# Patient Record
Sex: MALE | Race: WHITE | NOT HISPANIC OR LATINO | Employment: FULL TIME | URBAN - METROPOLITAN AREA
[De-identification: names, ages, dates, MRNs, and addresses within clinical notes are randomized per-mention and may not be internally consistent; named-entity substitution may affect disease eponyms.]

---

## 2017-02-25 ENCOUNTER — APPOINTMENT (OUTPATIENT)
Dept: ULTRASOUND IMAGING | Facility: CLINIC | Age: 42
End: 2017-02-25
Attending: EMERGENCY MEDICINE
Payer: COMMERCIAL

## 2017-02-25 ENCOUNTER — HOSPITAL ENCOUNTER (EMERGENCY)
Facility: CLINIC | Age: 42
Discharge: HOME | End: 2017-02-25
Attending: EMERGENCY MEDICINE | Admitting: EMERGENCY MEDICINE
Payer: COMMERCIAL

## 2017-02-25 VITALS
OXYGEN SATURATION: 98 % | DIASTOLIC BLOOD PRESSURE: 85 MMHG | HEIGHT: 71 IN | TEMPERATURE: 98.7 F | SYSTOLIC BLOOD PRESSURE: 140 MMHG | HEART RATE: 66 BPM | BODY MASS INDEX: 28 KG/M2 | RESPIRATION RATE: 16 BRPM | WEIGHT: 200 LBS

## 2017-02-25 DIAGNOSIS — R10.13 ABDOMINAL PAIN, EPIGASTRIC: ICD-10-CM

## 2017-02-25 DIAGNOSIS — R11.2 NAUSEA AND VOMITING, INTRACTABILITY OF VOMITING NOT SPECIFIED, UNSPECIFIED VOMITING TYPE: ICD-10-CM

## 2017-02-25 LAB
ALBUMIN SERPL-MCNC: 3.1 G/DL (ref 3.4–5)
ALP SERPL-CCNC: 54 U/L (ref 40–150)
ALT SERPL W P-5'-P-CCNC: 15 U/L (ref 0–70)
ANION GAP SERPL CALCULATED.3IONS-SCNC: 10 MMOL/L (ref 3–14)
AST SERPL W P-5'-P-CCNC: 11 U/L (ref 0–45)
BASOPHILS # BLD AUTO: 0 10E9/L (ref 0–0.2)
BASOPHILS NFR BLD AUTO: 0.2 %
BILIRUB SERPL-MCNC: 0.7 MG/DL (ref 0.2–1.3)
BUN SERPL-MCNC: 11 MG/DL (ref 7–30)
CALCIUM SERPL-MCNC: 8.3 MG/DL (ref 8.5–10.1)
CHLORIDE SERPL-SCNC: 95 MMOL/L (ref 94–109)
CO2 SERPL-SCNC: 27 MMOL/L (ref 20–32)
CREAT SERPL-MCNC: 0.78 MG/DL (ref 0.66–1.25)
DIFFERENTIAL METHOD BLD: NORMAL
EOSINOPHIL # BLD AUTO: 0 10E9/L (ref 0–0.7)
EOSINOPHIL NFR BLD AUTO: 0.5 %
ERYTHROCYTE [DISTWIDTH] IN BLOOD BY AUTOMATED COUNT: 12.8 % (ref 10–15)
GFR SERPL CREATININE-BSD FRML MDRD: ABNORMAL ML/MIN/1.7M2
GLUCOSE SERPL-MCNC: 106 MG/DL (ref 70–99)
HCT VFR BLD AUTO: 46.3 % (ref 40–53)
HGB BLD-MCNC: 16.8 G/DL (ref 13.3–17.7)
IMM GRANULOCYTES # BLD: 0 10E9/L (ref 0–0.4)
IMM GRANULOCYTES NFR BLD: 0.3 %
LIPASE SERPL-CCNC: 115 U/L (ref 73–393)
LYMPHOCYTES # BLD AUTO: 1.2 10E9/L (ref 0.8–5.3)
LYMPHOCYTES NFR BLD AUTO: 13.1 %
MCH RBC QN AUTO: 31.8 PG (ref 26.5–33)
MCHC RBC AUTO-ENTMCNC: 36.3 G/DL (ref 31.5–36.5)
MCV RBC AUTO: 88 FL (ref 78–100)
MONOCYTES # BLD AUTO: 1.1 10E9/L (ref 0–1.3)
MONOCYTES NFR BLD AUTO: 12.1 %
NEUTROPHILS # BLD AUTO: 6.5 10E9/L (ref 1.6–8.3)
NEUTROPHILS NFR BLD AUTO: 73.8 %
NRBC # BLD AUTO: 0 10*3/UL
NRBC BLD AUTO-RTO: 0 /100
PLATELET # BLD AUTO: 290 10E9/L (ref 150–450)
POTASSIUM SERPL-SCNC: 3.4 MMOL/L (ref 3.4–5.3)
PROT SERPL-MCNC: 6.8 G/DL (ref 6.8–8.8)
RBC # BLD AUTO: 5.29 10E12/L (ref 4.4–5.9)
SODIUM SERPL-SCNC: 132 MMOL/L (ref 133–144)
WBC # BLD AUTO: 8.8 10E9/L (ref 4–11)

## 2017-02-25 PROCEDURE — 85025 COMPLETE CBC W/AUTO DIFF WBC: CPT | Performed by: EMERGENCY MEDICINE

## 2017-02-25 PROCEDURE — 83690 ASSAY OF LIPASE: CPT | Performed by: EMERGENCY MEDICINE

## 2017-02-25 PROCEDURE — 80053 COMPREHEN METABOLIC PANEL: CPT | Performed by: EMERGENCY MEDICINE

## 2017-02-25 PROCEDURE — 76705 ECHO EXAM OF ABDOMEN: CPT

## 2017-02-25 PROCEDURE — 25000128 H RX IP 250 OP 636: Performed by: EMERGENCY MEDICINE

## 2017-02-25 PROCEDURE — 96374 THER/PROPH/DIAG INJ IV PUSH: CPT

## 2017-02-25 PROCEDURE — 96361 HYDRATE IV INFUSION ADD-ON: CPT

## 2017-02-25 PROCEDURE — 99285 EMERGENCY DEPT VISIT HI MDM: CPT | Mod: 25

## 2017-02-25 RX ORDER — ONDANSETRON 2 MG/ML
4 INJECTION INTRAMUSCULAR; INTRAVENOUS
Status: COMPLETED | OUTPATIENT
Start: 2017-02-25 | End: 2017-02-25

## 2017-02-25 RX ORDER — ONDANSETRON 4 MG/1
4 TABLET, ORALLY DISINTEGRATING ORAL EVERY 8 HOURS PRN
Qty: 10 TABLET | Refills: 0 | Status: SHIPPED | OUTPATIENT
Start: 2017-02-25 | End: 2017-02-28

## 2017-02-25 RX ORDER — SODIUM CHLORIDE 9 MG/ML
1000 INJECTION, SOLUTION INTRAVENOUS CONTINUOUS
Status: DISCONTINUED | OUTPATIENT
Start: 2017-02-25 | End: 2017-02-25 | Stop reason: HOSPADM

## 2017-02-25 RX ADMIN — SODIUM CHLORIDE 1000 ML: 9 INJECTION, SOLUTION INTRAVENOUS at 06:54

## 2017-02-25 RX ADMIN — ONDANSETRON HYDROCHLORIDE 4 MG: 2 SOLUTION INTRAMUSCULAR; INTRAVENOUS at 06:04

## 2017-02-25 RX ADMIN — SODIUM CHLORIDE 1000 ML: 9 INJECTION, SOLUTION INTRAVENOUS at 06:04

## 2017-02-25 ASSESSMENT — ENCOUNTER SYMPTOMS
NAUSEA: 1
SORE THROAT: 1
DIARRHEA: 0
COUGH: 0
CHILLS: 1
ABDOMINAL PAIN: 1
SHORTNESS OF BREATH: 0
VOMITING: 1
FEVER: 1
MYALGIAS: 1

## 2017-02-25 NOTE — ED AVS SNAPSHOT
Emergency Department    [address] [facility] [address]    Phone:  [phone]    Fax:  [phone]                                       Simone Johnson   MRN: [id number]    Department:   Emergency Department   Date of Visit:  2/25/2017           Patient Information     Date Of Birth          1975        Your diagnoses for this visit were:     Abdominal pain, epigastric     Nausea and vomiting, intractability of vomiting not specified, unspecified vomiting type        You were seen by Garrett Oconnor MD.      Follow-up Information     Follow up with [facility]. Schedule an appointment as soon as possible for a visit in 2 days.    Specialties:  Podiatry, Internal Medicine, Family Medicine    Contact information:    [address] [address] [phone]        Follow up with  Emergency Department.    Specialty:  EMERGENCY MEDICINE    Why:  If symptoms worsen    Contact information:    [address] [facility] [id number] [phone]        Discharge Instructions       Follow-up:  Please follow-up with your primary care provider in 2-3 days for re-evaluation and discussion of your visit to the emergency department today.    Home treatments:  Recommended home therapies include rest, fluids as able, zofran for nausea, bland diet, and close monitoring of symptoms.    New prescriptions:  Zofran    Return precautions:  Warning signs which should prompt you to return to the ER include worsening nausea, vomiting, inability to keep down fluids or foods, severe pain, or any other new or troubling symptoms.  We are always happy to see you again.    Discharge Instructions  Abdominal Pain    Abdominal pain can be caused by many things. Your evaluation today does not show the exact cause for your pain. Your doctor today has decided that it is unlikely your pain is due to a life threatening problem, or a problem requiring  surgery or hospital admission. Sometimes those problems cannot be found right away, so it is very important that you follow up as directed.  Sometimes only the changes which occur over time allow the cause of your pain to be found.    Return to the Emergency Department for a recheck in 8-12 hours if your pain continues.  If your pain gets worse, changes in location, or feels different, return to the Emergency Department right away.    ADULTS:  Return to the Emergency Department right away if:      You get an oral temperature above 102oF or as directed by your doctor.    You have blood in your stools (bright red or black, tarry stools).    You keep throwing up or can t drink liquids.    You see blood when you throw up.    You can t have a bowel movement or you can t pass gas.    Your stomach gets bloated or bigger.    Your skin or the whites of your eyes look yellow.    You faint.    You have bloody, frequent or painful urination.    You have new symptoms or anything that worries you.    CHILDREN:  Return to the Emergency Department right away if your child has any of the above-listed symptoms or the following:      Pushes your hand away or screams/cries when his/her belly is touched.    You notice your child is very fussy or weak.    Your child is very tired and is too tired to eat or drink.    Your child is dehydrated.  Signs of dehydration can be:  o Your infant has had no wet diapers in 4-5 hours.  o Your older child has not passed urine in 6-8 hours.  o Your infant or child starts to have dry mouth and lips, or no saliva or tears.    PREGNANT WOMEN:  Return to the Emergency Department right away if you have any of the above-listed symptoms or the following:      You have bleeding, leaking fluid or passing tissue from the vagina.    You have worse pain or cramping, or pain in your shoulder or back.    You have vomiting that will not stop.    You have painful or bloody urination.    You have a temperature of 100oF  or more.    Your baby is not moving as much as usual.    You faint.    You get a bad headache with or without eye problems and abdominal pain.    You have a convulsion or seizure.    You have unusual discharge from your vagina and abdominal pain.    Abdominal pain is pretty common during pregnancy.  Your pain may or may not be related to your pregnancy. You should follow-up closely with your OB doctor so they can evaluate you and your baby.  Until you follow-up with your regular doctor, do the following:       Avoid sex and do not put anything in your vagina.    Drink clear fluids.    Only take medications approved by your doctor.    MORE INFORMATION:    Appendicitis:  A possible cause of abdominal pain in any person who still has their appendix is acute appendicitis. Appendicitis is often hard to diagnose.  Testing does not always rule out early appendicitis or other causes of abdominal pain. Close follow-up with your doctor and re-evaluations may be needed to figure out the reason for your abdominal pain.    Follow-up:  It is very important that you make an appointment with your clinic and go to the appointment.  If you do not follow-up with your primary doctor, it may result in missing an important development which could result in permanent injury or disability and/or lasting pain.  If there is any problem keeping your appointment, call your doctor or return to the Emergency Department.    Medications:  Take your medications as directed by your doctor today.  Before using over-the-counter medications, ask your doctor and make sure to take the medications as directed.  If you have any questions about medications, ask your doctor.    Diet:  Resume your normal diet as much as possible, but do not eat fried, fatty or spicy foods while you have pain.  Do not drink alcohol or have caffeine.  Do not smoke tobacco.    Probiotics: If you have been given an antibiotic, you may want to also take a probiotic pill or eat  "yogurt with live cultures. Probiotics have \"good bacteria\" to help your intestines stay healthy. Studies have shown that probiotics help prevent diarrhea and other intestine problems (including C. diff infection) when you take antibiotics. You can buy these without a prescription in the pharmacy section of the store.     If you were given a prescription for medicine here today, be sure to read all of the information (including the package insert) that comes with your prescription.  This will include important information about the medicine, its side effects, and any warnings that you need to know about.  The pharmacist who fills the prescription can provide more information and answer questions you may have about the medicine.  If you have questions or concerns that the pharmacist cannot address, please call or return to the Emergency Department.         Opioid Medication Information    Pain medications are among the most commonly prescribed medicines, so we are including this information for all our patients. If you did not receive pain medication or get a prescription for pain medicine, you can ignore it.     You may have been given a prescription for an opioid (narcotic) pain medicine and/or have received a pain medicine while here in the Emergency Department. These medicines can make you drowsy or impaired. You must not drive, operate dangerous equipment, or engage in any other dangerous activities while taking these medications. If you drive while taking these medications, you could be arrested for DUI, or driving under the influence. Do not drink any alcohol while you are taking these medications.     Opioid pain medications can cause addiction. If you have a history of chemical dependency of any type, you are at a higher risk of becoming addicted to pain medications.  Only take these prescribed medications to treat your pain when all other options have been tried. Take it for as short a time and as few doses " as possible. Store your pain pills in a secure place, as they are frequently stolen and provide a dangerous opportunity for children or visitors in your house to start abusing these powerful medications. We will not replace any lost or stolen medicine.  As soon as your pain is better, you should flush all your remaining medication.     Many prescription pain medications contain Tylenol  (acetaminophen), including Vicodin , Tylenol #3 , Norco , Lortab , and Percocet .  You should not take any extra pills of Tylenol  if you are using these prescription medications or you can get very sick.  Do not ever take more than 3000 mg of acetaminophen in any 24 hour period.    All opioids tend to cause constipation. Drink plenty of water and eat foods that have a lot of fiber, such as fruits, vegetables, prune juice, apple juice and high fiber cereal.  Take a laxative if you don t move your bowels at least every other day. Miralax , Milk of Magnesia, Colace , or Senna  can be used to keep you regular.      Remember that you can always come back to the Emergency Department if you are not able to see your regular doctor in the amount of time listed above, if you get any new symptoms, or if there is anything that worries you.              24 Hour Appointment Hotline       To make an appointment at any [facility], call [phone] ([phone]). If you don't have a family doctor or clinic, we will help you find one. [facility] are conveniently located to serve the needs of you and your family.             Review of your medicines      START taking        Dose / Directions Last dose taken    ondansetron 4 MG ODT tab   Commonly known as:  ZOFRAN ODT   Dose:  4 mg   Quantity:  10 tablet        Take 1 tablet (4 mg) by mouth every 8 hours as needed for nausea   Refills:  0        ranitidine 150 MG tablet   Commonly known as:  ZANTAC   Dose:  150 mg   Quantity:  20 tablet        Take 1 tablet (150 mg) by mouth 2 times  daily for 10 days   Refills:  0                Prescriptions were sent or printed at these locations (2 Prescriptions)                   Other Prescriptions                Printed at Department/Unit printer (2 of 2)         ondansetron (ZOFRAN ODT) 4 MG ODT tab               ranitidine (ZANTAC) 150 MG tablet                Procedures and tests performed during your visit     CBC with platelets differential    Comprehensive metabolic panel    Lipase    US Abdomen Limited      Orders Needing Specimen Collection     None      Pending Results     Date and Time Order Name Status Description    2/25/2017 0600 US Abdomen Limited Preliminary             Pending Culture Results     No orders found from 2/23/2017 to 2/26/2017.             Test Results from your hospital stay     2/25/2017  6:08 AM - Interface, Flexilab Results      Component Results     Component Value Ref Range & Units Status    WBC 8.8 4.0 - 11.0 10e9/L Final    RBC Count 5.29 4.4 - 5.9 10e12/L Final    Hemoglobin 16.8 13.3 - 17.7 g/dL Final    Hematocrit 46.3 40.0 - 53.0 % Final    MCV 88 78 - 100 fl Final    MCH 31.8 26.5 - 33.0 pg Final    MCHC 36.3 31.5 - 36.5 g/dL Final    RDW 12.8 10.0 - 15.0 % Final    Platelet Count 290 150 - 450 10e9/L Final    Diff Method Automated Method  Final    % Neutrophils 73.8 % Final    % Lymphocytes 13.1 % Final    % Monocytes 12.1 % Final    % Eosinophils 0.5 % Final    % Basophils 0.2 % Final    % Immature Granulocytes 0.3 % Final    Nucleated RBCs 0 0 /100 Final    Absolute Neutrophil 6.5 1.6 - 8.3 10e9/L Final    Absolute Lymphocytes 1.2 0.8 - 5.3 10e9/L Final    Absolute Monocytes 1.1 0.0 - 1.3 10e9/L Final    Absolute Eosinophils 0.0 0.0 - 0.7 10e9/L Final    Absolute Basophils 0.0 0.0 - 0.2 10e9/L Final    Abs Immature Granulocytes 0.0 0 - 0.4 10e9/L Final    Absolute Nucleated RBC 0.0  Final         2/25/2017  6:27 AM - Interface, Flexilab Results      Component Results     Component Value Ref Range & Units Status     Sodium 132 (L) 133 - 144 mmol/L Final    Potassium 3.4 3.4 - 5.3 mmol/L Final    Chloride 95 94 - 109 mmol/L Final    Carbon Dioxide 27 20 - 32 mmol/L Final    Anion Gap 10 3 - 14 mmol/L Final    Glucose 106 (H) 70 - 99 mg/dL Final    Urea Nitrogen 11 7 - 30 mg/dL Final    Creatinine 0.78 0.66 - 1.25 mg/dL Final    GFR Estimate >90  Non  GFR Calc   >60 mL/min/1.7m2 Final    GFR Estimate If Black >90   GFR Calc   >60 mL/min/1.7m2 Final    Calcium 8.3 (L) 8.5 - 10.1 mg/dL Final    Bilirubin Total 0.7 0.2 - 1.3 mg/dL Final    Albumin 3.1 (L) 3.4 - 5.0 g/dL Final    Protein Total 6.8 6.8 - 8.8 g/dL Final    Alkaline Phosphatase 54 40 - 150 U/L Final    ALT 15 0 - 70 U/L Final    AST 11 0 - 45 U/L Final         2/25/2017  6:26 AM - Interface, Flexilab Results      Component Results     Component Value Ref Range & Units Status    Lipase 115 73 - 393 U/L Final         2/25/2017  7:14 AM - Interface, Radiant Ib      Narrative     ULTRASOUND ABDOMEN LIMITED  2/25/2017 6:54 AM     HISTORY:  Right upper quadrant pain.    COMPARISON: None.    FINDINGS: Gallbladder is normal. No stones. No dilated bile ducts. No  sonographic Luz sign.    Liver is normal in size and echotexture.    Pancreas not seen due to bowel gas.    Right kidney measures 14.6 cm ecia-xv-xmuc and appears normal. No mass  or obstruction.        Impression     IMPRESSION: Normal right upper quadrant ultrasound.                Clinical Quality Measure: Blood Pressure Screening     Your blood pressure was checked while you were in the emergency department today. The last reading we obtained was  BP: 131/78 . Please read the guidelines below about what these numbers mean and what you should do about them.  If your systolic blood pressure (the top number) is less than 120 and your diastolic blood pressure (the bottom number) is less than 80, then your blood pressure is normal. There is nothing more that you need to do about  "it.  If your systolic blood pressure (the top number) is 120-139 or your diastolic blood pressure (the bottom number) is 80-89, your blood pressure may be higher than it should be. You should have your blood pressure rechecked within a year by a primary care provider.  If your systolic blood pressure (the top number) is 140 or greater or your diastolic blood pressure (the bottom number) is 90 or greater, you may have high blood pressure. High blood pressure is treatable, but if left untreated over time it can put you at risk for heart attack, stroke, or kidney failure. You should have your blood pressure rechecked by a primary care provider within the next 4 weeks.  If your provider in the emergency department today gave you specific instructions to follow-up with your doctor or provider even sooner than that, you should follow that instruction and not wait for up to 4 weeks for your follow-up visit.        Thank you for choosing [facility]       Thank you for choosing [facility] for your care. Our goal is always to provide you with excellent care. Hearing back from our patients is one way we can continue to improve our services. Please take a few minutes to complete the written survey that you may receive in the mail after you visit with us. Thank you!        [employer] Information     [email or web address] lets you send messages to your doctor, view your test results, renew your prescriptions, schedule appointments and more. To sign up, go to [email or web address] . Click on \"Log in\" on the left side of the screen, which will take you to the Welcome page. Then click on \"Sign up Now\" on the right side of the page.     You will be asked to enter the access code listed below, as well as some personal information. Please follow the directions to create your username and password.     Your access code is: [email or web address]  Expires: 2017  7:54 AM     Your access code will  in 90 days. If you need help or a new code, please call " your [facility] clinic or [phone].        Care EveryWhere ID     This is your Care EveryWhere ID. This could be used by other organizations to access your [facility] medical records  [id number]        After Visit Summary       This is your record. Keep this with you and show to your community pharmacist(s) and doctor(s) at your next visit.

## 2017-02-25 NOTE — ED AVS SNAPSHOT
Emergency Department    [address] [place] [address]    Phone:  [phone]    Fax:  [phone]                                       Simone Johnson   MRN: [id number]    Department:   Emergency Department   Date of Visit:  2/25/2017           After Visit Summary Signature Page     I have received my discharge instructions, and my questions have been answered. I have discussed any challenges I see with this plan with the nurse or doctor.    ..........................................................................................................................................  Patient/Patient Representative Signature      ..........................................................................................................................................  Patient Representative Print Name and Relationship to Patient    ..................................................               ................................................  Date                                            Time    ..........................................................................................................................................  Reviewed by Signature/Title    ...................................................              ..............................................  Date                                                            Time

## 2017-02-25 NOTE — ED PROVIDER NOTES
"  History     Chief Complaint:  Vomiting    HPI   Simone Johnson is a 42 year old male with a history of IBS who presents with vomiting. For the past 5 days, the patient has been experiencing nausea, vomiting, epigastric abdominal pain, fevers, chills, myalgias and a sore throat. He states that he has been unable to keep any food or fluids down since the time of symptom onset. The patient did notice a small amount of blood in his vomit 3-4 days ago, though this has since resolved. The patient did travel to the [facility] in early February though denies any other travel or symptoms around that time. No chest pain or shortness of breath. No cough. No diarrhea. He has not eaten any undercooked food. The patient has not been around anyone else that is sick. The patient has been taking tylenol and ibuprofen for his symptoms.    Allergies:  No known drug allergies.    Medications:    The patient is currently on no regular medications.    Past Medical History:    IBS    Past Surgical History:    Hernia repair    Family History:    History reviewed. No pertinent family history.    Social History:  Marital Status:   Presents to the ED alone  Tobacco Use: negative  Alcohol Use: positive  PCP: [facility]    Review of Systems   Constitutional: Positive for chills and fever.   HENT: Positive for sore throat.    Respiratory: Negative for cough and shortness of breath.    Cardiovascular: Negative for chest pain.   Gastrointestinal: Positive for abdominal pain, nausea and vomiting. Negative for diarrhea.   Musculoskeletal: Positive for myalgias.   All other systems reviewed and are negative.    Physical Exam   First Vitals:  BP: (!) 141/92  Pulse: 90  Temp: 98.6  F (37  C)  Resp: 18  Height: 180.3 cm (5' 11\")  Weight: 90.7 kg (200 lb)  SpO2: 97 %    Physical Exam  General:  Well-nourished  Speaking in full sentences  Resting comfortably on gurney  No active retching  Eyes:  Conjunctiva without injection " or scleral icterus  PERRL  ENT:  Moist mucous membranes  Posterior oropharynx clear without erythema or exudate  Nares patent  Pinnae normal  Neck:  Full ROM  No stiffness appreciated  Resp:  Lungs CTAB  No crackles, wheezing or audible rubs  Good air movement  CV:   Normal rate, regular rhythm  S1 and S2 present  No murmur, gallop or rub  GI:  BS present  Abdomen soft without distention  Tenderness to palpation in epigastric region  Remainder of abdomen is soft without focal tenderness  No guarding or rebound tenderness  Skin:  Warm, dry, well perfused  No rashes or open wounds on exposed skin  MSK:  Moves all extremities  No focal deformities or swelling  Neuro:  Alert  Answers questions appropriately  Moves all extremities equally  Gait stable  Psych:  Normal affect, normal mood    Emergency Department Course   Imaging:  Radiographic findings were communicated with the patient who voiced understanding of the findings.    US Abdomen Limited  Normal right upper quadrant ultrasound.  Preliminary radiology read.      Laboratory:  CBC:  WBC 8.8, HGB 16.8, , otherwise WNL  CMP:  (L), glucose 106 (H), calcium 8.3 (L), albumin 3.1 (L), otherwise WNL (Creatinine 0.78)  Lipase: 115    Interventions:  (0654) Normal Saline, 1 liter, IV bolus  (0755) Normal Saline, 1 liter, IV bolus  (0604) Zofran, 4 mg, IV injection    Emergency Department Course:  Nursing notes and vitals reviewed.  I performed an exam of the patient as documented above.  A peripheral IV was established. Blood was drawn from the patient. This was sent for laboratory testing, findings above.   The patient was sent for a abdomen ultrasound while in the emergency department, findings above.   (0750) I rechecked the patient and updated him on his results.  Findings and plan explained to the patient. Patient discharged home with instructions regarding supportive care, medications, and reasons to return. The importance of close follow-up was  reviewed. The patient was prescribed Zantac and Zofran.    Impression & Plan    Medical Decision Making:  Simone Johnson is a 42 year old male presenting to the emergency department for evaluation of vomiting. Vital signs on presentation reveal elevated blood pressure, though otherwise are unremarkable and improved during his emergency department course. History, exam, and ED course as above. Differential diagnosis includes gastritis, peptic ulcer disease, pancreatic, biliary colic, cholecystitis, choledocholithiasis, colitis, bowel obstruction, perforation, acute appendicitis, mechanical obstruction, among others, at this point, the exact cause of the patient s symptoms are not entirely clear. Laboratory studies are notable for NA of 132 which may be related to underlying dehydration. CBC otherwise unremarkable. LFT s are normal. Lipase normal arguing against pancreatitis. Patient provided Zofran noting improvements in symptoms. Right upper quadrant ultrasound is negative for evidence of gallstones nor cholecystitis. Bile duct is non dilated. Given patient s improvement in symptoms, well appearance, reassuring abdominal exam, and ability to tolerate PO, I feel that patient is stable and safe or discharge home with close outpatient follow up. Will begin Zofran as well as zantac for symptom control. Follow up with PCP in 2-3 days. Outpatient referral information provided. Return to ER with worsening vomiting, inability to tolerate PO, worsening pain, or any other new or worsening symptoms. all questions answered prior to discharge.    Diagnosis:    ICD-10-CM    1. Abdominal pain, epigastric R10.13 CBC with platelets differential     Comprehensive metabolic panel     Lipase   2. Nausea and vomiting, intractability of vomiting not specified, unspecified vomiting type R11.2        Disposition:  Discharge to home.    Discharge Medications:  Discharge Medication List as of 2/25/2017  8:04 AM      START taking these  medications    Details   ondansetron (ZOFRAN ODT) 4 MG ODT tab Take 1 tablet (4 mg) by mouth every 8 hours as needed for nausea, Disp-10 tablet, R-0, Local Print      ranitidine (ZANTAC) 150 MG tablet Take 1 tablet (150 mg) by mouth 2 times daily for 10 days, Disp-20 tablet, R-0, Local Print             Jair GUY, cinthya serving as a scribe on 2/25/2017 at 5:51 AM to personally document services performed by Dr. Oconnor based on my observations and the provider's statements to me.        Garrett Oconnor MD  02/25/17 0945

## 2017-02-25 NOTE — DISCHARGE INSTRUCTIONS
Follow-up:  Please follow-up with your primary care provider in 2-3 days for re-evaluation and discussion of your visit to the emergency department today.    Home treatments:  Recommended home therapies include rest, fluids as able, zofran for nausea, bland diet, and close monitoring of symptoms.    New prescriptions:  Zofran    Return precautions:  Warning signs which should prompt you to return to the ER include worsening nausea, vomiting, inability to keep down fluids or foods, severe pain, or any other new or troubling symptoms.  We are always happy to see you again.    Discharge Instructions  Abdominal Pain    Abdominal pain can be caused by many things. Your evaluation today does not show the exact cause for your pain. Your doctor today has decided that it is unlikely your pain is due to a life threatening problem, or a problem requiring surgery or hospital admission. Sometimes those problems cannot be found right away, so it is very important that you follow up as directed.  Sometimes only the changes which occur over time allow the cause of your pain to be found.    Return to the Emergency Department for a recheck in 8-12 hours if your pain continues.  If your pain gets worse, changes in location, or feels different, return to the Emergency Department right away.    ADULTS:  Return to the Emergency Department right away if:      You get an oral temperature above 102oF or as directed by your doctor.    You have blood in your stools (bright red or black, tarry stools).    You keep throwing up or can t drink liquids.    You see blood when you throw up.    You can t have a bowel movement or you can t pass gas.    Your stomach gets bloated or bigger.    Your skin or the whites of your eyes look yellow.    You faint.    You have bloody, frequent or painful urination.    You have new symptoms or anything that worries you.    CHILDREN:  Return to the Emergency Department right away if your child has any of the  above-listed symptoms or the following:      Pushes your hand away or screams/cries when his/her belly is touched.    You notice your child is very fussy or weak.    Your child is very tired and is too tired to eat or drink.    Your child is dehydrated.  Signs of dehydration can be:  o Your infant has had no wet diapers in 4-5 hours.  o Your older child has not passed urine in 6-8 hours.  o Your infant or child starts to have dry mouth and lips, or no saliva or tears.    PREGNANT WOMEN:  Return to the Emergency Department right away if you have any of the above-listed symptoms or the following:      You have bleeding, leaking fluid or passing tissue from the vagina.    You have worse pain or cramping, or pain in your shoulder or back.    You have vomiting that will not stop.    You have painful or bloody urination.    You have a temperature of 100oF or more.    Your baby is not moving as much as usual.    You faint.    You get a bad headache with or without eye problems and abdominal pain.    You have a convulsion or seizure.    You have unusual discharge from your vagina and abdominal pain.    Abdominal pain is pretty common during pregnancy.  Your pain may or may not be related to your pregnancy. You should follow-up closely with your OB doctor so they can evaluate you and your baby.  Until you follow-up with your regular doctor, do the following:       Avoid sex and do not put anything in your vagina.    Drink clear fluids.    Only take medications approved by your doctor.    MORE INFORMATION:    Appendicitis:  A possible cause of abdominal pain in any person who still has their appendix is acute appendicitis. Appendicitis is often hard to diagnose.  Testing does not always rule out early appendicitis or other causes of abdominal pain. Close follow-up with your doctor and re-evaluations may be needed to figure out the reason for your abdominal pain.    Follow-up:  It is very important that you make an appointment  "with your clinic and go to the appointment.  If you do not follow-up with your primary doctor, it may result in missing an important development which could result in permanent injury or disability and/or lasting pain.  If there is any problem keeping your appointment, call your doctor or return to the Emergency Department.    Medications:  Take your medications as directed by your doctor today.  Before using over-the-counter medications, ask your doctor and make sure to take the medications as directed.  If you have any questions about medications, ask your doctor.    Diet:  Resume your normal diet as much as possible, but do not eat fried, fatty or spicy foods while you have pain.  Do not drink alcohol or have caffeine.  Do not smoke tobacco.    Probiotics: If you have been given an antibiotic, you may want to also take a probiotic pill or eat yogurt with live cultures. Probiotics have \"good bacteria\" to help your intestines stay healthy. Studies have shown that probiotics help prevent diarrhea and other intestine problems (including C. diff infection) when you take antibiotics. You can buy these without a prescription in the pharmacy section of the store.     If you were given a prescription for medicine here today, be sure to read all of the information (including the package insert) that comes with your prescription.  This will include important information about the medicine, its side effects, and any warnings that you need to know about.  The pharmacist who fills the prescription can provide more information and answer questions you may have about the medicine.  If you have questions or concerns that the pharmacist cannot address, please call or return to the Emergency Department.         Opioid Medication Information    Pain medications are among the most commonly prescribed medicines, so we are including this information for all our patients. If you did not receive pain medication or get a prescription for " pain medicine, you can ignore it.     You may have been given a prescription for an opioid (narcotic) pain medicine and/or have received a pain medicine while here in the Emergency Department. These medicines can make you drowsy or impaired. You must not drive, operate dangerous equipment, or engage in any other dangerous activities while taking these medications. If you drive while taking these medications, you could be arrested for DUI, or driving under the influence. Do not drink any alcohol while you are taking these medications.     Opioid pain medications can cause addiction. If you have a history of chemical dependency of any type, you are at a higher risk of becoming addicted to pain medications.  Only take these prescribed medications to treat your pain when all other options have been tried. Take it for as short a time and as few doses as possible. Store your pain pills in a secure place, as they are frequently stolen and provide a dangerous opportunity for children or visitors in your house to start abusing these powerful medications. We will not replace any lost or stolen medicine.  As soon as your pain is better, you should flush all your remaining medication.     Many prescription pain medications contain Tylenol  (acetaminophen), including Vicodin , Tylenol #3 , Norco , Lortab , and Percocet .  You should not take any extra pills of Tylenol  if you are using these prescription medications or you can get very sick.  Do not ever take more than 3000 mg of acetaminophen in any 24 hour period.    All opioids tend to cause constipation. Drink plenty of water and eat foods that have a lot of fiber, such as fruits, vegetables, prune juice, apple juice and high fiber cereal.  Take a laxative if you don t move your bowels at least every other day. Miralax , Milk of Magnesia, Colace , or Senna  can be used to keep you regular.      Remember that you can always come back to the Emergency Department if you are not  able to see your regular doctor in the amount of time listed above, if you get any new symptoms, or if there is anything that worries you.

## 2020-12-16 ENCOUNTER — HOSPITAL ENCOUNTER (INPATIENT)
Facility: CLINIC | Age: 45
LOS: 4 days | Discharge: HOME | End: 2020-12-20
Attending: NURSE PRACTITIONER | Admitting: STUDENT IN AN ORGANIZED HEALTH CARE EDUCATION/TRAINING PROGRAM
Payer: COMMERCIAL

## 2020-12-16 ENCOUNTER — APPOINTMENT (OUTPATIENT)
Dept: CT IMAGING | Facility: CLINIC | Age: 45
End: 2020-12-16
Attending: NURSE PRACTITIONER
Payer: COMMERCIAL

## 2020-12-16 DIAGNOSIS — K26.4 GASTROINTESTINAL HEMORRHAGE ASSOCIATED WITH DUODENAL ULCER: Primary | ICD-10-CM

## 2020-12-16 DIAGNOSIS — K92.1 MELENA: ICD-10-CM

## 2020-12-16 DIAGNOSIS — R91.8 PULMONARY NODULES: ICD-10-CM

## 2020-12-16 DIAGNOSIS — K92.2 GI BLEED: ICD-10-CM

## 2020-12-16 PROBLEM — K92.0 HEMATEMESIS: Status: ACTIVE | Noted: 2020-12-16

## 2020-12-16 LAB
ABO + RH BLD: NORMAL
ABO + RH BLD: NORMAL
ALBUMIN SERPL-MCNC: 3.1 G/DL (ref 3.4–5)
ALP SERPL-CCNC: 70 U/L (ref 40–150)
ALT SERPL W P-5'-P-CCNC: 17 U/L (ref 0–70)
ANION GAP SERPL CALCULATED.3IONS-SCNC: 5 MMOL/L (ref 3–14)
AST SERPL W P-5'-P-CCNC: 8 U/L (ref 0–45)
BASOPHILS # BLD AUTO: 0.1 10E9/L (ref 0–0.2)
BASOPHILS NFR BLD AUTO: 0.4 %
BILIRUB SERPL-MCNC: 0.3 MG/DL (ref 0.2–1.3)
BLD GP AB SCN SERPL QL: NORMAL
BLOOD BANK CMNT PATIENT-IMP: NORMAL
BUN SERPL-MCNC: 17 MG/DL (ref 7–30)
CALCIUM SERPL-MCNC: 8.2 MG/DL (ref 8.5–10.1)
CHLORIDE SERPL-SCNC: 99 MMOL/L (ref 94–109)
CO2 SERPL-SCNC: 31 MMOL/L (ref 20–32)
CREAT SERPL-MCNC: 0.82 MG/DL (ref 0.66–1.25)
CRP SERPL-MCNC: 10.2 MG/L (ref 0–8)
DIFFERENTIAL METHOD BLD: ABNORMAL
EOSINOPHIL # BLD AUTO: 0 10E9/L (ref 0–0.7)
EOSINOPHIL NFR BLD AUTO: 0.1 %
ERYTHROCYTE [DISTWIDTH] IN BLOOD BY AUTOMATED COUNT: 12.4 % (ref 10–15)
ERYTHROCYTE [SEDIMENTATION RATE] IN BLOOD BY WESTERGREN METHOD: 36 MM/H (ref 0–15)
FLUAV+FLUBV RNA SPEC QL NAA+PROBE: NEGATIVE
FLUAV+FLUBV RNA SPEC QL NAA+PROBE: NEGATIVE
GFR SERPL CREATININE-BSD FRML MDRD: >90 ML/MIN/{1.73_M2}
GLUCOSE SERPL-MCNC: 99 MG/DL (ref 70–99)
HCT VFR BLD AUTO: 33.9 % (ref 40–53)
HEMOCCULT STL QL: POSITIVE
HGB BLD-MCNC: 11.5 G/DL (ref 13.3–17.7)
IMM GRANULOCYTES # BLD: 0 10E9/L (ref 0–0.4)
IMM GRANULOCYTES NFR BLD: 0.3 %
INTERPRETATION ECG - MUSE: NORMAL
LABORATORY COMMENT REPORT: NORMAL
LIPASE SERPL-CCNC: 145 U/L (ref 73–393)
LYMPHOCYTES # BLD AUTO: 0.9 10E9/L (ref 0.8–5.3)
LYMPHOCYTES NFR BLD AUTO: 6.8 %
MCH RBC QN AUTO: 31.3 PG (ref 26.5–33)
MCHC RBC AUTO-ENTMCNC: 33.9 G/DL (ref 31.5–36.5)
MCV RBC AUTO: 92 FL (ref 78–100)
MONOCYTES # BLD AUTO: 0.5 10E9/L (ref 0–1.3)
MONOCYTES NFR BLD AUTO: 3.5 %
NEUTROPHILS # BLD AUTO: 11.7 10E9/L (ref 1.6–8.3)
NEUTROPHILS NFR BLD AUTO: 88.9 %
NRBC # BLD AUTO: 0 10*3/UL
NRBC BLD AUTO-RTO: 0 /100
PLATELET # BLD AUTO: 485 10E9/L (ref 150–450)
POTASSIUM SERPL-SCNC: 3.8 MMOL/L (ref 3.4–5.3)
PROT SERPL-MCNC: 6.6 G/DL (ref 6.8–8.8)
RBC # BLD AUTO: 3.68 10E12/L (ref 4.4–5.9)
RSV RNA SPEC QL NAA+PROBE: NEGATIVE
SARS-COV-2 RNA SPEC QL NAA+PROBE: NEGATIVE
SODIUM SERPL-SCNC: 135 MMOL/L (ref 133–144)
SPECIMEN EXP DATE BLD: NORMAL
SPECIMEN SOURCE: NORMAL
TROPONIN I SERPL-MCNC: <0.015 UG/L (ref 0–0.04)
WBC # BLD AUTO: 13.2 10E9/L (ref 4–11)

## 2020-12-16 PROCEDURE — 250N000011 HC RX IP 250 OP 636: Performed by: STUDENT IN AN ORGANIZED HEALTH CARE EDUCATION/TRAINING PROGRAM

## 2020-12-16 PROCEDURE — 93005 ELECTROCARDIOGRAM TRACING: CPT

## 2020-12-16 PROCEDURE — 250N000009 HC RX 250: Performed by: NURSE PRACTITIONER

## 2020-12-16 PROCEDURE — 83690 ASSAY OF LIPASE: CPT | Performed by: NURSE PRACTITIONER

## 2020-12-16 PROCEDURE — 87636 SARSCOV2 & INF A&B AMP PRB: CPT | Performed by: NURSE PRACTITIONER

## 2020-12-16 PROCEDURE — 86850 RBC ANTIBODY SCREEN: CPT | Performed by: NURSE PRACTITIONER

## 2020-12-16 PROCEDURE — 96361 HYDRATE IV INFUSION ADD-ON: CPT

## 2020-12-16 PROCEDURE — 250N000011 HC RX IP 250 OP 636: Performed by: NURSE PRACTITIONER

## 2020-12-16 PROCEDURE — 86140 C-REACTIVE PROTEIN: CPT | Performed by: NURSE PRACTITIONER

## 2020-12-16 PROCEDURE — 85652 RBC SED RATE AUTOMATED: CPT | Performed by: NURSE PRACTITIONER

## 2020-12-16 PROCEDURE — C9803 HOPD COVID-19 SPEC COLLECT: HCPCS

## 2020-12-16 PROCEDURE — 96375 TX/PRO/DX INJ NEW DRUG ADDON: CPT

## 2020-12-16 PROCEDURE — C9113 INJ PANTOPRAZOLE SODIUM, VIA: HCPCS | Performed by: NURSE PRACTITIONER

## 2020-12-16 PROCEDURE — 120N000001 HC R&B MED SURG/OB

## 2020-12-16 PROCEDURE — 74177 CT ABD & PELVIS W/CONTRAST: CPT

## 2020-12-16 PROCEDURE — 86901 BLOOD TYPING SEROLOGIC RH(D): CPT | Performed by: NURSE PRACTITIONER

## 2020-12-16 PROCEDURE — 85025 COMPLETE CBC W/AUTO DIFF WBC: CPT | Performed by: NURSE PRACTITIONER

## 2020-12-16 PROCEDURE — 258N000003 HC RX IP 258 OP 636: Performed by: NURSE PRACTITIONER

## 2020-12-16 PROCEDURE — 86900 BLOOD TYPING SEROLOGIC ABO: CPT | Performed by: NURSE PRACTITIONER

## 2020-12-16 PROCEDURE — 96374 THER/PROPH/DIAG INJ IV PUSH: CPT | Mod: 59

## 2020-12-16 PROCEDURE — 80053 COMPREHEN METABOLIC PANEL: CPT | Performed by: NURSE PRACTITIONER

## 2020-12-16 PROCEDURE — 82272 OCCULT BLD FECES 1-3 TESTS: CPT | Performed by: NURSE PRACTITIONER

## 2020-12-16 PROCEDURE — C9113 INJ PANTOPRAZOLE SODIUM, VIA: HCPCS | Performed by: STUDENT IN AN ORGANIZED HEALTH CARE EDUCATION/TRAINING PROGRAM

## 2020-12-16 PROCEDURE — 84484 ASSAY OF TROPONIN QUANT: CPT | Performed by: NURSE PRACTITIONER

## 2020-12-16 PROCEDURE — 99285 EMERGENCY DEPT VISIT HI MDM: CPT | Mod: 25

## 2020-12-16 PROCEDURE — 99223 1ST HOSP IP/OBS HIGH 75: CPT | Mod: AI | Performed by: STUDENT IN AN ORGANIZED HEALTH CARE EDUCATION/TRAINING PROGRAM

## 2020-12-16 RX ORDER — IBUPROFEN 200 MG
400 TABLET ORAL EVERY 8 HOURS PRN
Status: ON HOLD | COMMUNITY
End: 2020-12-20

## 2020-12-16 RX ORDER — CEFTRIAXONE 1 G/1
1 INJECTION, POWDER, FOR SOLUTION INTRAMUSCULAR; INTRAVENOUS EVERY 24 HOURS
Status: DISCONTINUED | OUTPATIENT
Start: 2020-12-17 | End: 2020-12-20 | Stop reason: HOSPADM

## 2020-12-16 RX ORDER — ONDANSETRON 2 MG/ML
4 INJECTION INTRAMUSCULAR; INTRAVENOUS EVERY 6 HOURS PRN
Status: DISCONTINUED | OUTPATIENT
Start: 2020-12-16 | End: 2020-12-20 | Stop reason: HOSPADM

## 2020-12-16 RX ORDER — ONDANSETRON 2 MG/ML
4 INJECTION INTRAMUSCULAR; INTRAVENOUS
Status: DISCONTINUED | OUTPATIENT
Start: 2020-12-16 | End: 2020-12-16

## 2020-12-16 RX ORDER — ONDANSETRON 2 MG/ML
4 INJECTION INTRAMUSCULAR; INTRAVENOUS ONCE
Status: COMPLETED | OUTPATIENT
Start: 2020-12-16 | End: 2020-12-16

## 2020-12-16 RX ORDER — PROCHLORPERAZINE MALEATE 10 MG
10 TABLET ORAL EVERY 6 HOURS PRN
Status: DISCONTINUED | OUTPATIENT
Start: 2020-12-16 | End: 2020-12-20 | Stop reason: HOSPADM

## 2020-12-16 RX ORDER — ONDANSETRON 4 MG/1
4 TABLET, ORALLY DISINTEGRATING ORAL EVERY 6 HOURS PRN
Status: DISCONTINUED | OUTPATIENT
Start: 2020-12-16 | End: 2020-12-20 | Stop reason: HOSPADM

## 2020-12-16 RX ORDER — PROCHLORPERAZINE 25 MG
25 SUPPOSITORY, RECTAL RECTAL EVERY 12 HOURS PRN
Status: DISCONTINUED | OUTPATIENT
Start: 2020-12-16 | End: 2020-12-20 | Stop reason: HOSPADM

## 2020-12-16 RX ORDER — LIDOCAINE 40 MG/G
CREAM TOPICAL
Status: DISCONTINUED | OUTPATIENT
Start: 2020-12-16 | End: 2020-12-20 | Stop reason: HOSPADM

## 2020-12-16 RX ORDER — IOPAMIDOL 755 MG/ML
103 INJECTION, SOLUTION INTRAVASCULAR ONCE
Status: COMPLETED | OUTPATIENT
Start: 2020-12-16 | End: 2020-12-16

## 2020-12-16 RX ORDER — CEFTRIAXONE 1 G/1
1 INJECTION, POWDER, FOR SOLUTION INTRAMUSCULAR; INTRAVENOUS ONCE
Status: COMPLETED | OUTPATIENT
Start: 2020-12-16 | End: 2020-12-16

## 2020-12-16 RX ORDER — DEXTROSE, SODIUM CHLORIDE, SODIUM LACTATE, POTASSIUM CHLORIDE, AND CALCIUM CHLORIDE 5; .6; .31; .03; .02 G/100ML; G/100ML; G/100ML; G/100ML; G/100ML
INJECTION, SOLUTION INTRAVENOUS CONTINUOUS
Status: DISCONTINUED | OUTPATIENT
Start: 2020-12-16 | End: 2020-12-17

## 2020-12-16 RX ADMIN — IOPAMIDOL 103 ML: 755 INJECTION, SOLUTION INTRAVENOUS at 14:59

## 2020-12-16 RX ADMIN — CEFTRIAXONE SODIUM 1 G: 1 INJECTION, POWDER, FOR SOLUTION INTRAMUSCULAR; INTRAVENOUS at 18:20

## 2020-12-16 RX ADMIN — SODIUM CHLORIDE 1000 ML: 9 INJECTION, SOLUTION INTRAVENOUS at 13:52

## 2020-12-16 RX ADMIN — SODIUM CHLORIDE 69 ML: 9 INJECTION, SOLUTION INTRAVENOUS at 15:00

## 2020-12-16 RX ADMIN — ONDANSETRON 4 MG: 2 INJECTION INTRAMUSCULAR; INTRAVENOUS at 13:53

## 2020-12-16 RX ADMIN — PANTOPRAZOLE SODIUM 40 MG: 40 INJECTION, POWDER, FOR SOLUTION INTRAVENOUS at 13:53

## 2020-12-16 RX ADMIN — PANTOPRAZOLE SODIUM 40 MG: 40 INJECTION, POWDER, FOR SOLUTION INTRAVENOUS at 22:19

## 2020-12-16 RX ADMIN — SODIUM CHLORIDE, SODIUM LACTATE, POTASSIUM CHLORIDE, CALCIUM CHLORIDE AND DEXTROSE MONOHYDRATE: 5; 600; 310; 30; 20 INJECTION, SOLUTION INTRAVENOUS at 19:55

## 2020-12-16 ASSESSMENT — ENCOUNTER SYMPTOMS
SHORTNESS OF BREATH: 0
BACK PAIN: 0
DIZZINESS: 0
BLOOD IN STOOL: 1
DYSURIA: 0
FEVER: 0
HEADACHES: 0
WEAKNESS: 0
VOMITING: 1
ABDOMINAL PAIN: 1
NAUSEA: 1

## 2020-12-16 ASSESSMENT — ACTIVITIES OF DAILY LIVING (ADL): ADLS_ACUITY_SCORE: 17

## 2020-12-16 ASSESSMENT — MIFFLIN-ST. JEOR: SCORE: 1837

## 2020-12-16 NOTE — PROGRESS NOTES
Surgery:    46 yo male presenting with several day history of nausea/vomiting, more recently with dark vomit and dark stools.  Hemoglobin 11.5.  Evidence of leukocytosis.  Per report, abdominal exam fairly benign.  Abdominal CT demonstrates duodenitis with possible contained perforation.  No evidence of pneumoperitoneum.  Recommend admission to hospitalist with strict n.p.o., IV PPI, and IV antibiotics.  Formal general surgery consult to follow in a.m.      IMPRESSION:   1.  There is thickening of the second portion of the duodenum, with  mild associated inflammatory stranding, as well as an outpouching of  fluid through the duodenal wall in this region posteriorly.  Differential considerations include a contained perforation related to  a duodenal ulcer, duodenitis, or duodenal diverticulitis. Endoscopy  may be helpful for further evaluation.  2.  Few mildly prominent and enlarged lymph nodes in the right upper  quadrant are nonspecific, but may be reactive and related to the  duodenal inflammation.  3.  Indeterminate pulmonary nodule in the lingula measures 0.4 cm.    Kate Grover MD

## 2020-12-16 NOTE — ED TRIAGE NOTES
N/V for the past 8 days, starting today, feeling like he is going to pass out. Blood noted in vomit starting last night.

## 2020-12-16 NOTE — H&P
[facility]    History and Physical - Hospitalist Service       Date of Admission:  12/16/2020    Assessment & Plan   Simone Johnson is a 45 year old male admitted on 12/16/2020. He presents with dark stools and emesis. [facility] possible duodenal perforation.     Concern for duodenal ulcer with perforation  Hematemesis  Melena  Leukocytosis  Patient presents with 7 days of nausea vomiting that involved into dark stools and emesis day prior to arrival.  Hemoccult positive.  CT demonstrates a outpouching of the second portion of the duodenum concerning for ulcer with perforation.  Unclear etiology.  He did take 3 days of ibuprofen after a wisdom tooth removal 12 days prior to arrival  -N.p.o.  -Consult to surgery  -Consult to [facility]  -Ceftriaxone  -Every 6 hemoglobin for now, consent for blood signed in the ED    Normocytic, normochromic anemia, likely due to acute blood loss  -Trend hemoglobin as above and transfuse as needed    Reported gluten intolerance  Reported IBS  -Likely will need gluten-free diet once eating    Pulmonary nodule  CT demonstrated 4 mm pulmonary nodule in the lingula.  Patient is low risk.  No need for follow-up       Diet: NPO for Medical/Clinical Reasons Except for: No Exceptions    DVT Prophylaxis: Pneumatic Compression Devices  Yates Catheter: not present  Code Status:   Full Code         Disposition Plan   Expected discharge: TBD, recommended to TBD once hemoglobin stable and GI and surgery clearence.  Entered: Toni Lee MD 12/16/2020, 5:18 PM     The patient's care was discussed with the Bedside Nurse, Patient and ED team Team.    Toni Lee MD  [facility]  Contact information available via [facility] Paging/Directory      ______________________________________________________________________    Chief Complaint   Nausea and vomiting, dark stools, dark emesis    History is obtained from the patient    History of Present  Illness   Simone Johnson is a 45 year old male who has history of colorblindness, self diagnosed IBS and gluten intolerance.  He presents with approximately 1 week of nausea and vomiting.  He reports that in the last day or so he has developed several episodes of dark emesis and dark diarrhea.  He is unable to assess the color fully due to colorblindness.  He has been relatively asymptomatic other than during episodes of vomiting which he describes as extremely painful in his epigastrium.    Of note, he recently had a wisdom tooth removed 12 days prior to arrival and took 1600 mg daily of ibuprofen for 3 days.  He otherwise takes no daily medications, OTCs, or supplements.  Does not drink, use illicit drugs, or smoke.    In the ED he was under the care of Angely Torres CNP.  Case was reviewed with her.  CT demonstrating duodenal outpouching concerning for perforation.  Labs notable for leukocytosis with left shift, mild normocytic normochromic anemia, elevated CRP.  ED team discussed with gastroenterology and general surgery.  They recommendations are as outlined in plan above.    Review of Systems    The 10 point Review of Systems is negative other than noted in the HPI or here.     Past Medical History    I have reviewed this patient's medical history and updated it with pertinent information if needed.   History reviewed. No pertinent past medical history.  Otherwise healthy male with history of colorblindness.    Past Surgical History   I have reviewed this patient's surgical history and updated it with pertinent information if needed.  Past Surgical History:   Procedure Laterality Date     HERNIA REPAIR         Social History   I have reviewed this patient's social history and updated it with pertinent information if needed.  Social History     Tobacco Use     Smoking status: Never Smoker     Smokeless tobacco: Never Used   Substance Use Topics     Alcohol use: Not Currently     Comment: couple beers per  "week     Drug use: No       Family History     No significant family history, including no history of: GI bleeding    Prior to Admission Medications   Prior to Admission Medications   Prescriptions Last Dose Informant Patient Reported? Taking?   ibuprofen (ADVIL/MOTRIN) 200 MG tablet 12/12/2020  Yes Yes   Sig: Take 400 mg by mouth every 8 hours as needed for mild pain      Facility-Administered Medications: None     Allergies   Allergies   Allergen Reactions     Amoxicillin        Physical Exam   Vital Signs: Temp: 97.2  F (36.2  C) Temp src: Temporal BP: 118/79 Pulse: 73   Resp: 16 SpO2: 97 % O2 Device: None (Room air)    Weight: 205 lbs 0 oz    Constitutional: Awake, alert, cooperative, no apparent cardiopulmonary distress.  Eyes: Conjunctiva and pupils examined and normal.  HEENT: Moist mucous membranes, normal dentition.  Respiratory: Clear to auscultation bilaterally, no crackles or wheezing.  Cardiovascular: Regular rate and rhythm, normal S1 and S2, and no murmur noted.  GI: Soft, non-distended, non-tender to deep palpation, notes \"pressure, not pain\" with palpation, no rebound/guarding, normal bowel sounds.  Lymph/Hematologic: No anterior cervical or supraclavicular adenopathy.  Skin: No rashes, no cyanosis, no edema noted on exposed skin.  Musculoskeletal: No joint swelling, erythema or tenderness. No gross bony abnormalities  Neurologic: Cranial nerves 2-12 grossly intact, normal strength and sensation.  Psychiatric: Alert, oriented to person, place and time, no obvious anxiety or depression.      Data   Data reviewed today: I reviewed all medications, new labs and imaging results over the last 24 hours. I personally reviewed the abdominal CT image(s) showing Thickening of duodenum with outpouching concerning for perforation.    Recent Labs   Lab 12/16/20  1347   WBC 13.2*   HGB 11.5*   MCV 92   *      POTASSIUM 3.8   CHLORIDE 99   CO2 31   BUN 17   CR 0.82   ANIONGAP 5   NICK 8.2*   GLC 99 "   ALBUMIN 3.1*   PROTTOTAL 6.6*   BILITOTAL 0.3   ALKPHOS 70   ALT 17   AST 8   LIPASE 145   TROPI <0.015     Recent Results (from the past 24 hour(s))   CT Abdomen Pelvis w Contrast    Narrative    CT ABDOMEN AND PELVIS WITH CONTRAST  12/16/2020 3:26 PM    CLINICAL HISTORY: Acute generalized abdominal pain.    TECHNIQUE: CT scan of the abdomen and pelvis was performed following  injection of IV contrast. Multiplanar reformats were obtained. Dose  reduction techniques were used.  CONTRAST: 103 mL Isovue-370    COMPARISON: None.    FINDINGS:   LOWER CHEST: Indeterminate pulmonary nodule in the lingula measures  0.4 cm (series 4 image 9). Small hiatal hernia.    HEPATOBILIARY: Unremarkable. No hepatic masses are seen.    PANCREAS: Normal.    SPLEEN: Normal.    ADRENAL GLANDS: Normal.    KIDNEYS/BLADDER: Mild malrotation of the right kidney. Otherwise  unremarkable. No hydronephrosis.    BOWEL: There is mild bowel wall thickening with surrounding fat  stranding in the second portion of the duodenum. There is a small  amount of fluid extending through the duodenal wall in this region  posteriorly (series 4 image 72). No bowel obstruction. No free  intraperitoneal air. No intra-abdominal fluid collections.  Unremarkable appendix.    PELVIC ORGANS: Unremarkable.    LYMPH NODES: There is a mildly enlarged gastrohepatic ligament lymph  node (series 4 image 43) measuring 2.5 x 2 cm. Scattered mildly  prominent peripancreatic and lissette hepatis lymph nodes are also noted.    VASCULATURE: Retroaortic left renal vein is an anatomic variant.    ADDITIONAL FINDINGS: None.    MUSCULOSKELETAL: Unremarkable.      Impression    IMPRESSION:   1.  There is thickening of the second portion of the duodenum, with  mild associated inflammatory stranding, as well as an outpouching of  fluid through the duodenal wall in this region posteriorly.  Differential considerations include a contained perforation related to  a duodenal ulcer,  duodenitis, or duodenal diverticulitis. Endoscopy  may be helpful for further evaluation.  2.  Few mildly prominent and enlarged lymph nodes in the right upper  quadrant are nonspecific, but may be reactive and related to the  duodenal inflammation.  3.  Indeterminate pulmonary nodule in the lingula measures 0.4 cm.  Please refer to pulmonary nodule follow-up guidelines below.    Recommendations for one or multiple incidental lung nodules < 6mm:    Low risk patients: No routine follow-up.    High risk patients: Optional follow-up CT at 12 months; if  unchanged, no further follow-up.    *Low Risk: Minimal or absent history of smoking or other known risk  factors.  *Nonsolid (ground glass) or partly solid nodules may require longer  follow-up to exclude indolent adenocarcinoma.  *Recommendations based on Guidelines for the Management of Incidental  Pulmonary Nodules Detected at CT: From the Fleischner Society 2017,  Radiology 2017.  *Guidelines apply to incidental nodules in patients who are 35 years  or older.  *Guidelines do not apply to lung cancer screening, patients with  immunosuppression, or patients with known primary cancer.

## 2020-12-16 NOTE — PHARMACY-ADMISSION MEDICATION HISTORY
Pharmacy Medication History  Admission medication history interview status for the 12/16/2020  admission is complete. See EPIC admission navigator for prior to admission medications       Medication history sources: Patient, Surescripts and Care Everywhere  Location of interview: Phone  Adherence Assessment: Good    Significant changes made to the medication list:  Takes no meds on a regular basis .  Ibuprofen prn       Additional medication history information:        Medication reconciliation completed by provider prior to medication history? No    Time spent in this activity: 10min      Prior to Admission medications    Medication Sig Last Dose Taking? Auth Provider   ibuprofen (ADVIL/MOTRIN) 200 MG tablet Take 400 mg by mouth every 8 hours as needed for mild pain 12/12/2020 Yes Unknown, Entered By History       The information provided in this note is only as accurate as the sources available at the time of the update(s).

## 2020-12-16 NOTE — ED PROVIDER NOTES
"  History   Chief Complaint:  Nausea & Vomiting and Dizziness       The history is provided by the patient.      Simone Johnson is a 45 year old male with history of GERD who presents with nausea, vomiting and dark stools.  The patient states he developed upper abdominal pain, nausea, and vomiting last week about 12/10/20.  He notes he has had similar symptoms in the past, which he has self diagnosed as IBS, and decreased his gluten which usually resolves his symptoms.  He notes he felt improved over the weekend and then had return of vomiting.  Last night he noted onset of emesis of dark red blood like emesis and melena stools.  He notes mild upper abdominal tenderness.  He denies fever, emesis or diarrhea today.  He has not previously been seen by gastroenterology or had a colonoscopy or endoscopy.    Review of Systems   Constitutional: Negative for fever.   Respiratory: Negative for shortness of breath.    Cardiovascular: Negative for chest pain.   Gastrointestinal: Positive for abdominal pain, blood in stool (dark stool per patient), nausea and vomiting.   Genitourinary: Negative for dysuria.   Musculoskeletal: Negative for back pain.   Skin: Negative for rash.   Neurological: Negative for dizziness, weakness and headaches.   All other systems reviewed and are negative.    Allergies:  Amoxicillin    Medications:  Ranitidine    Past Medical History:    GERD    Past Surgical History:    Umbilical hernia repair  Vasectomy    Family History:    Father - heart disease    Social History:  The patient was not accompanied to the ED.  Smoking Status: Former smoker - quit 2006  Alcohol Use: Yes    Physical Exam     Patient Vitals for the past 24 hrs:   BP Temp Temp src Pulse Resp SpO2 Height Weight   12/16/20 1400 118/79 -- -- 73 -- 97 % -- --   12/16/20 1157 121/68 97.2  F (36.2  C) Temporal 107 16 99 % 1.803 m (5' 11\") 93 kg (205 lb)       Physical Exam  Nursing notes reviewed. Vitals reviewed.  General: Alert. Well " kept.  Eyes:  Conjunctiva non-injected, non-icteric.  Neck/Throat: Moist mucous membranes.  Normal voice.  Cardiac: Regular rhythm. Normal heart sounds with no murmur/rubs/click.   Pulmonary: Clear and equal breath sounds bilaterally. No crackles/rales. No wheezing  Abdomen: Soft. Non-distended. Mild upper abdominal tenderness. No masses. No peritoneal signs.  : Normal rectal tone.  Dark melena stool in rectal vault.  Musculoskeletal: Normal gross range of motion of all 4 extremities.    Neurological: Alert and oriented x4.   Skin: Warm and dry without rashes or petechiae. Normal appearance of visualized exposed skin.  Psych: Affect normal. Good eye contact.      Emergency Department Course     ECG:  Indication: Dizziness  Completed at 1417.  Read at 1535.   Normal sinus rhythm  T wave abnormality, consider inferior ischemia   Rate 72 bpm. AR interval 154. QRS duration 88. QT/QTc 412/451. P-R-T axes 55 83 -16.  Agree with computer interpretation.     Imaging:  CT Abdomen Pelvis w Contrast:  1.  There is thickening of the second portion of the duodenum, with  mild associated inflammatory stranding, as well as an outpouching of  fluid through the duodenal wall in this region posteriorly.  Differential considerations include a contained perforation related to  a duodenal ulcer, duodenitis, or duodenal diverticulitis. Endoscopy  may be helpful for further evaluation.  2.  Few mildly prominent and enlarged lymph nodes in the right upper  quadrant are nonspecific, but may be reactive and related to the  duodenal inflammation.  3.  Indeterminate pulmonary nodule in the lingula measures 0.4 cm.  Please refer to pulmonary nodule follow-up guidelines below.     Recommendations for one or multiple incidental lung nodules < 6mm:    Low risk patients: No routine follow-up.    High risk patients: Optional follow-up CT at 12 months; if  unchanged, no further follow-up.     *Low Risk: Minimal or absent history of smoking or other  known risk  factors.  *Nonsolid (ground glass) or partly solid nodules may require longer  follow-up to exclude indolent adenocarcinoma.  *Recommendations based on Guidelines for the Management of Incidental  Pulmonary Nodules Detected at CT: From the Fleischner Society 2017,  Radiology 2017.  *Guidelines apply to incidental nodules in patients who are 35 years  or older.  *Guidelines do not apply to lung cancer screening, patients with  immunosuppression, or patients with known primary cancer.  Report per radiology.    Laboratory:  CBC: WBC 13.2 (H), HGB 11.5 (L),  (H)  CMP: Calcium 8.2 (L), Albumin 3.1 (L), Protein 6.6 (L), o/w WNL (Creatinine 0.82)  Lipase: 145     (1347) Troponin I ES: <0.015    CRP Inflammation: 10.2 (H)  ESR: 36 (H)    Occult Blood Stool: Positive (A)    ABO/Rh Type and Screen: Pending    Influenza A & B: Pending  COVID-19 Virus (Coronavirus) by Nasopharyngeal (NP) Swab: Pending    Procedures  None    Emergency Department Course:    Reviewed:  1330 I reviewed the patient's nursing notes, vitals, past medical records, Care Everywhere.     Assessments:  1333 I initially evaluated the patient in ED room 21.  1552 I reassessed the patient.  1717 I reassessed the patient and discussed plan for admission.    Consults:   1654 I consulted with Dr. Cadet of the Gastroenterology service regarding patient.   1701 I consulted with Dr. Grover of the general surgery service regarding patient, who accepts patient for admission.  1720 I consulted with Dr. Lee of the hospitalist service regarding patient.  1746 I spoke with Dr. Lee after he consulted the patient in the emergency department.     Interventions:  1352 NS 1L IV Bolus  1353 Zofran 4 mg IV  1353 Protonix 40 mg IV  1820 Rocephin 1 g IV Bolus    Disposition:  The patient was admitted to the hospital under the care of Dr. Lee.     Impression & Plan     Medical Decision Making:  iSmone Johnson is a 45 year old male with a  history of GERD who presents with nausea, vomiting and dark stools. On exam, he has mild epigastric tenderness without peritoneal signs. His occult stool is positive. He does have a mild leukocytosis of 13.2 and is afebrile. He does also have a hemoglobin that is 11.5 down from 16.8, but that was in 2017. He has a normal hepatic panel and lipase and no specific RUQ tenderness making hepatobiliary disease unlikely. With these concerning symptoms, I did perform a CT of the abdomen and pelvis and this shows thickening of the second portion of the duodenum and inflammatory stranding and an outpouching of fluid through the duodenal wall concerning for possible duodenal ulcer/perforation, duodenitis, duodenal diverticulitis. No signs of free air or contrast within the abdomen. The patient was treated with IV Protonix and had improvement in symptoms. He was also given ceftriaxone for prophylaxis with concern for GI bleed. He does have an elevated CRP of 10.2 and a sed rate of 36. He was type and screened and signed blood consent as a precaution. No signs of  cardiovascular cause of his symptoms with a negative troponin and nonischemic EKG. I spoke with Dr. Cadet of GI and Dr. Grover from generally surgery who note they would like the patient admitted NPO with IV Protonix, and they will evaluate the patient inpatient. No indication for emergent surgery this evening without peritoneal signs or obvious free air. The patient has not required pain medication while in the ED and remains hemodynamically stable. He is in agreement with this plan. I spoke with Dr. Lee, hospitalist, who graciously accepts him for admission.    Diagnosis:    ICD-10-CM    1. Pulmonary nodules  R91.8 ABO/Rh type and screen     Asymptomatic Influenza A/B & SARS-CoV2 (COVID-19) Virus PCR Multiplex    Indeterminate pulmonary nodule in the lingula measures 0.4 cm.   2. Melena  K92.1    3. GI bleed  K92.2          Scribe Disclosure:  Katiana GUY  Pedro, am serving as a scribe at 1:33 PM on 12/16/2020 to document services personally performed by Angely Queen DNP based on my observations and the provider's statements to me.     Katiana Vasquez  12/16/2020  [facility] EMERGENCY DEPARTMENT           Angely Queen, CNP  12/16/20 2871

## 2020-12-16 NOTE — ED NOTES
"[facility]  ED Nurse Handoff Report    ED Chief complaint: Nausea & Vomiting and Dizziness      ED Diagnosis:   Final diagnoses:   None       Code Status: Full Code    Allergies:   Allergies   Allergen Reactions     Amoxicillin        Patient Story: NVD, rectal bleed  Focused Assessment:  Pt has been having bloody stools. Positive occult. Pt will be admitted for GI/OR to see. Pt will remain NPO after 5:14 PM while in ED.     Treatments and/or interventions provided: IVF  Patient's response to treatments and/or interventions: good    To be done/followed up on inpatient unit:  good    Does this patient have any cognitive concerns?: none    Activity level - Baseline/Home:  Independent  Activity Level - Current:   Independent    Patient's Preferred language: English   Needed?: No    Isolation: None  Infection: Not Applicable  Patient tested for COVID 19 prior to admission: YES  Bariatric?: No    Vital Signs:   Vitals:    12/16/20 1157 12/16/20 1400   BP: 121/68 118/79   Pulse: 107 73   Resp: 16    Temp: 97.2  F (36.2  C)    TempSrc: Temporal    SpO2: 99% 97%   Weight: 93 kg (205 lb)    Height: 1.803 m (5' 11\")        Cardiac Rhythm:     Was the PSS-3 completed:   Yes  What interventions are required if any?               Family Comments: none  OBS brochure/video discussed/provided to patient/family: N/A              Name of person given brochure if not patient: NA              Relationship to patient: NA    For the majority of the shift this patient's behavior was Green.   Behavioral interventions performed were none.    ED NURSE PHONE NUMBER: *[phone]         "

## 2020-12-17 LAB
ANION GAP SERPL CALCULATED.3IONS-SCNC: 1 MMOL/L (ref 3–14)
BUN SERPL-MCNC: 10 MG/DL (ref 7–30)
CALCIUM SERPL-MCNC: 7.8 MG/DL (ref 8.5–10.1)
CHLORIDE SERPL-SCNC: 104 MMOL/L (ref 94–109)
CO2 SERPL-SCNC: 30 MMOL/L (ref 20–32)
CREAT SERPL-MCNC: 0.83 MG/DL (ref 0.66–1.25)
ERYTHROCYTE [DISTWIDTH] IN BLOOD BY AUTOMATED COUNT: 12.4 % (ref 10–15)
GFR SERPL CREATININE-BSD FRML MDRD: >90 ML/MIN/{1.73_M2}
GLUCOSE SERPL-MCNC: 96 MG/DL (ref 70–99)
HCT VFR BLD AUTO: 26.3 % (ref 40–53)
HGB BLD-MCNC: 8.7 G/DL (ref 13.3–17.7)
HGB BLD-MCNC: 8.8 G/DL (ref 13.3–17.7)
HGB BLD-MCNC: 9 G/DL (ref 13.3–17.7)
MCH RBC QN AUTO: 31.7 PG (ref 26.5–33)
MCHC RBC AUTO-ENTMCNC: 34.2 G/DL (ref 31.5–36.5)
MCV RBC AUTO: 93 FL (ref 78–100)
PLATELET # BLD AUTO: 360 10E9/L (ref 150–450)
POTASSIUM SERPL-SCNC: 3.9 MMOL/L (ref 3.4–5.3)
RBC # BLD AUTO: 2.84 10E12/L (ref 4.4–5.9)
SODIUM SERPL-SCNC: 135 MMOL/L (ref 133–144)
WBC # BLD AUTO: 6.4 10E9/L (ref 4–11)

## 2020-12-17 PROCEDURE — 120N000001 HC R&B MED SURG/OB

## 2020-12-17 PROCEDURE — C9113 INJ PANTOPRAZOLE SODIUM, VIA: HCPCS | Performed by: STUDENT IN AN ORGANIZED HEALTH CARE EDUCATION/TRAINING PROGRAM

## 2020-12-17 PROCEDURE — 36415 COLL VENOUS BLD VENIPUNCTURE: CPT | Performed by: STUDENT IN AN ORGANIZED HEALTH CARE EDUCATION/TRAINING PROGRAM

## 2020-12-17 PROCEDURE — 85027 COMPLETE CBC AUTOMATED: CPT | Performed by: STUDENT IN AN ORGANIZED HEALTH CARE EDUCATION/TRAINING PROGRAM

## 2020-12-17 PROCEDURE — 99222 1ST HOSP IP/OBS MODERATE 55: CPT | Performed by: SURGERY

## 2020-12-17 PROCEDURE — 99232 SBSQ HOSP IP/OBS MODERATE 35: CPT | Performed by: STUDENT IN AN ORGANIZED HEALTH CARE EDUCATION/TRAINING PROGRAM

## 2020-12-17 PROCEDURE — 250N000011 HC RX IP 250 OP 636: Performed by: STUDENT IN AN ORGANIZED HEALTH CARE EDUCATION/TRAINING PROGRAM

## 2020-12-17 PROCEDURE — 80048 BASIC METABOLIC PNL TOTAL CA: CPT | Performed by: STUDENT IN AN ORGANIZED HEALTH CARE EDUCATION/TRAINING PROGRAM

## 2020-12-17 PROCEDURE — 85018 HEMOGLOBIN: CPT | Performed by: STUDENT IN AN ORGANIZED HEALTH CARE EDUCATION/TRAINING PROGRAM

## 2020-12-17 RX ADMIN — CEFTRIAXONE SODIUM 1 G: 1 INJECTION, POWDER, FOR SOLUTION INTRAMUSCULAR; INTRAVENOUS at 17:24

## 2020-12-17 RX ADMIN — PANTOPRAZOLE SODIUM 40 MG: 40 INJECTION, POWDER, FOR SOLUTION INTRAVENOUS at 21:14

## 2020-12-17 RX ADMIN — SODIUM CHLORIDE, SODIUM LACTATE, POTASSIUM CHLORIDE, CALCIUM CHLORIDE AND DEXTROSE MONOHYDRATE: 5; 600; 310; 30; 20 INJECTION, SOLUTION INTRAVENOUS at 05:18

## 2020-12-17 RX ADMIN — PANTOPRAZOLE SODIUM 40 MG: 40 INJECTION, POWDER, FOR SOLUTION INTRAVENOUS at 09:59

## 2020-12-17 ASSESSMENT — ACTIVITIES OF DAILY LIVING (ADL)
ADLS_ACUITY_SCORE: 14

## 2020-12-17 NOTE — CONSULTS
[facility] General Surgery Consultation    Simone Johnson MRN# [id number]   YOB: 1975 Age: 45 year old      Date of Admission:  12/16/2020  Date of Consult: 12/17/2020         Assessment and Plan:   Patient is a 45 year old male with duodenitis/duodenal ulcer with possible contained perforation, no evidence of pneumoperitoneum.     PLAN:  - Benign abdominal exam and no upper abdominal pain this morning. Continue conservative management. No surgery indicated at this time.  - Continue NPO, IVF, PPI, and antibiotic.  - Labs this morning.  - Appreciate GI consult for recommendations on timing of endoscopy and etiology of patients 3-year history of intermittent nausea, vomiting, and diarrhea.         Requesting Physician:      Dr. Toni Lee        Chief Complaint:     Chief Complaint   Patient presents with     Nausea & Vomiting     Dizziness          History of Present Illness:   Simone Johnson is a 45 year old male who was seen in consultation at the request of Dr. Toni Lee. Patient presented to the ED yesterday for 7 days of nausea and vomiting. The emesis became dark red blood and his stools turned black. Hemoccult was positive. The vomiting seemed to give rise to sharp upper abdominal pain. Vomiting made the pain worse and nothing seemed to help the pain. The pain was constant but now gone this morning. Britton states he was taking ibuprofen following wisdom teeth removal 12 days ago. Labs in the ED revealed leukocytosis of 13.2 with left shift, anemia of 11.5, elevated ESR of 36 and CRP of 10.2. CMP had low albumin of 3.1 and protein of 6.6, but otherwise unremarkable. Lipase and troponin were normal. CT demonstrated outpouching of the second portion of the duodenum concerning for contained perforation related to a duodenal ulcer, duodenitis, or duodenal diverticulitis. COVID test was negative.    Simone has chronic medical conditions including GERD, self-diagnosed  "gluten intolerance and IBS. He states he has been having intermittent nausea, vomiting, and diarrhea for 3 years. It is worse with drinking alcohol or eating dairy and gluten. Changes in his diet have improved this. He does have some diffuse abdominal pain with these bouts which is crampy in nature. A 4 mm pulmonary nodule was seen on CT scan. He has prior umbilical hernia repair and vasectomy. Family history includes heart disease. Britton drinks alcohol socially, he is a former smoker and quit in 2006, he does not use drugs.           Physical Exam:   Blood pressure 100/57, pulse 56, temperature 97.3  F (36.3  C), temperature source Oral, resp. rate 16, height 1.803 m (5' 11\"), weight 93 kg (205 lb), SpO2 99 %.  205 lbs 0 oz  General: Vital signs reviewed, in no apparent distress  Eyes: Anicteric  HENT: Normocephalic, atraumatic, trachea midline   Respiratory: Breathing nonlabored  Cardiovascular: Regular rate and rhythm  GI: Abdomen soft, nondistended, nontender throughout entire abdomen to light and deep palpation  Musculoskeletal: No gross deformities  Neurologic: Grossly nonfocal exam  Psychiatric: Normal mood, affect and insight  Integumentary: Warm and dry         Past Medical History:   History reviewed. No pertinent past medical history.         Past Surgical History:     Past Surgical History:   Procedure Laterality Date     HERNIA REPAIR              Current Medications:           cefTRIAXone  1 g Intravenous Q24H     pantoprazole (PROTONIX) IV  40 mg Intravenous BID     sodium chloride (PF)  3 mL Intracatheter Q8H       lidocaine 4%, lidocaine (buffered or not buffered), melatonin, ondansetron **OR** ondansetron, prochlorperazine **OR** prochlorperazine **OR** prochlorperazine, sodium chloride (PF)         Home Medications:     Prior to Admission medications    Medication Sig Last Dose Taking? Auth Provider   ibuprofen (ADVIL/MOTRIN) 200 MG tablet Take 400 mg by mouth every 8 hours as needed for mild pain " 12/12/2020 Yes Unknown, Entered By History            Allergies:     Allergies   Allergen Reactions     Amoxicillin             Family History:   History reviewed. No pertinent family history.        Social History:   Simone Johnson  reports that he has never smoked. He has never used smokeless tobacco. He reports previous alcohol use. He reports that he does not use drugs.          Review of Systems:   The 12 point Review of Systems is negative other than noted in the HPI.         Labs/Imaging   All new lab and imaging data was reviewed.   Recent Labs   Lab 12/17/20  0540 12/17/20  0037 12/16/20  1347   WBC 6.4  --  13.2*   HGB 9.0* 8.7* 11.5*   HCT 26.3*  --  33.9*   MCV 93  --  92     --  485*     Recent Labs   Lab 12/17/20  0540 12/16/20  1347    135   POTASSIUM 3.9 3.8   CHLORIDE 104 99   CO2 30 31   ANIONGAP 1* 5   GLC 96 99   BUN 10 17   CR 0.83 0.82   GFRESTIMATED >90 >90   GFRESTBLACK >90 >90   NICK 7.8* 8.2*   PROTTOTAL  --  6.6*   ALBUMIN  --  3.1*   BILITOTAL  --  0.3   ALKPHOS  --  70   AST  --  8   ALT  --  17       I have personally reviewed the imaging studies-     CT ABD AND PELVIS  IMPRESSION:   1.  There is thickening of the second portion of the duodenum, with  mild associated inflammatory stranding, as well as an outpouching of  fluid through the duodenal wall in this region posteriorly.  Differential considerations include a contained perforation related to  a duodenal ulcer, duodenitis, or duodenal diverticulitis. Endoscopy  may be helpful for further evaluation.  2.  Few mildly prominent and enlarged lymph nodes in the right upper  quadrant are nonspecific, but may be reactive and related to the  duodenal inflammation.  3.  Indeterminate pulmonary nodule in the lingula measures 0.4 cm.  Please refer to pulmonary nodule follow-up guidelines below.      Radha Serna PA-C

## 2020-12-17 NOTE — CONSULTS
[facility]  Gastroenterology Consultation         Simone Johnson  [address] [address]  45 year old male    Admission Date/Time: 12/16/2020  Primary Care Provider: Clinic, [facility]  Referring / Attending Physician: Dr. Guzmán    We were asked to see the patient in consultation by Dr. Guzmán for evaluation of abdominal pain GI bleed.      CC: Hematemesis melena    HPI:  Simone Johnson is a 45 year old male who presented to ER due to persistent abdominal pain for last 2 weeks patient developed acute onset of hematemesis and melena yesterday due to that patient presented to the ER patient's hemoglobin was in the range of 11 his baseline hemoglobin is around 16.  Patient has recurrent bouts of abdominal pain consistent with IBS in the past which patient believed.  However patient developed the symptoms at this time during his evaluation CT scan of the abdomen showed outpouching of second portion of duodenum with some further fluid collection outside of the duodenum concerning for possible localized ulcer with contained perforation versus duodenum patient also inflammation patient has been taking some ibuprofen's.  Patient is overall doing fairly well patient's hemoglobin dropped to 9 range today patient has been n.p.o.  Patient is denying any further episodes of hematemesis or melena patient is not having any significant abdominal pain patient's abdomen is fairly soft.  Patient denies any history of significant other systemic complaint rest of review of system is negative.  ROS: A comprehensive ten point review of systems was negative aside from those in mentioned in the HPI.      PAST MED HX:  I have reviewed this patient's medical history and updated it with pertinent information if needed.   History reviewed. No pertinent past medical history.    MEDICATIONS:   Prior to Admission Medications   Prescriptions Last Dose Informant Patient  Reported? Taking?   ibuprofen (ADVIL/MOTRIN) 200 MG tablet 12/12/2020  Yes Yes   Sig: Take 400 mg by mouth every 8 hours as needed for mild pain      Facility-Administered Medications: None       ALLERGIES:   Allergies   Allergen Reactions     Amoxicillin        SOCIAL HISTORY:  Social History     Tobacco Use     Smoking status: Never Smoker     Smokeless tobacco: Never Used   Substance Use Topics     Alcohol use: Not Currently     Comment: couple beers per week     Drug use: No       FAMILY HISTORY:  History reviewed. No pertinent family history.    PHYSICAL EXAM:   General awake alert oriented  Vital Signs with Ranges  Temp: 97.1  F (36.2  C) Temp src: Oral BP: 105/57 Pulse: 67   Resp: 16 SpO2: 98 % O2 Device: None (Room air)    No intake/output data recorded.    Constitutional: healthy, alert and no distress   Cardiovascular: negative, PMI normal. No lifts, heaves, or thrills. RRR. No murmurs, clicks gallops or rub  Respiratory: negative, Percussion normal. Good diaphragmatic excursion. Lungs clear  Neck: Neck supple. No adenopathy. Thyroid symmetric, normal size,, Carotids without bruits.  Abdomen: Abdomen soft, non-tender. BS normal. No masses, organomegaly  SKIN: no suspicious lesions or rashes          ADDITIONAL COMMENTS:   I reviewed the patient's new clinical lab test results.   Recent Labs   Lab Test 12/17/20  1230 12/17/20  0540 12/17/20  0037 12/16/20  1347 02/25/17  0600   WBC  --  6.4  --  13.2* 8.8   HGB 8.8* 9.0* 8.7* 11.5* 16.8   MCV  --  93  --  92 88   PLT  --  360  --  485* 290     Recent Labs   Lab Test 12/17/20  0540 12/16/20  1347 02/25/17  0600   POTASSIUM 3.9 3.8 3.4   CHLORIDE 104 99 95   CO2 30 31 27   BUN 10 17 11   ANIONGAP 1* 5 10     Recent Labs   Lab Test 12/16/20  1347 02/25/17  0600   ALBUMIN 3.1* 3.1*   BILITOTAL 0.3 0.7   ALT 17 15   AST 8 11   LIPASE 145 115       I reviewed the patient's new imaging results.        CONSULTATION ASSESSMENT AND PLAN:    Active Problems:     Melena  Very pleasant 45-year-old gentleman with history of acute onset of abdominal pain with episodes of hematemesis and melena along with significant drop in his hemoglobin patient's hemoglobin has been stable today patient is clinically doing well patient is not having any signs of acute abdomen.  I will recommend the patient continue on supportive care with IV fluids IV Protonix 40 mg twice a day and clear liquid diet.  If patient develops significant bleed patient would benefit from IR embolization of gastroduodenal artery patient will be at substantial high risk for worsening of perforation with endoscopic intervention at this point.  Alternate option including surgical interventions were discussed with patient however giving patient current status and minimal symptoms I will recommend the conservative management with close monitoring of serial hemoglobins and supportive care.  If patient decompensated then consider above-mentioned options.  Continue on clear liquid diet today if patient remains stable hemodynamically and hemoglobin remained stable advance to full liquid diet tomorrow possible discharge after 48 to 72 hours on oral Protonix.  Protonix.  Patient will need endoscopic evaluation in the next 4 to 6  weeks after and evaluation for H. pylori.  Risk-benefit plan discussed in detail with patient.  Thank you very much for letting me participate in his care.            Kyle Cadet MD, FACP  [facility].  Office: [phone]  Cell : [phone]

## 2020-12-17 NOTE — PLAN OF CARE
From ED at 1900. A & O X 4, VSS, no c/o pain. Had been having emesis and dark stools, none since arriving to the floor. Up ad nora. Strict NPO, getting IVF @ 100 mL/hr. GI consulted and plan for possible endoscopy tomorrow.

## 2020-12-17 NOTE — PROGRESS NOTES
RECEIVING UNIT ED HANDOFF REVIEW    ED Nurse Handoff Report was reviewed by: Zofia Mayen RN on December 16, 2020 at 6:45 PM

## 2020-12-17 NOTE — PLAN OF CARE
A&Ox4, pleasant. VSS on RA ex hypotensive, MD aware. Denies pain. Skin intact, up ind to BR, denies lightheadedness/dizziness. No emesis or stools overnight. NPO. D5LR @ 100/hr. Hgb stable this AM at 9.0. GI to see this AM, possible endoscopy today.

## 2020-12-17 NOTE — PROGRESS NOTES
[facility]    Hospitalist Progress Note      Assessment & Plan   Simone Johnson is a 45 year old male admitted on 12/16/2020. He presents with dark stools and emesis. FTH possible duodenal perforation.      Concern for duodenal ulcer with contained perforation  Hematemesis  Melena  Leukocytosis-resolved  Patient presents with 7 days of nausea vomiting that involved into dark stools and emesis day prior to arrival.  Hemoccult positive.  CT demonstrates a outpouching of the second portion of the duodenum concerning for ulcer with perforation.  Unclear etiology.  He did take 3 days of ibuprofen after a wisdom tooth removal 12 days prior to arrival  -formal GI consult today however plan for likely endoscopy, surgery also following  -continue strict NPO with IVF and IV PPI  -continue rocephin for now     Normocytic, normochromic anemia, likely due to acute blood loss  -no further melena or vomiting since arrival Hb 11>9  -Hb drop likely partly dilutional without any further bleeding. Stable since last evening  -continue plan as above and follow for any signs of overt bleeding    Reported gluten intolerance  Reported IBS  -Likely will need gluten-free diet once eating     Pulmonary nodule  CT demonstrated 4 mm pulmonary nodule in the lingula.  Patient is low risk.  No need for follow-up     DVT Prophylaxis: Low Risk/Ambulatory with no VTE prophylaxis indicated  Code Status: Full Code  Expected discharge: pending further clinical work up and clearance by GI and surgery    Unique Guzmán DO  Text Page (7am - 6pm)    Interval History   Patient comfortable in bed this morning. Denies any further vomiting or diarrhea since arrival. Not nauseated and actually hungry. Denies pain. Breathing stable. No chest pain. No cough.     -Data reviewed today: I reviewed all new labs and imaging results over the last 24 hours. I personally reviewed CT imaging that demonstrates perforated bowel.    Physical  Exam   Temp: 97.3  F (36.3  C) Temp src: Oral BP: 100/57 Pulse: 56   Resp: 16 SpO2: 99 % O2 Device: None (Room air)    Vitals:    12/16/20 1157   Weight: 93 kg (205 lb)     Vital Signs with Ranges  Temp:  [97.2  F (36.2  C)-99.2  F (37.3  C)] 97.3  F (36.3  C)  Pulse:  [] 56  Resp:  [16] 16  BP: ()/(50-79) 100/57  SpO2:  [97 %-99 %] 99 %  No intake/output data recorded.    Constitutional: Awake, alert, cooperative, no apparent distress  Respiratory: Clear to auscultation bilaterally, no crackles or wheezing  Cardiovascular: Regular rate and rhythm, normal S1 and S2, and no murmur noted  GI: Normal bowel sounds, soft, non-distended, TTP in RLQ, no guarding  Skin/Integumen: No rashes, no cyanosis, no edema      Medications     dextrose 5% lactated ringers 100 mL/hr at 12/17/20 0518       cefTRIAXone  1 g Intravenous Q24H     pantoprazole (PROTONIX) IV  40 mg Intravenous BID     sodium chloride (PF)  3 mL Intracatheter Q8H       Data   Recent Labs   Lab 12/17/20  0540 12/17/20  0037 12/16/20  1347   WBC 6.4  --  13.2*   HGB 9.0* 8.7* 11.5*   MCV 93  --  92     --  485*     --  135   POTASSIUM 3.9  --  3.8   CHLORIDE 104  --  99   CO2 30  --  31   BUN 10  --  17   CR 0.83  --  0.82   ANIONGAP 1*  --  5   INCK 7.8*  --  8.2*   GLC 96  --  99   ALBUMIN  --   --  3.1*   PROTTOTAL  --   --  6.6*   BILITOTAL  --   --  0.3   ALKPHOS  --   --  70   ALT  --   --  17   AST  --   --  8   LIPASE  --   --  145   TROPI  --   --  <0.015       Recent Results (from the past 24 hour(s))   CT Abdomen Pelvis w Contrast    Narrative    CT ABDOMEN AND PELVIS WITH CONTRAST  12/16/2020 3:26 PM    CLINICAL HISTORY: Acute generalized abdominal pain.    TECHNIQUE: CT scan of the abdomen and pelvis was performed following  injection of IV contrast. Multiplanar reformats were obtained. Dose  reduction techniques were used.  CONTRAST: 103 mL Isovue-[id number]    COMPARISON: None.    FINDINGS:   LOWER CHEST: Indeterminate pulmonary  nodule in the lingula measures  0.4 cm (series 4 image 9). Small hiatal hernia.    HEPATOBILIARY: Unremarkable. No hepatic masses are seen.    PANCREAS: Normal.    SPLEEN: Normal.    ADRENAL GLANDS: Normal.    KIDNEYS/BLADDER: Mild malrotation of the right kidney. Otherwise  unremarkable. No hydronephrosis.    BOWEL: There is mild bowel wall thickening with surrounding fat  stranding in the second portion of the duodenum. There is a small  amount of fluid extending through the duodenal wall in this region  posteriorly (series 4 image 72). No bowel obstruction. No free  intraperitoneal air. No intra-abdominal fluid collections.  Unremarkable appendix.    PELVIC ORGANS: Unremarkable.    LYMPH NODES: There is a mildly enlarged gastrohepatic ligament lymph  node (series 4 image 43) measuring 2.5 x 2 cm. Scattered mildly  prominent peripancreatic and lissette hepatis lymph nodes are also noted.    VASCULATURE: Retroaortic left renal vein is an anatomic variant.    ADDITIONAL FINDINGS: None.    MUSCULOSKELETAL: Unremarkable.      Impression    IMPRESSION:   1.  There is thickening of the second portion of the duodenum, with  mild associated inflammatory stranding, as well as an outpouching of  fluid through the duodenal wall in this region posteriorly.  Differential considerations include a contained perforation related to  a duodenal ulcer, duodenitis, or duodenal diverticulitis. Endoscopy  may be helpful for further evaluation.  2.  Few mildly prominent and enlarged lymph nodes in the right upper  quadrant are nonspecific, but may be reactive and related to the  duodenal inflammation.  3.  Indeterminate pulmonary nodule in the lingula measures 0.4 cm.  Please refer to pulmonary nodule follow-up guidelines below.    Recommendations for one or multiple incidental lung nodules < 6mm:    Low risk patients: No routine follow-up.    High risk patients: Optional follow-up CT at 12 months; if  unchanged, no further  follow-up.    *Low Risk: Minimal or absent history of smoking or other known risk  factors.  *Nonsolid (ground glass) or partly solid nodules may require longer  follow-up to exclude indolent adenocarcinoma.  *Recommendations based on Guidelines for the Management of Incidental  Pulmonary Nodules Detected at CT: From the Fleischner Society 2017,  Radiology 2017.  *Guidelines apply to incidental nodules in patients who are 35 years  or older.  *Guidelines do not apply to lung cancer screening, patients with  immunosuppression, or patients with known primary cancer.    JERARDO MOLINA MD

## 2020-12-18 LAB
ANION GAP SERPL CALCULATED.3IONS-SCNC: 3 MMOL/L (ref 3–14)
BASOPHILS # BLD AUTO: 0.1 10E9/L (ref 0–0.2)
BASOPHILS NFR BLD AUTO: 1.4 %
BUN SERPL-MCNC: 5 MG/DL (ref 7–30)
CALCIUM SERPL-MCNC: 7.9 MG/DL (ref 8.5–10.1)
CHLORIDE SERPL-SCNC: 105 MMOL/L (ref 94–109)
CO2 SERPL-SCNC: 30 MMOL/L (ref 20–32)
CREAT SERPL-MCNC: 0.81 MG/DL (ref 0.66–1.25)
DIFFERENTIAL METHOD BLD: ABNORMAL
EOSINOPHIL # BLD AUTO: 0.1 10E9/L (ref 0–0.7)
EOSINOPHIL NFR BLD AUTO: 2.5 %
ERYTHROCYTE [DISTWIDTH] IN BLOOD BY AUTOMATED COUNT: 12.4 % (ref 10–15)
GFR SERPL CREATININE-BSD FRML MDRD: >90 ML/MIN/{1.73_M2}
GLUCOSE SERPL-MCNC: 91 MG/DL (ref 70–99)
HCT VFR BLD AUTO: 27.7 % (ref 40–53)
HGB BLD-MCNC: 9.4 G/DL (ref 13.3–17.7)
IMM GRANULOCYTES # BLD: 0 10E9/L (ref 0–0.4)
IMM GRANULOCYTES NFR BLD: 0 %
LYMPHOCYTES # BLD AUTO: 1.1 10E9/L (ref 0.8–5.3)
LYMPHOCYTES NFR BLD AUTO: 23.4 %
MCH RBC QN AUTO: 31.8 PG (ref 26.5–33)
MCHC RBC AUTO-ENTMCNC: 33.9 G/DL (ref 31.5–36.5)
MCV RBC AUTO: 94 FL (ref 78–100)
MONOCYTES # BLD AUTO: 0.4 10E9/L (ref 0–1.3)
MONOCYTES NFR BLD AUTO: 8.7 %
NEUTROPHILS # BLD AUTO: 3.1 10E9/L (ref 1.6–8.3)
NEUTROPHILS NFR BLD AUTO: 64 %
NRBC # BLD AUTO: 0 10*3/UL
NRBC BLD AUTO-RTO: 0 /100
PLATELET # BLD AUTO: 430 10E9/L (ref 150–450)
POTASSIUM SERPL-SCNC: 3.8 MMOL/L (ref 3.4–5.3)
RBC # BLD AUTO: 2.96 10E12/L (ref 4.4–5.9)
SODIUM SERPL-SCNC: 138 MMOL/L (ref 133–144)
WBC # BLD AUTO: 4.8 10E9/L (ref 4–11)

## 2020-12-18 PROCEDURE — 36415 COLL VENOUS BLD VENIPUNCTURE: CPT | Performed by: STUDENT IN AN ORGANIZED HEALTH CARE EDUCATION/TRAINING PROGRAM

## 2020-12-18 PROCEDURE — 250N000011 HC RX IP 250 OP 636: Performed by: STUDENT IN AN ORGANIZED HEALTH CARE EDUCATION/TRAINING PROGRAM

## 2020-12-18 PROCEDURE — 99207 PR CDG-MDM COMPONENT: MEETS MODERATE - UP CODED: CPT | Performed by: STUDENT IN AN ORGANIZED HEALTH CARE EDUCATION/TRAINING PROGRAM

## 2020-12-18 PROCEDURE — 99232 SBSQ HOSP IP/OBS MODERATE 35: CPT | Performed by: STUDENT IN AN ORGANIZED HEALTH CARE EDUCATION/TRAINING PROGRAM

## 2020-12-18 PROCEDURE — 80048 BASIC METABOLIC PNL TOTAL CA: CPT | Performed by: STUDENT IN AN ORGANIZED HEALTH CARE EDUCATION/TRAINING PROGRAM

## 2020-12-18 PROCEDURE — C9113 INJ PANTOPRAZOLE SODIUM, VIA: HCPCS | Performed by: STUDENT IN AN ORGANIZED HEALTH CARE EDUCATION/TRAINING PROGRAM

## 2020-12-18 PROCEDURE — 120N000001 HC R&B MED SURG/OB

## 2020-12-18 PROCEDURE — 85025 COMPLETE CBC W/AUTO DIFF WBC: CPT | Performed by: STUDENT IN AN ORGANIZED HEALTH CARE EDUCATION/TRAINING PROGRAM

## 2020-12-18 PROCEDURE — 99231 SBSQ HOSP IP/OBS SF/LOW 25: CPT | Performed by: PHYSICIAN ASSISTANT

## 2020-12-18 RX ADMIN — PANTOPRAZOLE SODIUM 40 MG: 40 INJECTION, POWDER, FOR SOLUTION INTRAVENOUS at 20:48

## 2020-12-18 RX ADMIN — PANTOPRAZOLE SODIUM 40 MG: 40 INJECTION, POWDER, FOR SOLUTION INTRAVENOUS at 08:43

## 2020-12-18 RX ADMIN — CEFTRIAXONE SODIUM 1 G: 1 INJECTION, POWDER, FOR SOLUTION INTRAMUSCULAR; INTRAVENOUS at 17:45

## 2020-12-18 ASSESSMENT — ACTIVITIES OF DAILY LIVING (ADL)
ADLS_ACUITY_SCORE: 14

## 2020-12-18 NOTE — DISCHARGE INSTRUCTIONS
You will require a follow up with Dr. Chichi HILL.  His office will contact you regarding this follow up.  If you have questions regarding this follow up please contact Dr. Cadet's office at [phone]

## 2020-12-18 NOTE — PROGRESS NOTES
"Surgery    No complaints this morning.   No abdominal pain.  No nausea, emesis.  Tolerating clears  Hungry  No bm yet today but going to try soon.   No CP, Dyspnea  Walking some.     Gen:  Awake, Alert, NAD  Blood pressure 113/64, pulse 54, temperature 96.7  F (35.9  C), temperature source Oral, resp. rate 16, height 1.803 m (5' 11\"), weight 93 kg (205 lb), SpO2 100 %.  Resp - relaxed   Abdomen - soft, non tender, non distended.   Extremities - no lower extremity edema or tenderness    Hgb 9.4 (8.8  Wbc 4.8    A/P   Patient is a 45 year old male with duodenitis/duodenal ulcer without active concerns.     - continue IVF (tapering down today), PPI, Abx.  - advance to full liquids  - plan delineated by Dr. Cadet.  - no anticipation of surgical needs, surgery will sign off. Call with further questions if patient remains over weekend.   - discussed plan with patient.    Rosales White PA-C  Office: [phone]  Pager: [phone]    "

## 2020-12-18 NOTE — PROGRESS NOTES
Writer met with the patient at the bedside.  Patient A+Ox4 up ad nora.  Patient may be cleared for discharge to home in the next day or two pending clearance from GI.  Patient does not currently have a PCP he is aware that he will require a PCP with possible labs within a week of discharge.  Patient states he knows the clinic that he would like to go to ([facility]) he would like to call on his own.  Patient aware that he will require follow up with Dr. Cadet and Dr. Cadet's office will call him.  Writer included Dr. Cadet's information on AVS.   Patient aware that Care Coordinator is here on the weekend if he has questions or other needs for discharge.

## 2020-12-18 NOTE — PROGRESS NOTES
[facility]    Hospitalist Progress Note      Assessment & Plan   Simone Johnson is a 45 year old male admitted on 12/16/2020. He presents with dark stools and emesis. FTH possible duodenal perforation.      Concern for duodenal ulcer with contained perforation  Hematemesis-resolved  Melena-resolved  Leukocytosis-resolved  Patient presented with 7 days of nausea vomiting that involved into dark stools and emesis day prior to arrival.  Hemoccult positive.  CT demonstrates a outpouching of the second portion of the duodenum concerning for ulcer with perforation.  Unclear etiology.  He did take 3 days of ibuprofen after a wisdom tooth removal 12 days prior to arrival  - due to stability and clinical improvement on admission , GI evaluated patient yesterday and recommended to hold off on endoscopy at this time  - he was advanced to CLD yesterday evening and tolerated it well  - will advance to full liquid today and continue to follow for diet tolerance, may be able to discharge tomorrow if tolerating least restrictive diet  -continue PPI and will need to discharge on PPI  -continue IV antibiotics for now     Normocytic, normochromic anemia, likely due to acute blood loss  -no further melena or vomiting since arrival Hb stable  -continue plan as above and follow for any signs of overt bleeding    Reported gluten intolerance  Reported IBS  -Likely will need gluten-free diet once eating     Pulmonary nodule  CT demonstrated 4 mm pulmonary nodule in the lingula.  Patient is low risk.  No need for follow-up     DVT Prophylaxis: Low Risk/Ambulatory with no VTE prophylaxis indicated  Code Status: Full Code  Expected discharge: Possible discharge tomorrow or Sunday pending diet tolerance and clearance by GI. He will need to discharge with PPI, antibiotics and close GI follow up    Unique Guzmán, DO  Text Page (7am - 6pm)    Interval History   Patient eating his CLD this morning and doing well.  Denies pain and he is very hopeful because he is feeling a lot better. No nausea. No vomiting. No BM since admission. No cough, chest pain or SOA. No swelling in his legs. No dizziness. No headaches.    -Data reviewed today: I reviewed all new labs and imaging results over the last 24 hours. I personally reviewed CT imaging that demonstrates perforated bowel.    Physical Exam   Temp: 96.7  F (35.9  C) Temp src: Oral BP: 113/64 Pulse: 54   Resp: 16 SpO2: 100 % O2 Device: None (Room air)    Vitals:    12/16/20 1157   Weight: 93 kg (205 lb)     Vital Signs with Ranges  Temp:  [96.7  F (35.9  C)-97.8  F (36.6  C)] 96.7  F (35.9  C)  Pulse:  [54-67] 54  Resp:  [16] 16  BP: ()/(53-64) 113/64  SpO2:  [98 %-100 %] 100 %  No intake/output data recorded.    Constitutional: Awake, alert, cooperative, no apparent distress  Respiratory: Clear to auscultation bilaterally, no crackles or wheezing  Cardiovascular: Regular rate and rhythm, normal S1 and S2, and no murmur noted  GI: Normal bowel sounds, soft, non-distended, no guarding and tenderness resolved  Skin/Integumen: No rashes, no cyanosis, no edema      Medications       cefTRIAXone  1 g Intravenous Q24H     pantoprazole (PROTONIX) IV  40 mg Intravenous BID     sodium chloride (PF)  3 mL Intracatheter Q8H       Data   Recent Labs   Lab 12/18/20  0726 12/17/20  1230 12/17/20  0540 12/16/20  1347 12/16/20  1347   WBC 4.8  --  6.4  --  13.2*   HGB 9.4* 8.8* 9.0*   < > 11.5*   MCV 94  --  93  --  92     --  360  --  485*     --  135  --  135   POTASSIUM 3.8  --  3.9  --  3.8   CHLORIDE 105  --  104  --  99   CO2 30  --  30  --  31   BUN 5*  --  10  --  17   CR 0.81  --  0.83  --  0.82   ANIONGAP 3  --  1*  --  5   NICK 7.9*  --  7.8*  --  8.2*   GLC 91  --  96  --  99   ALBUMIN  --   --   --   --  3.1*   PROTTOTAL  --   --   --   --  6.6*   BILITOTAL  --   --   --   --  0.3   ALKPHOS  --   --   --   --  70   ALT  --   --   --   --  17   AST  --   --   --   --   8   LIPASE  --   --   --   --  145   TROPI  --   --   --   --  <0.015    < > = values in this interval not displayed.       No results found for this or any previous visit (from the past 24 hour(s)).

## 2020-12-18 NOTE — PROGRESS NOTES
A&Ox4. VSS BP soft but stable. Denies pain. Skin intact, up ind to BR, denies lightheadedness/dizziness. No emesis or stools. Started clears this evening but encouraged to go slow.  Hgb stable monitored q6h.  No surgery indicated.  Will follow up outpatient with GI.  Plan to discharge to home Saturday.

## 2020-12-18 NOTE — PLAN OF CARE
A & O x 4. VSS, no c/o pain. Up ad nora. No emesis or stools. Tolerating clears. Plan to discharge home Saturday if hgb stable and will follow up w/GI outpatient.

## 2020-12-19 LAB
ANION GAP SERPL CALCULATED.3IONS-SCNC: 5 MMOL/L (ref 3–14)
BASOPHILS # BLD AUTO: 0.1 10E9/L (ref 0–0.2)
BASOPHILS NFR BLD AUTO: 1.2 %
BUN SERPL-MCNC: 4 MG/DL (ref 7–30)
CALCIUM SERPL-MCNC: 8 MG/DL (ref 8.5–10.1)
CHLORIDE SERPL-SCNC: 104 MMOL/L (ref 94–109)
CO2 SERPL-SCNC: 28 MMOL/L (ref 20–32)
CREAT SERPL-MCNC: 0.84 MG/DL (ref 0.66–1.25)
DIFFERENTIAL METHOD BLD: ABNORMAL
EOSINOPHIL # BLD AUTO: 0.1 10E9/L (ref 0–0.7)
EOSINOPHIL NFR BLD AUTO: 2.2 %
ERYTHROCYTE [DISTWIDTH] IN BLOOD BY AUTOMATED COUNT: 12.4 % (ref 10–15)
GFR SERPL CREATININE-BSD FRML MDRD: >90 ML/MIN/{1.73_M2}
GLUCOSE SERPL-MCNC: 176 MG/DL (ref 70–99)
HCT VFR BLD AUTO: 30.4 % (ref 40–53)
HGB BLD-MCNC: 10 G/DL (ref 13.3–17.7)
IMM GRANULOCYTES # BLD: 0 10E9/L (ref 0–0.4)
IMM GRANULOCYTES NFR BLD: 0.5 %
LYMPHOCYTES # BLD AUTO: 0.9 10E9/L (ref 0.8–5.3)
LYMPHOCYTES NFR BLD AUTO: 22.1 %
MCH RBC QN AUTO: 30.9 PG (ref 26.5–33)
MCHC RBC AUTO-ENTMCNC: 32.9 G/DL (ref 31.5–36.5)
MCV RBC AUTO: 94 FL (ref 78–100)
MONOCYTES # BLD AUTO: 0.2 10E9/L (ref 0–1.3)
MONOCYTES NFR BLD AUTO: 4.3 %
NEUTROPHILS # BLD AUTO: 2.9 10E9/L (ref 1.6–8.3)
NEUTROPHILS NFR BLD AUTO: 69.7 %
NRBC # BLD AUTO: 0 10*3/UL
NRBC BLD AUTO-RTO: 0 /100
PLATELET # BLD AUTO: 535 10E9/L (ref 150–450)
POTASSIUM SERPL-SCNC: 3.3 MMOL/L (ref 3.4–5.3)
POTASSIUM SERPL-SCNC: 3.9 MMOL/L (ref 3.4–5.3)
RBC # BLD AUTO: 3.24 10E12/L (ref 4.4–5.9)
SODIUM SERPL-SCNC: 137 MMOL/L (ref 133–144)
WBC # BLD AUTO: 4.2 10E9/L (ref 4–11)

## 2020-12-19 PROCEDURE — 80048 BASIC METABOLIC PNL TOTAL CA: CPT | Performed by: STUDENT IN AN ORGANIZED HEALTH CARE EDUCATION/TRAINING PROGRAM

## 2020-12-19 PROCEDURE — 99232 SBSQ HOSP IP/OBS MODERATE 35: CPT | Performed by: HOSPITALIST

## 2020-12-19 PROCEDURE — 85025 COMPLETE CBC W/AUTO DIFF WBC: CPT | Performed by: STUDENT IN AN ORGANIZED HEALTH CARE EDUCATION/TRAINING PROGRAM

## 2020-12-19 PROCEDURE — 250N000011 HC RX IP 250 OP 636: Performed by: STUDENT IN AN ORGANIZED HEALTH CARE EDUCATION/TRAINING PROGRAM

## 2020-12-19 PROCEDURE — 120N000001 HC R&B MED SURG/OB

## 2020-12-19 PROCEDURE — 250N000013 HC RX MED GY IP 250 OP 250 PS 637: Performed by: HOSPITALIST

## 2020-12-19 PROCEDURE — 36415 COLL VENOUS BLD VENIPUNCTURE: CPT | Performed by: HOSPITALIST

## 2020-12-19 PROCEDURE — C9113 INJ PANTOPRAZOLE SODIUM, VIA: HCPCS | Performed by: STUDENT IN AN ORGANIZED HEALTH CARE EDUCATION/TRAINING PROGRAM

## 2020-12-19 PROCEDURE — 36415 COLL VENOUS BLD VENIPUNCTURE: CPT | Performed by: STUDENT IN AN ORGANIZED HEALTH CARE EDUCATION/TRAINING PROGRAM

## 2020-12-19 PROCEDURE — 84132 ASSAY OF SERUM POTASSIUM: CPT | Performed by: HOSPITALIST

## 2020-12-19 RX ORDER — POTASSIUM CHLORIDE 1500 MG/1
40 TABLET, EXTENDED RELEASE ORAL ONCE
Status: COMPLETED | OUTPATIENT
Start: 2020-12-19 | End: 2020-12-19

## 2020-12-19 RX ADMIN — PANTOPRAZOLE SODIUM 40 MG: 40 INJECTION, POWDER, FOR SOLUTION INTRAVENOUS at 09:34

## 2020-12-19 RX ADMIN — CEFTRIAXONE SODIUM 1 G: 1 INJECTION, POWDER, FOR SOLUTION INTRAMUSCULAR; INTRAVENOUS at 17:59

## 2020-12-19 RX ADMIN — POTASSIUM CHLORIDE 40 MEQ: 1500 TABLET, EXTENDED RELEASE ORAL at 11:40

## 2020-12-19 RX ADMIN — PANTOPRAZOLE SODIUM 40 MG: 40 INJECTION, POWDER, FOR SOLUTION INTRAVENOUS at 20:23

## 2020-12-19 ASSESSMENT — ACTIVITIES OF DAILY LIVING (ADL)
ADLS_ACUITY_SCORE: 14

## 2020-12-19 NOTE — PLAN OF CARE
A & O x 4. VSS on room air.  Denied pain.  BM x1 today was medium sized, formed and black.  Tolerating full liquid diet.  IV rocephin.  Ambulating independently within room and in  halls.  May discharge tomorrow pending continued progress.  Recommendations for PCP given to pt by writer.  Nursing will continue to monitor.

## 2020-12-19 NOTE — PLAN OF CARE
A/O x4. VSS. Denies pain and N/V. Full liquid diet. No BM's this shift. Up ind in room. Possible discharge today pending diet tolerance and GI sign off.

## 2020-12-19 NOTE — PROGRESS NOTES
[facility]    Hospitalist Progress Note      Assessment & Plan   Simone Johnson is a 45 year old male admitted on 12/16/2020. He presents with dark stools and emesis. FTH possible duodenal perforation.      Concern for duodenal ulcer with contained perforation  Hematemesis-resolved  Melena-resolved  Leukocytosis-resolved  Patient presented with 7 days of nausea vomiting that involved into dark stools and emesis day prior to arrival.  Hemoccult positive.  CT demonstrates a outpouching of the second portion of the duodenum concerning for ulcer with perforation.  Unclear etiology.  He did take 3 days of ibuprofen q 6hours; after a wisdom tooth removal 12 days prior to arrival  -GI, , Dr Cadet evaluated patient and recommended to hold off on endoscopy at this time, advance to clear liquid diet then full liquid.  Recommends discharge after 72 hours IV PPI, last dose 3 days IV at 2300 this evening.  Outpatient EGD endoscopy at 1 month, sooner if clinically warranted..  -Hemoglobin this morning stable, advance to mechanical soft, if tolerates advance to regular this evening, recheck hemoglobin in the morning.  - DC IV antibiotics on discharge if continues afebrile and stable.       Normocytic, normochromic anemia, likely due to acute blood loss  -no further melena or vomiting since arrival Hb stable  -continue plan as above and follow for any signs of overt bleeding    Reported gluten intolerance  Reported IBS  -Likely will need gluten-free diet once eating     Pulmonary nodule  CT demonstrated 4 mm pulmonary nodule in the lingula.  Patient is low risk.  No need for follow-up     DVT Prophylaxis: Low Risk/Ambulatory with no VTE prophylaxis indicated  Code Status: Full Code  Expected discharge: Likely tomorrow pending stable hemoglobin, tolerating advancement to regular diet  Angelica Milner MD  Text Page (7am - 6pm)    Interval History   Tolerated full liquid diet.  Denies abdominal pain,  "nausea, emesis, no further melena.  Besides the ibuprofen PTA as contributory to DU, he reports use of alcohol only occasionally on weekends.  He reports increased \"stress\" at work with increased workload with decreased staffing.  No anxiety or depression.    -Data reviewed today: I reviewed all new labs and imaging results over the last 24 hours.Physical Exam   Temp: 96.7  F (35.9  C) Temp src: Oral BP: 107/70 Pulse: 57   Resp: 16 SpO2: 100 % O2 Device: None (Room air)    Vitals:    12/16/20 1157   Weight: 93 kg (205 lb)     Vital Signs with Ranges  Temp:  [96.7  F (35.9  C)-97.9  F (36.6  C)] 96.7  F (35.9  C)  Pulse:  [53-60] 57  Resp:  [16] 16  BP: (102-113)/(56-70) 107/70  SpO2:  [99 %-100 %] 100 %  I/O last 3 completed shifts:  In: -   Out: 50 [Urine:50]    Constitutional: NAD, alert, calm, cooperative  respiratory: Clear to auscultation bilaterally, no crackles or wheezing.  Respirations nonlabored  Cardiovascular: Regular rate and rhythm, normal S1 and S2, and no murmur noted  GI: Normal bowel sounds, soft, non-distended, no guarding and tenderness.  Skin/Integumen: No rashes, no cyanosis, no edema  Neurologic, moves all 4 extremities, nonfocal  Psych: Oriented, affect calm      Medications       cefTRIAXone  1 g Intravenous Q24H     pantoprazole (PROTONIX) IV  40 mg Intravenous BID     sodium chloride (PF)  3 mL Intracatheter Q8H       Data   Recent Labs   Lab 12/19/20  0952 12/18/20  0726 12/17/20  1230 12/17/20  0540 12/16/20  1347 12/16/20  1347   WBC 4.2 4.8  --  6.4  --  13.2*   HGB 10.0* 9.4* 8.8* 9.0*   < > 11.5*   MCV 94 94  --  93  --  92   * 430  --  360  --  485*    138  --  135  --  135   POTASSIUM 3.3* 3.8  --  3.9  --  3.8   CHLORIDE 104 105  --  104  --  99   CO2 28 30  --  30  --  31   BUN 4* 5*  --  10  --  17   CR 0.84 0.81  --  0.83  --  0.82   ANIONGAP 5 3  --  1*  --  5   NICK 8.0* 7.9*  --  7.8*  --  8.2*   * 91  --  96  --  99   ALBUMIN  --   --   --   --   --  3.1* "   PROTTOTAL  --   --   --   --   --  6.6*   BILITOTAL  --   --   --   --   --  0.3   ALKPHOS  --   --   --   --   --  70   ALT  --   --   --   --   --  17   AST  --   --   --   --   --  8   LIPASE  --   --   --   --   --  145   TROPI  --   --   --   --   --  <0.015    < > = values in this interval not displayed.

## 2020-12-19 NOTE — PROGRESS NOTES
[facility]    Hospitalist Progress Note  Interval History   Doing well and tolerating diet. CBC has been stable.    All other review of systems are negative.    Assessment & Plan   Simone Johnson is a 45 year old male who was admitted on 12/16/2020. GI consulted for GI bleeding but w/ perforated duodenal ulcer hence EGD was not performed and conservative tx started.  -currently pt tolerating diet and H/H has been stable  -pt can be discharged from GI standpoint w/ PPI BID and Abx  -follow up in GI clinic in 1-2 weeks, repeat EGD about 1 month later  -we discussed about signs of bleed such as melena and hematemesis    [address] level 2 follow up      Code Status: Full Code    -Data reviewed today: I reviewed all new labs and imaging results over the last 24 hours.     Physical Exam   Temp: 96.7  F (35.9  C) Temp src: Oral BP: 107/70 Pulse: 57   Resp: 16 SpO2: 100 % O2 Device: None (Room air)    Vitals:    12/16/20 1157   Weight: 93 kg (205 lb)     Vital Signs with Ranges  Temp:  [96.7  F (35.9  C)-97.9  F (36.6  C)] 96.7  F (35.9  C)  Pulse:  [53-60] 57  Resp:  [16] 16  BP: (102-113)/(56-70) 107/70  SpO2:  [99 %-100 %] 100 %  I/O last 3 completed shifts:  In: -   Out: 50 [Urine:50]    Constitutional: Awake, alert, cooperative, no apparent distress  Respiratory: Clear to auscultation bilaterally, no crackles or wheezing  Cardiovascular: Regular rate and rhythm, normal S1 and S2, and no murmur noted  GI: Normal bowel sounds, soft, non-distended, non-tender  Skin/Integumen: No rashes, no cyanosis, no edema  Other:     Fransisca Bradley MD  ([facility]  Office: [phone]  Cell: [phone], please feel free to call the cell    Medications       cefTRIAXone  1 g Intravenous Q24H     pantoprazole (PROTONIX) IV  40 mg Intravenous BID     sodium chloride (PF)  3 mL Intracatheter Q8H       Data   Recent Labs   Lab 12/19/20  0952 12/18/20  0726 12/17/20  1237  12/17/20  0540 12/16/20  1347 12/16/20  1347   WBC 4.2 4.8  --  6.4  --  13.2*   HGB 10.0* 9.4* 8.8* 9.0*   < > 11.5*   MCV 94 94  --  93  --  92   * 430  --  360  --  485*    138  --  135  --  135   POTASSIUM 3.3* 3.8  --  3.9  --  3.8   CHLORIDE 104 105  --  104  --  99   CO2 28 30  --  30  --  31   BUN 4* 5*  --  10  --  17   CR 0.84 0.81  --  0.83  --  0.82   ANIONGAP 5 3  --  1*  --  5   NICK 8.0* 7.9*  --  7.8*  --  8.2*   * 91  --  96  --  99   ALBUMIN  --   --   --   --   --  3.1*   PROTTOTAL  --   --   --   --   --  6.6*   BILITOTAL  --   --   --   --   --  0.3   ALKPHOS  --   --   --   --   --  70   ALT  --   --   --   --   --  17   AST  --   --   --   --   --  8   LIPASE  --   --   --   --   --  145   TROPI  --   --   --   --   --  <0.015    < > = values in this interval not displayed.       No results found for this or any previous visit (from the past 24 hour(s)).     Fransisca Bradley MD  ([facility]  Office: [phone]  Cell: [phone]

## 2020-12-19 NOTE — PLAN OF CARE
A/O x4. VSS. Denies pain.  IV saline locked.  Ambulating independently in halls.  Good appetite with soft foods.  Formed, dark black stool in AM.  Plan for two more doses of  IV protonix and then probably discharge to home tomorrow.  Nursing will continue to monitor.

## 2020-12-20 VITALS
OXYGEN SATURATION: 100 % | SYSTOLIC BLOOD PRESSURE: 104 MMHG | HEART RATE: 58 BPM | DIASTOLIC BLOOD PRESSURE: 70 MMHG | TEMPERATURE: 96.8 F | WEIGHT: 205 LBS | HEIGHT: 71 IN | BODY MASS INDEX: 28.7 KG/M2 | RESPIRATION RATE: 16 BRPM

## 2020-12-20 LAB — HGB BLD-MCNC: 10.4 G/DL (ref 13.3–17.7)

## 2020-12-20 PROCEDURE — C9113 INJ PANTOPRAZOLE SODIUM, VIA: HCPCS | Performed by: STUDENT IN AN ORGANIZED HEALTH CARE EDUCATION/TRAINING PROGRAM

## 2020-12-20 PROCEDURE — 85018 HEMOGLOBIN: CPT | Performed by: HOSPITALIST

## 2020-12-20 PROCEDURE — 250N000013 HC RX MED GY IP 250 OP 250 PS 637: Performed by: HOSPITALIST

## 2020-12-20 PROCEDURE — 99239 HOSP IP/OBS DSCHRG MGMT >30: CPT | Performed by: HOSPITALIST

## 2020-12-20 PROCEDURE — 36415 COLL VENOUS BLD VENIPUNCTURE: CPT | Performed by: HOSPITALIST

## 2020-12-20 PROCEDURE — 250N000011 HC RX IP 250 OP 636: Performed by: STUDENT IN AN ORGANIZED HEALTH CARE EDUCATION/TRAINING PROGRAM

## 2020-12-20 RX ORDER — CEFDINIR 300 MG/1
300 CAPSULE ORAL 2 TIMES DAILY
Qty: 13 CAPSULE | Refills: 0 | Status: SHIPPED | OUTPATIENT
Start: 2020-12-20 | End: 2020-12-28

## 2020-12-20 RX ORDER — AMOXICILLIN 250 MG
1 CAPSULE ORAL 2 TIMES DAILY
Status: DISCONTINUED | OUTPATIENT
Start: 2020-12-20 | End: 2020-12-20 | Stop reason: CLARIF

## 2020-12-20 RX ORDER — PANTOPRAZOLE SODIUM 40 MG/1
40 TABLET, DELAYED RELEASE ORAL DAILY
Qty: 60 TABLET | Refills: 0 | Status: SHIPPED | OUTPATIENT
Start: 2020-12-20

## 2020-12-20 RX ORDER — CEFDINIR 300 MG/1
300 CAPSULE ORAL ONCE
Status: COMPLETED | OUTPATIENT
Start: 2020-12-20 | End: 2020-12-20

## 2020-12-20 RX ORDER — BISACODYL 10 MG
10 SUPPOSITORY, RECTAL RECTAL ONCE
Status: DISCONTINUED | OUTPATIENT
Start: 2020-12-20 | End: 2020-12-20 | Stop reason: CLARIF

## 2020-12-20 RX ADMIN — PANTOPRAZOLE SODIUM 40 MG: 40 INJECTION, POWDER, FOR SOLUTION INTRAVENOUS at 09:40

## 2020-12-20 RX ADMIN — CEFDINIR 300 MG: 300 CAPSULE ORAL at 11:24

## 2020-12-20 ASSESSMENT — ACTIVITIES OF DAILY LIVING (ADL)
ADLS_ACUITY_SCORE: 14

## 2020-12-20 NOTE — PLAN OF CARE
A/O x4. VSS. Denies pain and N/V. Rregular diet, tolerated well. Up independent in room. Continent of B/B. Plan to discharge home today.

## 2020-12-20 NOTE — PLAN OF CARE
Discharge instructions reviewed with pt.  IV removed.  Medications given to pt.  Pt left unit at 1310 via wheelchair to transportation provided by wife.  Denied pain at discharge.

## 2020-12-20 NOTE — PLAN OF CARE
Pt A/Ox4, VSS on RA, denies pain. BS active, LS clear. Advanced to regular diet for dinner, tolerating with no nausea. Ambulating independently, no stools this shift. IV abx and IV protonix continued. Plan to discharge to home tomorrow, follow up with GI in 1 month. Continue to monitor.

## 2020-12-20 NOTE — CONSULTS
Care Management Initial Consult    General Information  Assessment completed with: Simone Main  Type of CM/SW Visit: Initial Assessment    Primary Care Provider verified and updated as needed: No(The pt declined help finding a new PCP)   Readmission within the last 30 days: no previous admission in last 30 days      Reason for Consult: discharge planning  Advance Care Planning: Advance Care Planning Reviewed: no concerns identified          Communication Assessment  Patient's communication style: spoken language (English or Bilingual)    Hearing Difficulty or Deaf: no   Wear Glasses or Blind: no    Cognitive  Cognitive/Neuro/Behavioral: WDL                      Living Environment:   People in home: spouse     Current living Arrangements: house      Able to return to prior arrangements: yes       Family/Social Support:  Care provided by: self  Provides care for: no one  Marital Status:   Wife          Description of Support System: Supportive, Involved         Current Resources:   Skilled Home Care Services:    Community Resources: None  Equipment currently used at home: none  Supplies currently used at home: None    Employment/Financial:  Employment Status: employed full-time        Financial Concerns: No concerns identified           Lifestyle & Psychosocial Needs:        Socioeconomic History     Marital status:      Spouse name: Not on file     Number of children: Not on file     Years of education: Not on file     Highest education level: Not on file     Tobacco Use     Smoking status: Never Smoker     Smokeless tobacco: Never Used   Substance and Sexual Activity     Alcohol use: Not Currently     Comment: couple beers per week     Drug use: No       Functional Status:  Prior to admission patient needed assistance: none             Mental Health Status:  Mental Health Status: No Current Concerns       Chemical Dependency Status:  Chemical Dependency Status: No Current Concerns              Values/Beliefs:  Spiritual, Cultural Beliefs, Uatsdin Practices, Values that affect care:      Not assessed    Care Management Discharge Note    Discharge Date: 12/20/20       Discharge Disposition: Home    Discharge Services: None    Discharge DME: None    Discharge Transportation: car, drives self    Private pay costs discussed: Not applicable    PAS Confirmation Code:  NA  Patient/family educated on Medicare website which has current facility and service quality ratings: no    Education Provided on the Discharge Plan:  Yes with pt  Persons Notified of Discharge Plans: Discussed with pt  Patient/Family in Agreement with the Plan: yes    Handoff Referral Completed: No the pt does not have a PCP    Additional Information:  I spoke with Simone and he clearly stated he does not want any help finding a PCP.  He knows the number for the [facility] that he had gone to a few years ago.  I asked if he wanted to review the providers at a clinic closest to his home and he declined.  This information was added to the AVS.  The pt declines having any discharge needs and I did not assess any other than needing a PCP.      Rylie Hutton RN, BSN Care Coordinator  [facility]  Mobile: [phone]

## 2020-12-20 NOTE — DISCHARGE SUMMARY
[facility]    Discharge Summary  Hospitalist    Date of Admission:  12/16/2020  Date of Discharge:  12/20/2020  Discharging Provider: Angelica Milner MD  Date of Service (when I saw the patient): 12/20/20      History of Present Illness   Simone Johnson is a 45 year old male admitted on 12/16/2020. He presents with dark stools and emesis.     Hospital Course   Simone Johnson was admitted on 12/16/2020.  The following problems were addressed during his hospitalization:    Concern for duodenal ulcer with contained perforation per CT  Hematemesis-resolved  Melena-resolved  Leukocytosis-resolved  Patient presented with 7 days of nausea vomiting that involved into dark stools and emesis day prior to arrival.  Hemoccult positive.  CT demonstrates a outpouching of the second portion of the duodenum concerning for ulcer with perforation.  Unclear etiology.  He did take 3 days of ibuprofen q 6hours; after a wisdom tooth removal 12 days prior to arrival  -GI, , Dr Cadet evaluated patient and recommended to hold off on endoscopy at this time, advanced diet; tolerated well to regular diet on discharge.   Recommends discharge after 72 hours IV PPI, then transition to po bid.   -  Outpatient GI f/u at 2 wks and  EGD endoscopy at 1 month, sooner if clinically warranted..  -Hemoglobin have remained stable; discharged at 10.4.  .-Patient is being discharged today 12/20/2020 with GI follow-up as above on Protonix p.o twice daily and GI recommended continuation of antibiotics.  He has been receiving Rocephin since admission [3-day dosing], will transition to Omnicef to complete a 10-day course per GI, first dose in hospital this morning..  Advised patient to discontinue NSAID/aspirin as well as alcohol.  Patient requests RTW letter, written.  He will have a follow-up appointment with PCP at 1 week where determination of further workability will be made.  Hemoglobin at that time.    Normocytic,  normochromic anemia, due to acute blood loss  -no further melena or vomiting since arrival, hgb have been stable       Reported gluten intolerance  Reported IBS  -Likely will need gluten-free diet once eating     Pulmonary nodule  CT demonstrated 4 mm pulmonary nodule in the lingula.  Patient is low risk.  follow-up per PCP.      Code Status   Full Code       Primary Care Physician   [facility]    Physical Exam   Temp: 96.8  F (36  C) Temp src: Oral BP: 104/70 Pulse: 58   Resp: 16 SpO2: 100 % O2 Device: None (Room air)    Vitals:    12/16/20 1157   Weight: 93 kg (205 lb)       Constitutional: NAD, alert, calm, cooperative  respiratory: Clear to auscultation bilaterally, no crackles or wheezing.  Respirations nonlabored  Cardiovascular: Regular rate and rhythm, normal S1 and S2, and no murmur noted  GI: Normal bowel sounds, soft, non-distended, no guarding and tenderness.  Skin/Integumen: No rashes, no cyanosis, no edema  Neurologic, moves all 4 extremities, nonfocal  Psych: Oriented, affect calm    Discharge Disposition   Discharged to home  Condition at discharge: Good    Consultations This Hospital Stay   GASTROENTEROLOGY IP CONSULT  CARE MANAGEMENT / SOCIAL WORK IP CONSULT    Time Spent on this Encounter   I, Angelica Milner MD, personally saw the patient today and spent greater than 30 minutes discharging this patient.    Discharge Orders      Reason for your hospital stay    Presented with melana [blood in stool].     Follow-up and recommended labs and tests     Follow up with primary care provider, [facility], within 7 days for hospital follow- up.  The following labs/tests are recommended: Hemoglobin and BMP  .        Follow up with Dr. Cadet, GI at 2 wks and repeat endoscopy at 4 wks.     Activity    Your activity upon discharge: activity as tolerated    Return to Work Letter; Written.  Follow-up with PCP at 1 wk for any ongoing work determinations.     Full  Code     Diet    Follow this diet upon discharge:        Regular Diet Adult; NO alcohol     Discharge Medications   Current Discharge Medication List      START taking these medications    Details   cefdinir (OMNICEF) 300 MG capsule Take 1 capsule (300 mg) by mouth 2 times daily ; first dose this evening.  NO REFills.  Qty: 13 capsule, Refills: 0    Associated Diagnoses: Gastrointestinal hemorrhage associated with duodenal ulcer      pantoprazole (PROTONIX) 40 MG EC tablet Take 1 tablet (40 mg) by mouth daily Future refills by PCP Dr[facility] with phone number None.  Qty: 60 tablet, Refills: 0    Associated Diagnoses: Gastrointestinal hemorrhage associated with duodenal ulcer         STOP taking these medications       ibuprofen (ADVIL/MOTRIN) 200 MG tablet Comments:   Reason for Stopping:             Allergies   Allergies   Allergen Reactions     Amoxicillin      Data   Most Recent 3 CBC's:  Recent Labs   Lab Test 12/20/20  0929 12/19/20  0952 12/18/20  0726 12/17/20  0540 12/17/20  0540   WBC  --  4.2 4.8  --  6.4   HGB 10.4* 10.0* 9.4*   < > 9.0*   MCV  --  94 94  --  93   PLT  --  535* 430  --  360    < > = values in this interval not displayed.      Most Recent 3 BMP's:  Recent Labs   Lab Test 12/19/20  1735 12/19/20  0952 12/18/20  0726 12/17/20  0540   NA  --  137 138 135   POTASSIUM 3.9 3.3* 3.8 3.9   CHLORIDE  --  104 105 104   CO2  --  28 30 30   BUN  --  4* 5* 10   CR  --  0.84 0.81 0.83   ANIONGAP  --  5 3 1*   NICK  --  8.0* 7.9* 7.8*   GLC  --  176* 91 96     Most Recent 2 LFT's:  Recent Labs   Lab Test 12/16/20  1347 02/25/17  0600   AST 8 11   ALT 17 15   ALKPHOS 70 54   BILITOTAL 0.3 0.7

## 2020-12-20 NOTE — PROVIDER NOTIFICATION
12/20/2020      To Whom it May Concern,      Simone Johnson was admitted to [facility] on 12/16/2020 and discharged on 12/20/2020.      The patient's current medical condition supports work release through 12/28/20 at which time the patient will have a scheduled followup appointment with Primary Provider where an updated assessment of workability and return to work will be made.     If questions, please call [facility] Hospitalist Service, Dr Milner.      Sincerely,          Internal Medicine/Hospitalist Service  [facility]

## 2020-12-28 ENCOUNTER — OFFICE VISIT (OUTPATIENT)
Dept: INTERNAL MEDICINE | Facility: CLINIC | Age: 45
End: 2020-12-28
Payer: COMMERCIAL

## 2020-12-28 VITALS
SYSTOLIC BLOOD PRESSURE: 120 MMHG | WEIGHT: 206.3 LBS | OXYGEN SATURATION: 99 % | HEART RATE: 88 BPM | RESPIRATION RATE: 16 BRPM | DIASTOLIC BLOOD PRESSURE: 64 MMHG | BODY MASS INDEX: 28.77 KG/M2

## 2020-12-28 DIAGNOSIS — Z09 ENCOUNTER FOR EXAMINATION FOLLOWING TREATMENT AT HOSPITAL: Primary | ICD-10-CM

## 2020-12-28 DIAGNOSIS — K26.4 GASTROINTESTINAL HEMORRHAGE ASSOCIATED WITH DUODENAL ULCER: ICD-10-CM

## 2020-12-28 DIAGNOSIS — E88.09 HYPOALBUMINEMIA: ICD-10-CM

## 2020-12-28 DIAGNOSIS — D62 ANEMIA DUE TO BLOOD LOSS, ACUTE: ICD-10-CM

## 2020-12-28 DIAGNOSIS — E87.6 HYPOKALEMIA: ICD-10-CM

## 2020-12-28 LAB
ALBUMIN SERPL-MCNC: 3.3 G/DL (ref 3.4–5)
ALP SERPL-CCNC: 79 U/L (ref 40–150)
ALT SERPL W P-5'-P-CCNC: 21 U/L (ref 0–70)
ANION GAP SERPL CALCULATED.3IONS-SCNC: <1 MMOL/L (ref 3–14)
AST SERPL W P-5'-P-CCNC: 12 U/L (ref 0–45)
BILIRUB SERPL-MCNC: 0.3 MG/DL (ref 0.2–1.3)
BUN SERPL-MCNC: 12 MG/DL (ref 7–30)
CALCIUM SERPL-MCNC: 8.2 MG/DL (ref 8.5–10.1)
CHLORIDE SERPL-SCNC: 110 MMOL/L (ref 94–109)
CO2 SERPL-SCNC: 31 MMOL/L (ref 20–32)
CREAT SERPL-MCNC: 0.9 MG/DL (ref 0.66–1.25)
ERYTHROCYTE [DISTWIDTH] IN BLOOD BY AUTOMATED COUNT: 12.7 % (ref 10–15)
GFR SERPL CREATININE-BSD FRML MDRD: >90 ML/MIN/{1.73_M2}
GLUCOSE SERPL-MCNC: 86 MG/DL (ref 70–99)
HCT VFR BLD AUTO: 32.2 % (ref 40–53)
HGB BLD-MCNC: 10 G/DL (ref 13.3–17.7)
MCH RBC QN AUTO: 30.1 PG (ref 26.5–33)
MCHC RBC AUTO-ENTMCNC: 31.1 G/DL (ref 31.5–36.5)
MCV RBC AUTO: 97 FL (ref 78–100)
PLATELET # BLD AUTO: 565 10E9/L (ref 150–450)
POTASSIUM SERPL-SCNC: 4.3 MMOL/L (ref 3.4–5.3)
PROT SERPL-MCNC: 6.8 G/DL (ref 6.8–8.8)
RBC # BLD AUTO: 3.32 10E12/L (ref 4.4–5.9)
SODIUM SERPL-SCNC: 140 MMOL/L (ref 133–144)
WBC # BLD AUTO: 4.2 10E9/L (ref 4–11)

## 2020-12-28 PROCEDURE — 36415 COLL VENOUS BLD VENIPUNCTURE: CPT | Performed by: INTERNAL MEDICINE

## 2020-12-28 PROCEDURE — 99203 OFFICE O/P NEW LOW 30 MIN: CPT | Performed by: INTERNAL MEDICINE

## 2020-12-28 PROCEDURE — 85027 COMPLETE CBC AUTOMATED: CPT | Performed by: INTERNAL MEDICINE

## 2020-12-28 PROCEDURE — 80053 COMPREHEN METABOLIC PANEL: CPT | Performed by: INTERNAL MEDICINE

## 2020-12-28 NOTE — PATIENT INSTRUCTIONS
- Keep your GI appointments  - Take the Protonix  - I will send you a message on [facility] when I am able to look at the results of your tests from today

## 2021-01-05 ENCOUNTER — TRANSFERRED RECORDS (OUTPATIENT)
Dept: HEALTH INFORMATION MANAGEMENT | Facility: CLINIC | Age: 46
End: 2021-01-05

## 2021-01-15 ENCOUNTER — HEALTH MAINTENANCE LETTER (OUTPATIENT)
Age: 46
End: 2021-01-15

## 2021-01-20 ENCOUNTER — TRANSFERRED RECORDS (OUTPATIENT)
Dept: HEALTH INFORMATION MANAGEMENT | Facility: CLINIC | Age: 46
End: 2021-01-20

## 2021-02-09 ENCOUNTER — TRANSFERRED RECORDS (OUTPATIENT)
Dept: HEALTH INFORMATION MANAGEMENT | Facility: CLINIC | Age: 46
End: 2021-02-09

## 2021-03-31 ENCOUNTER — MYC MEDICAL ADVICE (OUTPATIENT)
Dept: INTERNAL MEDICINE | Facility: CLINIC | Age: 46
End: 2021-03-31

## 2021-04-26 ENCOUNTER — MYC MEDICAL ADVICE (OUTPATIENT)
Dept: INTERNAL MEDICINE | Facility: CLINIC | Age: 46
End: 2021-04-26

## 2021-07-01 NOTE — PROGRESS NOTES
Subjective     Simone Johnson is a 45 year old male who presents to clinic today for the following health issues:    HPI   Hospital Follow-up Visit:    Hospital/Nursing Home/IP Rehab Facility: [facility]  Date of Admission: 12/16/2020  Date of Discharge: 12/20/2020  Reason(s) for Admission: GI bleed       Was your hospitalization related to COVID-19? No   Problems taking medications regularly:  None  Medication changes since discharge: None  Problems adhering to non-medication therapy:  None    Summary of hospitalization:  [facility] discharge summary reviewed  Diagnostic Tests/Treatments reviewed.  Follow up needed: EGD  Other Healthcare Providers Involved in Patient s Care:         Specialist appointment - GI  Update since discharge: improved.  Post Discharge Medication Reconciliation: discharge medications reconciled, continue medications without change.  Plan of care communicated with patient       Simone presents today for a follow-up visit from a recent hospitalization. My summarization of his hospital stay (per his report and my review of his records) is as follows.    Simone tells me before this hospitalization he was taking 3200mg of ibuprofen post-wisdom tooth removal. He has a history of possible IBS, diagnosed ~4 years ago. I reviewed his MRI and U/S from 2016 with him in clinic today. He began having abdominal pain until 12/15 when he began vomiting up blood. He presented to the ER the next day with persistent symptoms. Hgb found to be ~8. CT a/p showed concern for duodenal ulcer with possible perforation. GI consulted and recommended conservative measures with outpatient EGD in a few weeks. He responded well to PPI and his abdominal pain was better by 12/18. Discharged on cefdinir to finish a 10-day course and PPI every day. He has completed the cefdinir and remains on the Protonix.    Simone tells me today he's feeling better. He thinks the Protonix are really working. He  has follow-up scheduled on Jan 5 with Dr. Cadet's clinic. Endoscopy scheduled Jan 20. He has no complaints today. He would like to establish care with me as his PCP today.    Review of Systems   Constitutional, HEENT, cardiovascular, pulmonary, gi and gu systems are negative, except as otherwise noted.      Objective    /64   Pulse 88   Resp 16   Wt 93.6 kg (206 lb 4.8 oz)   SpO2 99%   BMI 28.77 kg/m    Body mass index is 28.77 kg/m .     Physical Exam   GENERAL alert and in no distress.  EYES conjunctivae/corneas clear. PERRL. EOMs grossly intact  HENT: NC/AT, facies symmetric  NECK: Neck supple. No LAD, without thyroidmegaly or JVD.  LYMPH: no cervical LAD appreciated  RESP: CTAB. No w/r/r.  CV: RRR, no m/r/g.  GI: NT, ND, without rebound or guarding, no CVA tenderness  MSK: No cyanosis, clubbing or edema noted bilaterally in upper and/or lower extremities  SKIN: no significant ulcers, lesions or rashes on the visualized portions of the skin  NEURO: Alert. Oriented. Gait normal. Sensation grossly WNL.  PSYCH: Linear thought process. Speech normal rate and volume; able to articulate logical thoughts, able to abstract reason. No tangential thoughts, hallucinations, or delusions.    Labs and imaging reviewed with Simone today.    Assessment & Plan     Encounter for examination following treatment at hospital  Simone is improved from his recent hospitalization. He has good GI follow-up scheduled. His symptoms are gone. He has completed his antibiotics and remains on PPI. He would like to establish with me as his PCP and I encouraged him to come back after his GI work-up for a full physical exam.    Gastrointestinal hemorrhage associated with duodenal ulcer  Anemia due to blood loss, acute  Discussed findings of CT scan and labs with Simone today. Strongly reiterated the importance of good, close GI follow-up which he already has scheduled. He's off antibiotics. Continues on PPI daily. He knows what red  flag symptoms to look out for. We'll recheck his CBC today to ensure continued improvement.  - CBC with platelets    Hypokalemia  Hypoalbuminemia  Seen on CMP during recent hospitalization. Expect these are related to acute illness. Will re-check.  - Comprehensive metabolic panel       Return in about 6 weeks (around 2/8/2021) for Physical Exam.    Keegan Finley MD  [facility]   no

## 2021-08-01 ENCOUNTER — TRANSFERRED RECORDS (OUTPATIENT)
Dept: HEALTH INFORMATION MANAGEMENT | Facility: CLINIC | Age: 46
End: 2021-08-01

## 2021-09-25 ENCOUNTER — HEALTH MAINTENANCE LETTER (OUTPATIENT)
Age: 46
End: 2021-09-25

## 2021-10-27 ASSESSMENT — ENCOUNTER SYMPTOMS
SHORTNESS OF BREATH: 0
COUGH: 0
DIARRHEA: 0
JOINT SWELLING: 0
SORE THROAT: 0
HEMATURIA: 0
NAUSEA: 0
ABDOMINAL PAIN: 0
ARTHRALGIAS: 0
FEVER: 0
HEMATOCHEZIA: 0
HEADACHES: 0
PARESTHESIAS: 0
DYSURIA: 0
DIZZINESS: 0
CONSTIPATION: 0
CHILLS: 0
PALPITATIONS: 0
HEARTBURN: 0
MYALGIAS: 0
WEAKNESS: 0
FREQUENCY: 0
NERVOUS/ANXIOUS: 0
EYE PAIN: 0

## 2021-11-03 ENCOUNTER — OFFICE VISIT (OUTPATIENT)
Dept: INTERNAL MEDICINE | Facility: CLINIC | Age: 46
End: 2021-11-03
Payer: COMMERCIAL

## 2021-11-03 VITALS
BODY MASS INDEX: 32.38 KG/M2 | TEMPERATURE: 97.3 F | OXYGEN SATURATION: 98 % | DIASTOLIC BLOOD PRESSURE: 72 MMHG | SYSTOLIC BLOOD PRESSURE: 112 MMHG | HEART RATE: 57 BPM | HEIGHT: 71 IN | WEIGHT: 231.3 LBS

## 2021-11-03 DIAGNOSIS — D64.9 NORMOCYTIC ANEMIA: ICD-10-CM

## 2021-11-03 DIAGNOSIS — Z87.19 HISTORY OF GI BLEED: ICD-10-CM

## 2021-11-03 DIAGNOSIS — Z13.1 ENCOUNTER FOR SCREENING FOR DIABETES MELLITUS: ICD-10-CM

## 2021-11-03 DIAGNOSIS — Z23 ENCOUNTER FOR IMMUNIZATION: ICD-10-CM

## 2021-11-03 DIAGNOSIS — Z13.220 ENCOUNTER FOR SCREENING FOR LIPID DISORDER: ICD-10-CM

## 2021-11-03 DIAGNOSIS — R91.8 PULMONARY NODULES: ICD-10-CM

## 2021-11-03 DIAGNOSIS — G47.33 OSA (OBSTRUCTIVE SLEEP APNEA): ICD-10-CM

## 2021-11-03 DIAGNOSIS — Z00.00 ROUTINE GENERAL MEDICAL EXAMINATION AT A HEALTH CARE FACILITY: Primary | ICD-10-CM

## 2021-11-03 LAB
ERYTHROCYTE [DISTWIDTH] IN BLOOD BY AUTOMATED COUNT: 12.9 % (ref 10–15)
HCT VFR BLD AUTO: 45.6 % (ref 40–53)
HGB BLD-MCNC: 15.2 G/DL (ref 13.3–17.7)
MCH RBC QN AUTO: 31.4 PG (ref 26.5–33)
MCHC RBC AUTO-ENTMCNC: 33.3 G/DL (ref 31.5–36.5)
MCV RBC AUTO: 94 FL (ref 78–100)
PLATELET # BLD AUTO: 282 10E3/UL (ref 150–450)
RBC # BLD AUTO: 4.84 10E6/UL (ref 4.4–5.9)
WBC # BLD AUTO: 6 10E3/UL (ref 4–11)

## 2021-11-03 PROCEDURE — 85027 COMPLETE CBC AUTOMATED: CPT | Performed by: INTERNAL MEDICINE

## 2021-11-03 PROCEDURE — 90686 IIV4 VACC NO PRSV 0.5 ML IM: CPT | Performed by: INTERNAL MEDICINE

## 2021-11-03 PROCEDURE — 80048 BASIC METABOLIC PNL TOTAL CA: CPT | Performed by: INTERNAL MEDICINE

## 2021-11-03 PROCEDURE — 90471 IMMUNIZATION ADMIN: CPT | Performed by: INTERNAL MEDICINE

## 2021-11-03 PROCEDURE — 36415 COLL VENOUS BLD VENIPUNCTURE: CPT | Performed by: INTERNAL MEDICINE

## 2021-11-03 PROCEDURE — 80061 LIPID PANEL: CPT | Performed by: INTERNAL MEDICINE

## 2021-11-03 PROCEDURE — 99396 PREV VISIT EST AGE 40-64: CPT | Mod: 25 | Performed by: INTERNAL MEDICINE

## 2021-11-03 ASSESSMENT — ENCOUNTER SYMPTOMS
NAUSEA: 0
WEAKNESS: 0
DIARRHEA: 0
PALPITATIONS: 0
FEVER: 0
CONSTIPATION: 0
ARTHRALGIAS: 0
DIZZINESS: 0
ABDOMINAL PAIN: 0
CHILLS: 0
COUGH: 0
NERVOUS/ANXIOUS: 0
JOINT SWELLING: 0
HEARTBURN: 0
SHORTNESS OF BREATH: 0
HEADACHES: 0
FREQUENCY: 0
PARESTHESIAS: 0
DYSURIA: 0
HEMATURIA: 0
HEMATOCHEZIA: 0
EYE PAIN: 0
SORE THROAT: 0
MYALGIAS: 0

## 2021-11-03 ASSESSMENT — MIFFLIN-ST. JEOR: SCORE: 1943.36

## 2021-11-03 NOTE — PATIENT INSTRUCTIONS
- I will send you a message on [facility] when I am able to look at the results of your tests from today

## 2021-11-03 NOTE — PROGRESS NOTES
SUBJECTIVE:   CC: Simone Johnson is an 46 year old male who presents for preventative health visit.     Patient has been advised of split billing requirements and indicates understanding: Yes     Healthy Habits:     Getting at least 3 servings of Calcium per day:  Yes    Bi-annual eye exam:  NO    Dental care twice a year:  Yes    Sleep apnea or symptoms of sleep apnea:  Sleep apnea    Diet:  Regular (no restrictions)    Frequency of exercise:  2-3 days/week    Duration of exercise:  15-30 minutes    Taking medications regularly:  Yes    Medication side effects:  None    PHQ-2 Total Score: 0    Additional concerns today:  Yes    Simone presents today for a physical exam. We also discussed that his CPAP has been recalled. He hasn't been to a sleep medicine provider in many years. Wondering about next steps.    Today's PHQ-2 Score:   PHQ-2 ( 1999 [employer]) 10/27/2021   Q1: Little interest or pleasure in doing things 0   Q2: Feeling down, depressed or hopeless 0   PHQ-2 Score 0   Q1: Little interest or pleasure in doing things Not at all   Q2: Feeling down, depressed or hopeless Not at all   PHQ-2 Score 0     Abuse: Current or Past(Physical, Sexual or Emotional)- No  Do you feel safe in your environment? Yes    Social History     Tobacco Use     Smoking status: Never Smoker     Smokeless tobacco: Never Used   Substance Use Topics     Alcohol use: Yes     Alcohol/week: 3.0 standard drinks     Types: 3 Standard drinks or equivalent per week     Comment: couple beers per week     If you drink alcohol do you typically have >3 drinks per day or >7 drinks per week? No    Alcohol Use 11/3/2021   Prescreen: >3 drinks/day or >7 drinks/week? -   Prescreen: >3 drinks/day or >7 drinks/week? No     Reviewed orders with patient. Reviewed health maintenance and updated orders accordingly - Yes    Labs reviewed in EPIC    Reviewed and updated as needed this visit by clinical staff  Tobacco  Allergies  Meds  Problems  Med Hx   "Surg Hx  Fam Hx  Soc Hx        Reviewed and updated as needed this visit by Provider  Tobacco  Allergies  Meds  Problems  Med Hx  Surg Hx  Fam Hx           Review of Systems   Constitutional: Negative for chills and fever.   HENT: Negative for congestion, ear pain, hearing loss and sore throat.    Eyes: Negative for pain and visual disturbance.   Respiratory: Negative for cough and shortness of breath.    Cardiovascular: Negative for chest pain, palpitations and peripheral edema.   Gastrointestinal: Negative for abdominal pain, constipation, diarrhea, heartburn, hematochezia and nausea.   Genitourinary: Negative for discharge, dysuria, frequency, genital sores, hematuria, impotence and urgency.   Musculoskeletal: Negative for arthralgias, joint swelling and myalgias.   Skin: Negative for rash.   Neurological: Negative for dizziness, weakness, headaches and paresthesias.   Psychiatric/Behavioral: Negative for mood changes. The patient is not nervous/anxious.      OBJECTIVE:   /72   Pulse 57   Temp 97.3  F (36.3  C) (Tympanic)   Ht 1.791 m (5' 10.5\")   Wt 104.9 kg (231 lb 4.8 oz)   SpO2 98%   BMI 32.72 kg/m      Physical Exam  GENERAL: In no distress.  EYES: Conjunctivae/corneas clear. EOMs grossly intact. PERRL.  HENT: NC/AT, facies symmetric. Neck supple. No LAD or thyromegaly noted.  RESP: CTAB. No w/r/r.  CV: RRR, no m/r/g.  GI: NT, ND, without rebound or guarding, no CVA tenderness, no hepatomegaly appreciated.  MSK: Moves all four extremities freely.  SKIN: No significant ulcers, lesions or rashes on the visualized portions of the skin  NEURO: Alert. Oriented.  PSYCH: Linear thought process. Speech normal rate and volume. No tangential thoughts, hallucinations, or delusions.    Diagnostic Test Results: Labs reviewed in Epic    ASSESSMENT/PLAN:   Routine general medical examination at a health care facility  Reviewed PMH. Discussed healthcare maintenance issues, including cancer screenings " "(we plans to discuss with his insurance company to see if they cover colon cancer screening tests between the ages of 45-49), relevant immunizations, and cardiac risk factor screenings such as for cholesterol, HTN, and DM.    Pulmonary nodule  0.4 cm nodule incidentally seen on CT a/p in Dec 2020. Discussed today. He is low risk. Per rads report, no further follow-up needed if low risk. I did offer a repeat CT chest today but he declined which is definitely reasonable.    CHACHA (obstructive sleep apnea)  He has filled out relevant recall paperwork. Referral placed for additional CHACHA cares.  - SLEEP EVALUATION & MANAGEMENT REFERRAL - ADULT -; Future    History of GI bleed  Normocytic anemia  Plugged into [facility]. Reports PPI has greatly improved his symptoms. Last CBC showed anemia but that was right after his GIB. Will repeat today. Defer additional cares to his GI team.  - CBC with platelets; Future    Encounter for screening for lipid disorder  - Lipid Profile; Future    Encounter for screening for diabetes mellitus  - Basic metabolic panel; Future    Encounter for immunization  - INFLUENZA VACCINE IM > 6 MONTHS VALENT IIV4 (AFLURIA/FLUZONE)    Patient has been advised of split billing requirements and indicates understanding: Yes     COUNSELING:   Special attention given to:        Regular exercise       Healthy diet/nutrition       Immunizations    Vaccinated for: Influenza         Colon cancer screening       Prostate cancer screening    Estimated body mass index is 32.72 kg/m  as calculated from the following:    Height as of this encounter: 1.791 m (5' 10.5\").    Weight as of this encounter: 104.9 kg (231 lb 4.8 oz).     He reports that he has never smoked. He has never used smokeless tobacco.      Keegan Finley MD  [facility]  "

## 2021-11-04 LAB
ANION GAP SERPL CALCULATED.3IONS-SCNC: 3 MMOL/L (ref 3–14)
BUN SERPL-MCNC: 10 MG/DL (ref 7–30)
CALCIUM SERPL-MCNC: 8.1 MG/DL (ref 8.5–10.1)
CHLORIDE BLD-SCNC: 106 MMOL/L (ref 94–109)
CHOLEST SERPL-MCNC: 192 MG/DL
CO2 SERPL-SCNC: 28 MMOL/L (ref 20–32)
CREAT SERPL-MCNC: 1.04 MG/DL (ref 0.66–1.25)
FASTING STATUS PATIENT QL REPORTED: NO
GFR SERPL CREATININE-BSD FRML MDRD: 86 ML/MIN/1.73M2
GLUCOSE BLD-MCNC: 85 MG/DL (ref 70–99)
HDLC SERPL-MCNC: 43 MG/DL
LDLC SERPL CALC-MCNC: 112 MG/DL
NONHDLC SERPL-MCNC: 149 MG/DL
POTASSIUM BLD-SCNC: 3.7 MMOL/L (ref 3.4–5.3)
SODIUM SERPL-SCNC: 137 MMOL/L (ref 133–144)
TRIGL SERPL-MCNC: 187 MG/DL

## 2021-11-18 ENCOUNTER — TRANSFERRED RECORDS (OUTPATIENT)
Dept: HEALTH INFORMATION MANAGEMENT | Facility: CLINIC | Age: 46
End: 2021-11-18
Payer: COMMERCIAL

## 2022-02-15 ENCOUNTER — TRANSFERRED RECORDS (OUTPATIENT)
Dept: HEALTH INFORMATION MANAGEMENT | Facility: CLINIC | Age: 47
End: 2022-02-15
Payer: COMMERCIAL

## 2022-03-21 ENCOUNTER — APPOINTMENT (OUTPATIENT)
Dept: CT IMAGING | Facility: CLINIC | Age: 47
End: 2022-03-21
Attending: EMERGENCY MEDICINE
Payer: COMMERCIAL

## 2022-03-21 ENCOUNTER — HOSPITAL ENCOUNTER (EMERGENCY)
Facility: CLINIC | Age: 47
Discharge: HOME | End: 2022-03-22
Attending: EMERGENCY MEDICINE | Admitting: EMERGENCY MEDICINE
Payer: COMMERCIAL

## 2022-03-21 DIAGNOSIS — Z87.19 HISTORY OF GI BLEED: ICD-10-CM

## 2022-03-21 DIAGNOSIS — R19.7 VOMITING AND DIARRHEA: ICD-10-CM

## 2022-03-21 DIAGNOSIS — R11.10 VOMITING AND DIARRHEA: ICD-10-CM

## 2022-03-21 DIAGNOSIS — K92.0 HEMATEMESIS, PRESENCE OF NAUSEA NOT SPECIFIED: ICD-10-CM

## 2022-03-21 LAB
ABO/RH(D): NORMAL
ALBUMIN SERPL-MCNC: 5 G/DL (ref 3.4–5)
ALP SERPL-CCNC: 75 U/L (ref 40–150)
ALT SERPL W P-5'-P-CCNC: 30 U/L (ref 0–70)
ANION GAP SERPL CALCULATED.3IONS-SCNC: 11 MMOL/L (ref 3–14)
ANTIBODY SCREEN: NEGATIVE
AST SERPL W P-5'-P-CCNC: 16 U/L (ref 0–45)
BASOPHILS # BLD AUTO: 0.1 10E3/UL (ref 0–0.2)
BASOPHILS NFR BLD AUTO: 1 %
BILIRUB DIRECT SERPL-MCNC: 0.2 MG/DL (ref 0–0.2)
BILIRUB SERPL-MCNC: 1 MG/DL (ref 0.2–1.3)
BUN SERPL-MCNC: 32 MG/DL (ref 7–30)
CALCIUM SERPL-MCNC: 9.8 MG/DL (ref 8.5–10.1)
CHLORIDE BLD-SCNC: 95 MMOL/L (ref 94–109)
CO2 SERPL-SCNC: 28 MMOL/L (ref 20–32)
CREAT SERPL-MCNC: 1.65 MG/DL (ref 0.66–1.25)
EOSINOPHIL # BLD AUTO: 0 10E3/UL (ref 0–0.7)
EOSINOPHIL NFR BLD AUTO: 0 %
ERYTHROCYTE [DISTWIDTH] IN BLOOD BY AUTOMATED COUNT: 12.8 % (ref 10–15)
GFR SERPL CREATININE-BSD FRML MDRD: 51 ML/MIN/1.73M2
GLUCOSE BLD-MCNC: 150 MG/DL (ref 70–99)
HCT VFR BLD AUTO: 58.6 % (ref 40–53)
HGB BLD-MCNC: 19.8 G/DL (ref 13.3–17.7)
IMM GRANULOCYTES # BLD: 0.1 10E3/UL
IMM GRANULOCYTES NFR BLD: 0 %
LIPASE SERPL-CCNC: 78 U/L (ref 73–393)
LYMPHOCYTES # BLD AUTO: 1.2 10E3/UL (ref 0.8–5.3)
LYMPHOCYTES NFR BLD AUTO: 9 %
MCH RBC QN AUTO: 31.6 PG (ref 26.5–33)
MCHC RBC AUTO-ENTMCNC: 33.8 G/DL (ref 31.5–36.5)
MCV RBC AUTO: 94 FL (ref 78–100)
MONOCYTES # BLD AUTO: 0.9 10E3/UL (ref 0–1.3)
MONOCYTES NFR BLD AUTO: 6 %
NEUTROPHILS # BLD AUTO: 11.6 10E3/UL (ref 1.6–8.3)
NEUTROPHILS NFR BLD AUTO: 84 %
NRBC # BLD AUTO: 0 10E3/UL
NRBC BLD AUTO-RTO: 0 /100
PLATELET # BLD AUTO: 373 10E3/UL (ref 150–450)
POTASSIUM BLD-SCNC: 3.9 MMOL/L (ref 3.4–5.3)
PROT SERPL-MCNC: 9.2 G/DL (ref 6.8–8.8)
RBC # BLD AUTO: 6.26 10E6/UL (ref 4.4–5.9)
SODIUM SERPL-SCNC: 134 MMOL/L (ref 133–144)
SPECIMEN EXPIRATION DATE: NORMAL
WBC # BLD AUTO: 13.7 10E3/UL (ref 4–11)

## 2022-03-21 PROCEDURE — 85025 COMPLETE CBC W/AUTO DIFF WBC: CPT | Performed by: EMERGENCY MEDICINE

## 2022-03-21 PROCEDURE — 99285 EMERGENCY DEPT VISIT HI MDM: CPT | Mod: 25

## 2022-03-21 PROCEDURE — 250N000011 HC RX IP 250 OP 636: Performed by: EMERGENCY MEDICINE

## 2022-03-21 PROCEDURE — 80053 COMPREHEN METABOLIC PANEL: CPT | Performed by: EMERGENCY MEDICINE

## 2022-03-21 PROCEDURE — 250N000013 HC RX MED GY IP 250 OP 250 PS 637: Performed by: EMERGENCY MEDICINE

## 2022-03-21 PROCEDURE — 74177 CT ABD & PELVIS W/CONTRAST: CPT

## 2022-03-21 PROCEDURE — 83690 ASSAY OF LIPASE: CPT | Performed by: EMERGENCY MEDICINE

## 2022-03-21 PROCEDURE — C9113 INJ PANTOPRAZOLE SODIUM, VIA: HCPCS | Performed by: EMERGENCY MEDICINE

## 2022-03-21 PROCEDURE — 258N000003 HC RX IP 258 OP 636: Performed by: EMERGENCY MEDICINE

## 2022-03-21 PROCEDURE — 96374 THER/PROPH/DIAG INJ IV PUSH: CPT | Mod: 59

## 2022-03-21 PROCEDURE — 250N000009 HC RX 250: Performed by: EMERGENCY MEDICINE

## 2022-03-21 PROCEDURE — 82248 BILIRUBIN DIRECT: CPT | Performed by: EMERGENCY MEDICINE

## 2022-03-21 PROCEDURE — 96375 TX/PRO/DX INJ NEW DRUG ADDON: CPT

## 2022-03-21 PROCEDURE — 36415 COLL VENOUS BLD VENIPUNCTURE: CPT | Performed by: EMERGENCY MEDICINE

## 2022-03-21 PROCEDURE — 96361 HYDRATE IV INFUSION ADD-ON: CPT

## 2022-03-21 PROCEDURE — 86850 RBC ANTIBODY SCREEN: CPT | Performed by: EMERGENCY MEDICINE

## 2022-03-21 RX ORDER — IOPAMIDOL 755 MG/ML
111 INJECTION, SOLUTION INTRAVASCULAR ONCE
Status: COMPLETED | OUTPATIENT
Start: 2022-03-21 | End: 2022-03-21

## 2022-03-21 RX ORDER — ONDANSETRON 2 MG/ML
4 INJECTION INTRAMUSCULAR; INTRAVENOUS ONCE
Status: COMPLETED | OUTPATIENT
Start: 2022-03-21 | End: 2022-03-21

## 2022-03-21 RX ADMIN — LIDOCAINE HYDROCHLORIDE 30 ML: 20 SOLUTION ORAL; TOPICAL at 23:43

## 2022-03-21 RX ADMIN — SODIUM CHLORIDE 72 ML: 900 INJECTION INTRAVENOUS at 23:45

## 2022-03-21 RX ADMIN — ONDANSETRON 4 MG: 2 INJECTION INTRAMUSCULAR; INTRAVENOUS at 22:41

## 2022-03-21 RX ADMIN — PANTOPRAZOLE SODIUM 40 MG: 40 INJECTION, POWDER, FOR SOLUTION INTRAVENOUS at 23:43

## 2022-03-21 RX ADMIN — IOPAMIDOL 111 ML: 755 INJECTION, SOLUTION INTRAVENOUS at 23:45

## 2022-03-21 RX ADMIN — SODIUM CHLORIDE 1000 ML: 9 INJECTION, SOLUTION INTRAVENOUS at 22:41

## 2022-03-22 VITALS
RESPIRATION RATE: 18 BRPM | TEMPERATURE: 99.7 F | SYSTOLIC BLOOD PRESSURE: 124 MMHG | WEIGHT: 220 LBS | DIASTOLIC BLOOD PRESSURE: 88 MMHG | OXYGEN SATURATION: 96 % | HEART RATE: 92 BPM | BODY MASS INDEX: 31.5 KG/M2 | HEIGHT: 70 IN

## 2022-03-22 LAB — HOLD SPECIMEN: NORMAL

## 2022-03-22 PROCEDURE — 258N000003 HC RX IP 258 OP 636: Performed by: EMERGENCY MEDICINE

## 2022-03-22 RX ORDER — ONDANSETRON 4 MG/1
4 TABLET, ORALLY DISINTEGRATING ORAL EVERY 6 HOURS PRN
Qty: 20 TABLET | Refills: 0 | Status: SHIPPED | OUTPATIENT
Start: 2022-03-22 | End: 2022-12-12

## 2022-03-22 RX ADMIN — SODIUM CHLORIDE 1000 ML: 9 INJECTION, SOLUTION INTRAVENOUS at 00:40

## 2022-03-22 NOTE — ED PROVIDER NOTES
"  History   Chief Complaint:  Abdominal Pain    The history is provided by the patient.   History supplemented by electronic chart review     Simone Johnson is a 47 year old male with history of duodenal ulcer who presents with emesis, diarrhea, and abdominal pain persisting since onset yesterday morning. The patient reports initial emesis at that time, which he describes as \"mucousy\" and ongoing throughout the day. This morning, he reports new non-bloody diarrhea along with onset of abdominal pain, additionally describing new hematemesis later this afternoon. Notably, he has history of gastrointestinal hemorrhage associated with duodenal ulcer in December, 2020 (approximately 15 months ago), for which he was admitted to hospital and treated nonoperatively before later discharge to home. The patient describes his current symptoms as somewhat similar to his past ulcer, particularly noting that \"this is how it started last time, but I didn't go in then until a couple of days later when it got worse and I was admitted.\" Concern over this similarity to his current symptoms prompted his presentation to the ED at this time. At present, the patient estimates a total of 15 episodes of emesis and states his overall symptoms have improved. He is not anticoagulated and denies regular or recent use of anti-inflammatory medication. He has taken prescribed Protonix but denies use of this today because of the vomiting.  No alcohol use.  Has seen Dr. Cadet with GI, who did EGD in 1/2021 showing advanced esophageal stenosis as well as erythema of the gastric mucosa.    CT A/P 12/2020:  IMPRESSION:   1.  There is thickening of the second portion of the duodenum, with  mild associated inflammatory stranding, as well as an outpouching of  fluid through the duodenal wall in this region posteriorly.  Differential considerations include a contained perforation related to  a duodenal ulcer, duodenitis, or duodenal diverticulitis. " "Endoscopy  may be helpful for further evaluation.  2.  Few mildly prominent and enlarged lymph nodes in the right upper  quadrant are nonspecific, but may be reactive and related to the  duodenal inflammation.  3.  Indeterminate pulmonary nodule in the lingula measures 0.4 cm.  Please refer to pulmonary nodule follow-up guidelines below.        Review of Systems   All other systems reviewed and are negative.    Allergies:  Amoxicillin    Medications:  Pantoprazole    Past Medical History:     Pulmonary nodules  CHACHA  Duodenal ulcer    Past Surgical History:    Hernia repair  Vasectomy    Family History:    Heart disease    Social History:  The patient presented alone.  The patient arrived in a private vehicle.    Physical Exam     Patient Vitals for the past 24 hrs:   BP Temp Temp src Pulse Resp SpO2 Height Weight   03/22/22 0045 124/88 -- -- -- -- 96 % -- --   03/22/22 0015 128/85 -- -- 92 -- 95 % -- --   03/21/22 2250 -- -- -- -- -- 97 % -- --   03/21/22 1946 (!) 143/85 99.7  F (37.6  C) Oral 100 18 96 % 1.778 m (5' 10\") 99.8 kg (220 lb)     Physical Exam  General: Male sitting upright in room 25  HENT: mucous membranes slightly dry  CV: rate as above, no LE edema  Resp: normal effort, speaks in full phrases, no stridor, no cough observed  GI: Abdomen soft, mild tenderness in epigastrium with negative Luz sign, slightly protuberant, no guarding, no discrete masses palpable  MSK: no bony tenderness, no CVAT  Skin: appropriately warm and dry, no abdominal rash, no petechiae  Neuro: alert, clear speech, oriented   Psych: cooperative, pleasant    Emergency Department Course     Imaging:  CT Abdomen Pelvis w Contrast   Final Result   IMPRESSION: No cause of acute pain identified in the abdomen or pelvis. Normal appendix.         Report per radiology    Laboratory:  Labs Ordered and Resulted from Time of ED Arrival to Time of ED Departure   BASIC METABOLIC PANEL - Abnormal       Result Value    Sodium 134      " Potassium 3.9      Chloride 95      Carbon Dioxide (CO2) 28      Anion Gap 11      Urea Nitrogen 32 (*)     Creatinine 1.65 (*)     Calcium 9.8      Glucose 150 (*)     GFR Estimate 51 (*)    HEPATIC FUNCTION PANEL - Abnormal    Bilirubin Total 1.0      Bilirubin Direct 0.2      Protein Total 9.2 (*)     Albumin 5.0      Alkaline Phosphatase 75      AST 16      ALT 30     CBC WITH PLATELETS AND DIFFERENTIAL - Abnormal    WBC Count 13.7 (*)     RBC Count 6.26 (*)     Hemoglobin 19.8 (*)     Hematocrit 58.6 (*)     MCV 94      MCH 31.6      MCHC 33.8      RDW 12.8      Platelet Count 373      % Neutrophils 84      % Lymphocytes 9      % Monocytes 6      % Eosinophils 0      % Basophils 1      % Immature Granulocytes 0      NRBCs per 100 WBC 0      Absolute Neutrophils 11.6 (*)     Absolute Lymphocytes 1.2      Absolute Monocytes 0.9      Absolute Eosinophils 0.0      Absolute Basophils 0.1      Absolute Immature Granulocytes 0.1      Absolute NRBCs 0.0     LIPASE - Normal    Lipase 78     TYPE AND SCREEN, ADULT    ABO/RH(D) O POS      Antibody Screen Negative      SPECIMEN EXPIRATION DATE [id number]     ABO/RH TYPE AND SCREEN     Emergency Department Course:     Reviewed:  I reviewed nursing notes, vitals, past medical history and Care Everywhere.    Assessments:  2325 I obtained history and examined the patient as noted above.   0023 I rechecked the patient and explained findings.   I rechecked the patient and explained findings. I believe that they are safe for discharge at this time.    Interventions:  2241 NS 1 L IV  2241 Zofran 4 mg IV  2343 Protonix 40 mg IV  2343 Xylocaine and Maalox 30 mL PO  0040 NS 1 L IV    Disposition:  The patient was discharged to home.     Impression & Plan   Medical Decision Making:  Differential diagnosis includes viral etiologies, peptic ulcer disease, esophagitis, bowel obstruction, perforated viscus pancreatitis, hepatobiliary disease many others, with note made of his prior  abnormal CT possibly reflecting viscus perforation, as well as previous upper endoscopy.  He has had no history of variceal bleed or upper GI cancers and is not on blood thinners.  He has not had black or red stool.  We discussed the limitations of an emergency department assessment to localize a source of his recent bleeding.  Marika-Brown tear remains on the differential.  He felt improved with treatments provided and is tolerating oral intake.  He has not had recurrent hematemesis or even emesis during the emergency department course, such that the patient and I agreed upon a plan for discharge and close outpatient follow-up with his primary clinic as well as Dr. Cadet with gastroenterology.  He was advised to continue his antacid medication and I have prescribed Zofran for additional relief.  If symptoms persist upper endoscopy may need to be repeated though I do not think this is indicated emergently nor that he requires hospitalization at this time.      Diagnosis:    ICD-10-CM    1. Vomiting and diarrhea  R11.10     R19.7    2. Hematemesis, presence of nausea not specified  K92.0    3. History of GI bleed  Z87.19      Discharge Medications:  New Prescriptions    ONDANSETRON (ZOFRAN ODT) 4 MG ODT TAB    Take 1 tablet (4 mg) by mouth every 6 hours as needed for nausea     Scribe Disclosure:  I, Kacey Belle, am serving as a scribe at 10:37 PM on 3/21/2022 to document services personally performed by Justin Cai MD based on my observations and the provider's statements to me.     This note was completed in part using Dragon voice recognition software. Although reviewed after completion, some word and grammatical errors may occur.       Justin Cai MD  03/22/22 0214

## 2022-03-22 NOTE — ED TRIAGE NOTES
Pt walk in to ed with c/o two days of emesis and diarrhea with blood in vomit. Pt hx of bleeding ulcer with hospital admission for same. Pt takes daily protonix and abstains from coffee and etoh due to sx.

## 2022-03-23 ENCOUNTER — TRANSFERRED RECORDS (OUTPATIENT)
Dept: HEALTH INFORMATION MANAGEMENT | Facility: CLINIC | Age: 47
End: 2022-03-23
Payer: COMMERCIAL

## 2022-03-30 ENCOUNTER — TRANSFERRED RECORDS (OUTPATIENT)
Dept: HEALTH INFORMATION MANAGEMENT | Facility: CLINIC | Age: 47
End: 2022-03-30

## 2022-03-30 PROCEDURE — 88305 TISSUE EXAM BY PATHOLOGIST: CPT | Mod: TC,ORL | Performed by: INTERNAL MEDICINE

## 2022-03-30 PROCEDURE — 88305 TISSUE EXAM BY PATHOLOGIST: CPT | Mod: 26 | Performed by: PATHOLOGY

## 2022-03-31 ENCOUNTER — LAB REQUISITION (OUTPATIENT)
Dept: LAB | Facility: CLINIC | Age: 47
End: 2022-03-31
Payer: COMMERCIAL

## 2022-03-31 ENCOUNTER — LAB (OUTPATIENT)
Dept: LAB | Facility: CLINIC | Age: 47
End: 2022-03-31
Payer: COMMERCIAL

## 2022-03-31 DIAGNOSIS — K22.2 ESOPHAGEAL OBSTRUCTION: ICD-10-CM

## 2022-03-31 DIAGNOSIS — K30 FUNCTIONAL DYSPEPSIA: ICD-10-CM

## 2022-03-31 DIAGNOSIS — K44.9 DIAPHRAGMATIC HERNIA WITHOUT OBSTRUCTION OR GANGRENE: ICD-10-CM

## 2022-03-31 DIAGNOSIS — K52.9 NONINFECTIVE GASTROENTERITIS AND COLITIS, UNSPECIFIED: ICD-10-CM

## 2022-03-31 DIAGNOSIS — K26.9 DUODENAL ULCER, UNSPECIFIED AS ACUTE OR CHRONIC, WITHOUT HEMORRHAGE OR PERFORATION: ICD-10-CM

## 2022-03-31 DIAGNOSIS — K52.9 NONINFECTIOUS GASTROENTERITIS, UNSPECIFIED TYPE: Primary | ICD-10-CM

## 2022-03-31 DIAGNOSIS — K52.9 NON-SPECIFIC COLITIS: ICD-10-CM

## 2022-03-31 DIAGNOSIS — K31.89 OTHER DISEASES OF STOMACH AND DUODENUM: ICD-10-CM

## 2022-03-31 DIAGNOSIS — K21.00 GASTRO-ESOPHAGEAL REFLUX DISEASE WITH ESOPHAGITIS, WITHOUT BLEEDING: ICD-10-CM

## 2022-03-31 DIAGNOSIS — R11.2 NAUSEA WITH VOMITING, UNSPECIFIED: ICD-10-CM

## 2022-03-31 DIAGNOSIS — K22.10 ULCER OF ESOPHAGUS WITHOUT BLEEDING: ICD-10-CM

## 2022-03-31 DIAGNOSIS — R10.13 EPIGASTRIC PAIN: ICD-10-CM

## 2022-03-31 LAB
ALBUMIN SERPL-MCNC: 4.7 G/DL (ref 3.4–5)
ALP SERPL-CCNC: 70 U/L (ref 40–150)
ALT SERPL W P-5'-P-CCNC: 24 U/L (ref 0–70)
ANION GAP SERPL CALCULATED.3IONS-SCNC: 11 MMOL/L (ref 3–14)
AST SERPL W P-5'-P-CCNC: 15 U/L (ref 0–45)
BILIRUB SERPL-MCNC: 0.9 MG/DL (ref 0.2–1.3)
BUN SERPL-MCNC: 28 MG/DL (ref 7–30)
CALCIUM SERPL-MCNC: 9.3 MG/DL (ref 8.5–10.1)
CHLORIDE BLD-SCNC: 92 MMOL/L (ref 94–109)
CO2 SERPL-SCNC: 24 MMOL/L (ref 20–32)
CREAT SERPL-MCNC: 3.15 MG/DL (ref 0.66–1.25)
CRP SERPL-MCNC: <2.9 MG/L (ref 0–8)
ERYTHROCYTE [DISTWIDTH] IN BLOOD BY AUTOMATED COUNT: 12.6 % (ref 10–15)
GFR SERPL CREATININE-BSD FRML MDRD: 24 ML/MIN/1.73M2
GLUCOSE BLD-MCNC: 128 MG/DL (ref 70–99)
HCT VFR BLD AUTO: 57.5 % (ref 40–53)
HGB BLD-MCNC: 19.9 G/DL (ref 13.3–17.7)
MCH RBC QN AUTO: 31.6 PG (ref 26.5–33)
MCHC RBC AUTO-ENTMCNC: 34.6 G/DL (ref 31.5–36.5)
MCV RBC AUTO: 91 FL (ref 78–100)
PLATELET # BLD AUTO: 447 10E3/UL (ref 150–450)
POTASSIUM BLD-SCNC: 3.4 MMOL/L (ref 3.4–5.3)
PROT SERPL-MCNC: 8.9 G/DL (ref 6.8–8.8)
RBC # BLD AUTO: 6.29 10E6/UL (ref 4.4–5.9)
SODIUM SERPL-SCNC: 127 MMOL/L (ref 133–144)
WBC # BLD AUTO: 10.9 10E3/UL (ref 4–11)

## 2022-03-31 PROCEDURE — 82784 ASSAY IGA/IGD/IGG/IGM EACH: CPT

## 2022-03-31 PROCEDURE — 86231 EMA EACH IG CLASS: CPT

## 2022-03-31 PROCEDURE — 36415 COLL VENOUS BLD VENIPUNCTURE: CPT

## 2022-03-31 PROCEDURE — 80053 COMPREHEN METABOLIC PANEL: CPT

## 2022-03-31 PROCEDURE — 85027 COMPLETE CBC AUTOMATED: CPT

## 2022-03-31 PROCEDURE — 82040 ASSAY OF SERUM ALBUMIN: CPT

## 2022-03-31 PROCEDURE — 83516 IMMUNOASSAY NONANTIBODY: CPT

## 2022-03-31 PROCEDURE — 86140 C-REACTIVE PROTEIN: CPT

## 2022-04-01 LAB
GLIADIN IGA SER-ACNC: 0.7 U/ML
GLIADIN IGG SER-ACNC: <0.6 U/ML
IGA SERPL-MCNC: 178 MG/DL (ref 84–499)
TTG IGA SER-ACNC: 0.3 U/ML
TTG IGG SER-ACNC: 0.7 U/ML

## 2022-04-02 LAB — ENDOMYSIUM IGA TITR SER IF: NORMAL {TITER}

## 2022-04-04 LAB
PATH REPORT.COMMENTS IMP SPEC: NORMAL
PATH REPORT.COMMENTS IMP SPEC: NORMAL
PATH REPORT.FINAL DX SPEC: NORMAL
PATH REPORT.GROSS SPEC: NORMAL
PATH REPORT.MICROSCOPIC SPEC OTHER STN: NORMAL
PATH REPORT.RELEVANT HX SPEC: NORMAL
PHOTO IMAGE: NORMAL

## 2022-06-03 ENCOUNTER — TRANSFERRED RECORDS (OUTPATIENT)
Dept: HEALTH INFORMATION MANAGEMENT | Facility: CLINIC | Age: 47
End: 2022-06-03

## 2022-06-03 ENCOUNTER — TRANSFERRED RECORDS (OUTPATIENT)
Dept: LAB | Facility: CLINIC | Age: 47
End: 2022-06-03

## 2022-12-05 ASSESSMENT — SLEEP AND FATIGUE QUESTIONNAIRES
HOW LIKELY ARE YOU TO NOD OFF OR FALL ASLEEP IN A CAR, WHILE STOPPED FOR A FEW MINUTES IN TRAFFIC: WOULD NEVER DOZE
HOW LIKELY ARE YOU TO NOD OFF OR FALL ASLEEP WHEN YOU ARE A PASSENGER IN A CAR FOR AN HOUR WITHOUT A BREAK: WOULD NEVER DOZE
HOW LIKELY ARE YOU TO NOD OFF OR FALL ASLEEP WHILE SITTING QUIETLY AFTER LUNCH WITHOUT ALCOHOL: WOULD NEVER DOZE
HOW LIKELY ARE YOU TO NOD OFF OR FALL ASLEEP WHILE SITTING AND READING: SLIGHT CHANCE OF DOZING
HOW LIKELY ARE YOU TO NOD OFF OR FALL ASLEEP WHILE WATCHING TV: MODERATE CHANCE OF DOZING
HOW LIKELY ARE YOU TO NOD OFF OR FALL ASLEEP WHILE SITTING INACTIVE IN A PUBLIC PLACE: WOULD NEVER DOZE
HOW LIKELY ARE YOU TO NOD OFF OR FALL ASLEEP WHILE LYING DOWN TO REST IN THE AFTERNOON WHEN CIRCUMSTANCES PERMIT: SLIGHT CHANCE OF DOZING
HOW LIKELY ARE YOU TO NOD OFF OR FALL ASLEEP WHILE SITTING AND TALKING TO SOMEONE: WOULD NEVER DOZE

## 2022-12-12 ENCOUNTER — OFFICE VISIT (OUTPATIENT)
Dept: SLEEP MEDICINE | Facility: CLINIC | Age: 47
End: 2022-12-12
Payer: COMMERCIAL

## 2022-12-12 VITALS
WEIGHT: 233 LBS | HEIGHT: 70 IN | DIASTOLIC BLOOD PRESSURE: 80 MMHG | SYSTOLIC BLOOD PRESSURE: 125 MMHG | OXYGEN SATURATION: 96 % | HEART RATE: 66 BPM | BODY MASS INDEX: 33.36 KG/M2

## 2022-12-12 DIAGNOSIS — G47.33 OSA (OBSTRUCTIVE SLEEP APNEA): Primary | ICD-10-CM

## 2022-12-12 PROCEDURE — 99203 OFFICE O/P NEW LOW 30 MIN: CPT | Performed by: INTERNAL MEDICINE

## 2022-12-12 RX ORDER — DICYCLOMINE HCL 20 MG
1 TABLET ORAL 3 TIMES DAILY
COMMUNITY
Start: 2022-05-16

## 2022-12-12 NOTE — PROGRESS NOTES
Sleep Consultation:    Date on this visit: 12/12/2022    Simone Johnson  is referred by No ref. provider found for a sleep consultation.     Primary Physician: Keegan Batres     Simone Johnson presents to clinic for management of obstructive sleep apnea.     He was diagnosed with moderate obstructive sleep apnea in 2010.  PSG at that time showed an apnea-hypopnea index of 25/h and RDI of 36/h.  Lowest oxygen saturation was 79%.  CPAP titration was effective between 6 to 8 cm H2O.    Patient has been regularly using CPAP since then.  He has responded well to treatment with adequate resolution of his symptoms.    Overall, he rates the experience with PAP as good. The mask is comfortable. The mask is not leaking.  He is not snoring with the mask on. He is not having gasp arousals.  He is not having significant oral/nasal dryness. The pressure settings are comfortable.     He uses nasal pillows.     He does feel rested in the morning.    Respironics REMstar Plus  CPAP 6 cmH2O download:  30/30 total days of use. 0 nonuse days.  Average use 7 hours - minutes per day.  90% days with >4 hours use.    AHI data not reported.     Simone goes to sleep at 10:00 PM during the week. He wakes up at 6:00 AM. He falls asleep in 5 minutes.  Simone denies difficulty falling asleep.  He wakes up 2-3 times a night for 5 minutes before falling back to sleep.  Simone wakes up to go to the bathroom and uncertain reasons.  On weekends, Simone goes to sleep at 11:00 PM.  He wakes up at 7:00 AM. He falls asleep in 5 minutes.  Patient gets an average of 7-8 hours of sleep per night.      Patient sleeps on his back and side. Simone denies any sleep walking, dream enactment, sleep paralysis and hypnogogic/hypnopompic hallucinations.    Patient's Cushing Sleepiness score 4/24 consistent with no daytime sleepiness.      Simone naps 0 times per week. He takes no inadvertant naps.  He denies closing eyes, dozing and falling asleep  "while driving.  Patient was counseled on the importance of driving while alert, to pull over if drowsy, or nap before getting into the vehicle if sleepy.      He uses 0-1 sodas/day. Last caffeine intake is usually before noon.      Problem List:  Patient Active Problem List    Diagnosis Date Noted     CHACHA (obstructive sleep apnea) 11/03/2021     Priority: Medium     Pulmonary nodules 12/16/2020     Priority: Medium     CT scan in 12/2020 pulmonary nodule in the lingula measures 0.4 cm. Low risk. Discussed at Nov 2021 and opted NOT to proceed with additional screening/follow-up.          Past Medical/Surgical History:  No past medical history on file.  Past Surgical History:   Procedure Laterality Date     HERNIA REPAIR       Social History     Tobacco Use     Smoking status: Never     Smokeless tobacco: Never   Vaping Use     Vaping Use: Never used   Substance Use Topics     Alcohol use: Yes     Alcohol/week: 3.0 standard drinks     Types: 3 Standard drinks or equivalent per week     Comment: couple beers per week     Drug use: No       Physical Examination:  Vitals: /80   Pulse 66   Ht 1.778 m (5' 10\")   Wt 105.7 kg (233 lb)   SpO2 96%   BMI 33.43 kg/m    BMI= Body mass index is 33.43 kg/m .  GENERAL APPEARANCE: healthy, alert and no distress  HENT: oropharynx crowded  NEURO: mentation intact and speech normal  PSYCH: mentation appears normal and affect normal/bright  Mallampati Class: IV.  Tonsillar Stage: 1  hidden by pillars.    Impression/Plan:    1.  Obstructive sleep apnea    Patient has moderate obstructive sleep apnea at baseline with his PSG from 2010 showing an apnea-hypopnea index of 25/h.  He has been on successful CPAP therapy since that time.  Current treatment is with CPAP at a pressure of 6 cm H2O.  Regular compliance is demonstrated on CPAP download.  His device is not capable of reporting AHI information.  His device is part of the [employer] Respircastacl[employer]  recall and he is " educated regarding details of the recall.  He is in need of a replacement device.  In order to account for any change in pressure requirements, we can use auto titration settings on his replacement device.    Plan:     1.  Replacement CPAP device with auto titration pressure settings of 6 to 10 cm H2O      Obstructive sleep apnea reviewed.  Complications of untreated sleep apnea were reviewed.    I spent a total of 30 minutes for this appointment on this date of service which include time spent before, during and after the visit for chart review, patient care, counseling and coordination of care.    Dr. Alphonso Galindo       CC: No ref. provider found

## 2022-12-12 NOTE — NURSING NOTE
"Chief Complaint   Patient presents with     Sleep Problem     Re-establish care       Initial /80   Pulse 66   Ht 1.778 m (5' 10\")   Wt 105.7 kg (233 lb)   SpO2 96%   BMI 33.43 kg/m   Estimated body mass index is 33.43 kg/m  as calculated from the following:    Height as of this encounter: 1.778 m (5' 10\").    Weight as of this encounter: 105.7 kg (233 lb).    Medication Reconciliation: complete  ESS 4  Neck circumference: 45 centimeters.  Joyce Carmona MA  "

## 2022-12-15 ENCOUNTER — TELEPHONE (OUTPATIENT)
Dept: SLEEP MEDICINE | Facility: CLINIC | Age: 47
End: 2022-12-15

## 2022-12-16 NOTE — TELEPHONE ENCOUNTER
Please disregard previous documentation I called pt today 12/16/22 and LVM TO CALL AND GET SCHEDULED FOR REPLACEMENT CPAP SET UP.    Andrew Chase, Respiratory DME Coordinator

## 2022-12-26 ENCOUNTER — HEALTH MAINTENANCE LETTER (OUTPATIENT)
Age: 47
End: 2022-12-26

## 2023-01-02 ENCOUNTER — DOCUMENTATION ONLY (OUTPATIENT)
Dept: SLEEP MEDICINE | Facility: CLINIC | Age: 48
End: 2023-01-02
Payer: COMMERCIAL

## 2023-01-02 DIAGNOSIS — G47.33 OBSTRUCTIVE SLEEP APNEA (ADULT) (PEDIATRIC): Primary | ICD-10-CM

## 2023-01-02 NOTE — PROGRESS NOTES
Patient was offered choice of vendor and chose [facility].  Patient Simone Johnson was set up at [facility]  on January 2, 2023. Patient received a Resmed Airsense 11 Pressures were set at 6-10 cm H2O.   Patient s ramp is 5 cm H2O for Auto and FLEX/EPR is 2.  Patient received a Resmed Mask name: José FX  Pillow mask size Medium, heated tubing and heated humidifier.  Patient does not need to meet compliance.  Tia Krishna

## 2024-02-04 ENCOUNTER — HEALTH MAINTENANCE LETTER (OUTPATIENT)
Age: 49
End: 2024-02-04

## 2024-12-22 ENCOUNTER — HOSPITAL ENCOUNTER (EMERGENCY)
Facility: CLINIC | Age: 49
Discharge: HOME | End: 2024-12-22
Attending: EMERGENCY MEDICINE
Payer: COMMERCIAL

## 2024-12-22 VITALS
SYSTOLIC BLOOD PRESSURE: 145 MMHG | HEART RATE: 89 BPM | BODY MASS INDEX: 32.21 KG/M2 | DIASTOLIC BLOOD PRESSURE: 90 MMHG | RESPIRATION RATE: 16 BRPM | TEMPERATURE: 99.4 F | OXYGEN SATURATION: 96 % | WEIGHT: 225 LBS | HEIGHT: 70 IN

## 2024-12-22 DIAGNOSIS — R11.10 VOMITING AND DIARRHEA: ICD-10-CM

## 2024-12-22 DIAGNOSIS — K92.2 UPPER GI BLEED: ICD-10-CM

## 2024-12-22 DIAGNOSIS — R19.7 VOMITING AND DIARRHEA: ICD-10-CM

## 2024-12-22 LAB
ALBUMIN SERPL BCG-MCNC: 4 G/DL (ref 3.5–5.2)
ALP SERPL-CCNC: 46 U/L (ref 40–150)
ALT SERPL W P-5'-P-CCNC: 13 U/L (ref 0–70)
ANION GAP SERPL CALCULATED.3IONS-SCNC: 11 MMOL/L (ref 7–15)
AST SERPL W P-5'-P-CCNC: 14 U/L (ref 0–45)
BASOPHILS # BLD AUTO: 0.1 10E3/UL (ref 0–0.2)
BASOPHILS NFR BLD AUTO: 0 %
BILIRUB SERPL-MCNC: 0.6 MG/DL
BUN SERPL-MCNC: 22.5 MG/DL (ref 6–20)
CALCIUM SERPL-MCNC: 8 MG/DL (ref 8.8–10.4)
CHLORIDE SERPL-SCNC: 96 MMOL/L (ref 98–107)
CREAT SERPL-MCNC: 0.83 MG/DL (ref 0.67–1.17)
EGFRCR SERPLBLD CKD-EPI 2021: >90 ML/MIN/1.73M2
EOSINOPHIL # BLD AUTO: 0 10E3/UL (ref 0–0.7)
EOSINOPHIL NFR BLD AUTO: 0 %
ERYTHROCYTE [DISTWIDTH] IN BLOOD BY AUTOMATED COUNT: 12.8 % (ref 10–15)
GLUCOSE SERPL-MCNC: 112 MG/DL (ref 70–99)
HCO3 SERPL-SCNC: 26 MMOL/L (ref 22–29)
HCT VFR BLD AUTO: 40.9 % (ref 40–53)
HEMOCCULT STL QL: POSITIVE
HGB BLD-MCNC: 14.4 G/DL (ref 13.3–17.7)
IMM GRANULOCYTES # BLD: 0.1 10E3/UL
IMM GRANULOCYTES NFR BLD: 0 %
INR PPP: 1.1 (ref 0.85–1.15)
LYMPHOCYTES # BLD AUTO: 1.4 10E3/UL (ref 0.8–5.3)
LYMPHOCYTES NFR BLD AUTO: 11 %
MCH RBC QN AUTO: 31.4 PG (ref 26.5–33)
MCHC RBC AUTO-ENTMCNC: 35.2 G/DL (ref 31.5–36.5)
MCV RBC AUTO: 89 FL (ref 78–100)
MONOCYTES # BLD AUTO: 1.4 10E3/UL (ref 0–1.3)
MONOCYTES NFR BLD AUTO: 11 %
NEUTROPHILS # BLD AUTO: 10 10E3/UL (ref 1.6–8.3)
NEUTROPHILS NFR BLD AUTO: 78 %
NRBC # BLD AUTO: 0 10E3/UL
NRBC BLD AUTO-RTO: 0 /100
PLATELET # BLD AUTO: 265 10E3/UL (ref 150–450)
POTASSIUM SERPL-SCNC: 3.7 MMOL/L (ref 3.4–5.3)
PROT SERPL-MCNC: 6.6 G/DL (ref 6.4–8.3)
RBC # BLD AUTO: 4.58 10E6/UL (ref 4.4–5.9)
SODIUM SERPL-SCNC: 133 MMOL/L (ref 135–145)
WBC # BLD AUTO: 12.8 10E3/UL (ref 4–11)

## 2024-12-22 PROCEDURE — 250N000011 HC RX IP 250 OP 636: Performed by: EMERGENCY MEDICINE

## 2024-12-22 PROCEDURE — 85610 PROTHROMBIN TIME: CPT | Performed by: EMERGENCY MEDICINE

## 2024-12-22 PROCEDURE — 96361 HYDRATE IV INFUSION ADD-ON: CPT

## 2024-12-22 PROCEDURE — 36415 COLL VENOUS BLD VENIPUNCTURE: CPT | Performed by: EMERGENCY MEDICINE

## 2024-12-22 PROCEDURE — 258N000003 HC RX IP 258 OP 636: Performed by: EMERGENCY MEDICINE

## 2024-12-22 PROCEDURE — 80053 COMPREHEN METABOLIC PANEL: CPT | Performed by: EMERGENCY MEDICINE

## 2024-12-22 PROCEDURE — 85025 COMPLETE CBC W/AUTO DIFF WBC: CPT | Performed by: EMERGENCY MEDICINE

## 2024-12-22 PROCEDURE — 250N000013 HC RX MED GY IP 250 OP 250 PS 637: Performed by: EMERGENCY MEDICINE

## 2024-12-22 PROCEDURE — 99284 EMERGENCY DEPT VISIT MOD MDM: CPT | Mod: 25

## 2024-12-22 PROCEDURE — 82272 OCCULT BLD FECES 1-3 TESTS: CPT | Performed by: EMERGENCY MEDICINE

## 2024-12-22 PROCEDURE — 96374 THER/PROPH/DIAG INJ IV PUSH: CPT

## 2024-12-22 RX ORDER — ACETAMINOPHEN 500 MG
1000 TABLET ORAL ONCE
Status: COMPLETED | OUTPATIENT
Start: 2024-12-22 | End: 2024-12-22

## 2024-12-22 RX ORDER — ONDANSETRON 2 MG/ML
4 INJECTION INTRAMUSCULAR; INTRAVENOUS ONCE
Status: COMPLETED | OUTPATIENT
Start: 2024-12-22 | End: 2024-12-22

## 2024-12-22 RX ADMIN — ACETAMINOPHEN 1000 MG: 500 TABLET, FILM COATED ORAL at 11:37

## 2024-12-22 RX ADMIN — SODIUM CHLORIDE 1000 ML: 9 INJECTION, SOLUTION INTRAVENOUS at 11:44

## 2024-12-22 RX ADMIN — ONDANSETRON 4 MG: 2 INJECTION, SOLUTION INTRAMUSCULAR; INTRAVENOUS at 11:44

## 2024-12-22 ASSESSMENT — COLUMBIA-SUICIDE SEVERITY RATING SCALE - C-SSRS
1. IN THE PAST MONTH, HAVE YOU WISHED YOU WERE DEAD OR WISHED YOU COULD GO TO SLEEP AND NOT WAKE UP?: NO
6. HAVE YOU EVER DONE ANYTHING, STARTED TO DO ANYTHING, OR PREPARED TO DO ANYTHING TO END YOUR LIFE?: NO
2. HAVE YOU ACTUALLY HAD ANY THOUGHTS OF KILLING YOURSELF IN THE PAST MONTH?: NO

## 2024-12-22 ASSESSMENT — ACTIVITIES OF DAILY LIVING (ADL)
ADLS_ACUITY_SCORE: 50

## 2024-12-22 NOTE — ED PROVIDER NOTES
Emergency Department Note      History of Present Illness     Chief Complaint   Abdominal Pain and Melena      HPI   Simone Johnson is a 49 year old male presenting to the emergency department for evaluation of melena. The patient reports experiencing nausea, vomiting, diarrhea, and abdominal pain since the night of 12/17/24. He notes his abdominal pain is primarily in his lower abdomen, but notes epigastric pain with vomiting. He endorses noticing black, tarry stools last night, and he indicates a history of duodenal ulcers, for which he takes Protonix, and was thus told to come to the emergency department if he notices black or bloody stools. He reports experiencing chills accompanying his pain, as well as body aches. He denies any fevers or recent sick contacts. He notes he is unsure if there is any blood in his vomit as he has been drinking red Powerade. He denies any significant alcohol use or Ibuprofen use. He indicates that he was hospitalized in 2020 due to a perforated ulcer.       Independent Historian   None    Review of External Notes   No recent relevant notes.    I reviewed endoscopy from 6/3/22.  Patient had mild esophageal stenosis, but not esophageal, gastric or duodenal ulcers.    Past Medical History     Medical History and Problem List   Pulmonary nodules   CHACHA     Medications   Bentyl   Protonix   Zantac     Surgical History   Hernia repair     Physical Exam     Patient Vitals for the past 24 hrs:   BP Pulse SpO2   12/22/24 1410 (!) 145/90 89 96 %     Physical Exam  General: alert, lying comfortably on gurney  HENT: mucous membranes dry  CV: tachycardic rate, regular rhythm  Resp: normal effort, clear throughout, no crackles or wheezing  GI: abdomen soft and nontender, no guarding  Rectal: gross melena  MSK: no bony tenderness  Skin: appropriately warm and dry  Extremities: no edema, calves non-tender  Neuro: alert, clear speech, oriented  Psych: normal mood and affect      Diagnostics      Lab Results   Labs Ordered and Resulted from Time of ED Arrival to Time of ED Departure   COMPREHENSIVE METABOLIC PANEL - Abnormal       Result Value    Sodium 133 (*)     Potassium 3.7      Carbon Dioxide (CO2) 26      Anion Gap 11      Urea Nitrogen 22.5 (*)     Creatinine 0.83      GFR Estimate >90      Calcium 8.0 (*)     Chloride 96 (*)     Glucose 112 (*)     Alkaline Phosphatase 46      AST 14      ALT 13      Protein Total 6.6      Albumin 4.0      Bilirubin Total 0.6     OCCULT BLOOD STOOL - Abnormal    Occult Blood Positive (*)    CBC WITH PLATELETS AND DIFFERENTIAL - Abnormal    WBC Count 12.8 (*)     RBC Count 4.58      Hemoglobin 14.4      Hematocrit 40.9      MCV 89      MCH 31.4      MCHC 35.2      RDW 12.8      Platelet Count 265      % Neutrophils 78      % Lymphocytes 11      % Monocytes 11      % Eosinophils 0      % Basophils 0      % Immature Granulocytes 0      NRBCs per 100 WBC 0      Absolute Neutrophils 10.0 (*)     Absolute Lymphocytes 1.4      Absolute Monocytes 1.4 (*)     Absolute Eosinophils 0.0      Absolute Basophils 0.1      Absolute Immature Granulocytes 0.1      Absolute NRBCs 0.0     INR - Normal    INR 1.10         Imaging   No orders to display       EKG   None     Independent Interpretation   None    ED Course      Medications Administered   Medications   sodium chloride 0.9% BOLUS 1,000 mL (0 mLs Intravenous Stopped 12/22/24 1342)   acetaminophen (TYLENOL) tablet 1,000 mg (1,000 mg Oral $Given 12/22/24 1137)   ondansetron (ZOFRAN) injection 4 mg (4 mg Intravenous $Given 12/22/24 1144)       Procedures   Procedures     Discussion of Management   None    ED Course   ED Course as of 12/22/24 1410   Sun Dec 22, 2024   1126 I obtained history and examined the patient as noted above.    1409 I reevaluated the patient who is feeling much better.          Additional Documentation  None    Medical Decision Making / Diagnosis     CMS Diagnoses: None    MIPS       None    MDM    Simone Johnson is a 49 year old male with history of peptic ulcer disease and upper GI bleed, presenting today with vomiting, diarrhea, and now melena.  Vitals are reassuring, though the patient presented with tachycardia.  He also appears volume depleted, and tachycardia has resolved with IV fluids.  Blood work is reassuring, with normal hemoglobin.  However, the patient does have gross melena on exam and Hemoccult is positive.  This is consistent with recurrent upper GI bleed, possibly related to gastroenteritis and gastric irritation.  He does continue to take Protonix.  At this point, he has a strong preference to return home.  Given no hemodynamic instability, and no evidence for severe or brisk bleeding, I think this is reasonable.  I have recommended follow-up with Dr. Abarca in the next week, but return to the ER for continued melena, dark bloody stool, hematemesis, increased weakness or abdominal pain, syncope, or for other concerns.    Disposition   The patient was discharged.     Diagnosis     ICD-10-CM    1. Vomiting and diarrhea  R11.10     R19.7       2. Upper GI bleed  K92.2 Adult GI  Referral - Procedure Only           Discharge Medications   New Prescriptions    No medications on file         Scribe Disclosure:  Nate GUY, am serving as a scribe at 11:45 AM on 12/22/2024 to document services personally performed by Clarice Arguelles MD based on my observations and the provider's statements to me.        Clarice Arguelles MD  12/23/24 [id number]

## 2024-12-22 NOTE — DISCHARGE INSTRUCTIONS
Your Hemoccult test is positive, and I suspect you have a recurrent upper GI bleed involving either the stomach or the small intestine.  I recommend following up with Dr. Cadet in the next week regarding this.  Fortunately, your vitals are much improved after fluids.  Your hemoglobin level is normal which is reassuring.  Return to the ED if you develop any black or bloody vomit, persistent melena, increased abdominal pain, passing out, or increased weakness.  Continue to take your Protonix.  Continue to avoid ibuprofen.

## 2024-12-22 NOTE — ED TRIAGE NOTES
Patient complains of N/V/D and abdominal pain on Tuesday. Last night had black tarry stools. Has a history of ulcers. Complains of nausea and abdominal pain.

## 2025-03-02 ENCOUNTER — HEALTH MAINTENANCE LETTER (OUTPATIENT)
Age: 50
End: 2025-03-02

## 2025-07-06 ENCOUNTER — HOSPITAL ENCOUNTER (EMERGENCY)
Facility: CLINIC | Age: 50
Discharge: HOME | End: 2025-07-06
Attending: EMERGENCY MEDICINE | Admitting: EMERGENCY MEDICINE

## 2025-07-06 VITALS
HEART RATE: 58 BPM | RESPIRATION RATE: 16 BRPM | OXYGEN SATURATION: 98 % | WEIGHT: 215 LBS | BODY MASS INDEX: 30.78 KG/M2 | HEIGHT: 70 IN | TEMPERATURE: 98.6 F | DIASTOLIC BLOOD PRESSURE: 76 MMHG | SYSTOLIC BLOOD PRESSURE: 130 MMHG

## 2025-07-06 DIAGNOSIS — R11.10 VOMITING AND DIARRHEA: ICD-10-CM

## 2025-07-06 DIAGNOSIS — R19.7 VOMITING AND DIARRHEA: ICD-10-CM

## 2025-07-06 LAB
ALBUMIN SERPL BCG-MCNC: 4.6 G/DL (ref 3.5–5.2)
ALP SERPL-CCNC: 91 U/L (ref 40–150)
ALT SERPL W P-5'-P-CCNC: 21 U/L (ref 0–70)
ANION GAP SERPL CALCULATED.3IONS-SCNC: 13 MMOL/L (ref 7–15)
AST SERPL W P-5'-P-CCNC: 25 U/L (ref 0–45)
BASOPHILS # BLD AUTO: 0.1 10E3/UL (ref 0–0.2)
BASOPHILS NFR BLD AUTO: 1 %
BILIRUB SERPL-MCNC: 0.4 MG/DL
BUN SERPL-MCNC: 12.2 MG/DL (ref 6–20)
CALCIUM SERPL-MCNC: 9 MG/DL (ref 8.8–10.4)
CHLORIDE SERPL-SCNC: 102 MMOL/L (ref 98–107)
CREAT SERPL-MCNC: 1.11 MG/DL (ref 0.67–1.17)
EGFRCR SERPLBLD CKD-EPI 2021: 81 ML/MIN/1.73M2
EOSINOPHIL # BLD AUTO: 0.1 10E3/UL (ref 0–0.7)
EOSINOPHIL NFR BLD AUTO: 0 %
ERYTHROCYTE [DISTWIDTH] IN BLOOD BY AUTOMATED COUNT: 16.5 % (ref 10–15)
GLUCOSE SERPL-MCNC: 128 MG/DL (ref 70–99)
HCO3 SERPL-SCNC: 22 MMOL/L (ref 22–29)
HCT VFR BLD AUTO: 53 % (ref 40–53)
HGB BLD-MCNC: 17.5 G/DL (ref 13.3–17.7)
HOLD SPECIMEN: NORMAL
HOLD SPECIMEN: NORMAL
IMM GRANULOCYTES # BLD: 0 10E3/UL
IMM GRANULOCYTES NFR BLD: 0 %
LIPASE SERPL-CCNC: 374 U/L (ref 13–60)
LYMPHOCYTES # BLD AUTO: 0.8 10E3/UL (ref 0.8–5.3)
LYMPHOCYTES NFR BLD AUTO: 6 %
MCH RBC QN AUTO: 29.7 PG (ref 26.5–33)
MCHC RBC AUTO-ENTMCNC: 33 G/DL (ref 31.5–36.5)
MCV RBC AUTO: 90 FL (ref 78–100)
MONOCYTES # BLD AUTO: 0.6 10E3/UL (ref 0–1.3)
MONOCYTES NFR BLD AUTO: 4 %
NEUTROPHILS # BLD AUTO: 11.9 10E3/UL (ref 1.6–8.3)
NEUTROPHILS NFR BLD AUTO: 88 %
NRBC # BLD AUTO: 0 10E3/UL
NRBC BLD AUTO-RTO: 0 /100
PLATELET # BLD AUTO: 380 10E3/UL (ref 150–450)
POTASSIUM SERPL-SCNC: 4.3 MMOL/L (ref 3.4–5.3)
PROT SERPL-MCNC: 7.8 G/DL (ref 6.4–8.3)
RBC # BLD AUTO: 5.89 10E6/UL (ref 4.4–5.9)
SODIUM SERPL-SCNC: 137 MMOL/L (ref 135–145)
WBC # BLD AUTO: 13.5 10E3/UL (ref 4–11)

## 2025-07-06 PROCEDURE — 36415 COLL VENOUS BLD VENIPUNCTURE: CPT | Performed by: EMERGENCY MEDICINE

## 2025-07-06 PROCEDURE — 258N000003 HC RX IP 258 OP 636: Performed by: EMERGENCY MEDICINE

## 2025-07-06 PROCEDURE — 85025 COMPLETE CBC W/AUTO DIFF WBC: CPT | Performed by: EMERGENCY MEDICINE

## 2025-07-06 PROCEDURE — 96374 THER/PROPH/DIAG INJ IV PUSH: CPT

## 2025-07-06 PROCEDURE — 83690 ASSAY OF LIPASE: CPT | Performed by: EMERGENCY MEDICINE

## 2025-07-06 PROCEDURE — 96375 TX/PRO/DX INJ NEW DRUG ADDON: CPT

## 2025-07-06 PROCEDURE — 96361 HYDRATE IV INFUSION ADD-ON: CPT

## 2025-07-06 PROCEDURE — 82247 BILIRUBIN TOTAL: CPT | Performed by: EMERGENCY MEDICINE

## 2025-07-06 PROCEDURE — 99284 EMERGENCY DEPT VISIT MOD MDM: CPT | Mod: 25

## 2025-07-06 PROCEDURE — 250N000011 HC RX IP 250 OP 636: Performed by: EMERGENCY MEDICINE

## 2025-07-06 RX ORDER — ONDANSETRON 2 MG/ML
4 INJECTION INTRAMUSCULAR; INTRAVENOUS ONCE
Status: COMPLETED | OUTPATIENT
Start: 2025-07-06 | End: 2025-07-06

## 2025-07-06 RX ADMIN — SODIUM CHLORIDE 2000 ML: 0.9 INJECTION, SOLUTION INTRAVENOUS at 15:02

## 2025-07-06 RX ADMIN — FAMOTIDINE 20 MG: 10 INJECTION, SOLUTION INTRAVENOUS at 15:04

## 2025-07-06 RX ADMIN — ONDANSETRON 4 MG: 2 INJECTION, SOLUTION INTRAMUSCULAR; INTRAVENOUS at 15:03

## 2025-07-06 ASSESSMENT — ACTIVITIES OF DAILY LIVING (ADL)
ADLS_ACUITY_SCORE: 50
ADLS_ACUITY_SCORE: 50

## 2025-07-06 NOTE — ED PROVIDER NOTES
Emergency Department Note      History of Present Illness     Chief Complaint   Nausea, Vomiting, & Diarrhea      HPI   Simone Johnson is a 50 year old male  with a past medical history significant for ulcerative colitis who presents for evaluation of nausea, vomiting, and diarrhea. The patient reports that starting approximately 48 hours ago, he began having nausea and diarrhea. He has also had cramping in his upper abdomen. His symptoms have gotten progressively worse and he is not able to manage them with his usual medications. He usually takes a daily Protonix as prescribed. As needed dicyclomine and Zofran usually manage any nausea, vomiting, or diarrhea. Today, he took two Zofran and 2 dicyclomine and they did not provide any relief from his symptoms. He has not eaten today and has mostly been throwing up bile and fluids. He has vomited 4 times since last night. He also reports having lots of diarrhea, having approximately 15 bowel movements per day in the past 48 hours. He denies any bloody stools. He says he feels severely dehydrated. The patient reports that he went to [place] [employer] before his symptoms and had a beer which may have contributed to his symptoms. He also reports having popcorn with [employer] theater butter which he thinks might have made his symptoms worse. He denies having a fever at any point. He denies receiving any injections to manage is ulcerative colitis.     Independent Historian   None    Review of External Notes   Yes-reviewed his visit to this ER on December 22 for similar issues along with follow-up with his gastroenterologist on January 10 and February 21.    Past Medical History     Medical History and Problem List   Pulmonary nodules  Obstructive sleep apnea  Ulcerative colitis     Medications   Pantoprazole  Dicyclomine   Zofran    Surgical History   Hernia repair    Physical Exam     Patient Vitals for the past 24 hrs:   BP Temp Temp src Pulse Resp SpO2 Height Weight   07/06/25  "1638 130/76 -- -- 58 16 98 % -- --   07/06/25 1515 (!) 140/55 -- -- -- -- 95 % -- --   07/06/25 1425 138/86 98.6  F (37  C) Oral 58 17 100 % 1.778 m (5' 10\") 97.5 kg (215 lb)     Physical Exam  Eye:  Pupils are equal, round, and reactive.  Extraocular movements intact.    ENT:  No rhinorrhea.  Moist mucus membranes.  Normal tongue and tonsil.    Cardiac:  Regular rate and rhythm.  No murmurs, gallops, or rubs.    Pulmonary:  Clear to auscultation bilaterally.  No wheezes, rales, or rhonchi.    Abdomen:  Positive bowel sounds.  Abdomen is soft and non-distended, without focal tenderness. Mild tenderness in the epigastrium.     Musculoskeletal:  Normal movement of all extremities without evidence for deficit.    Skin:  Warm and dry without rashes.    Neurologic:  Non-focal exam without asymmetric weakness or numbness.     Psychiatric:  Normal affect with appropriate interaction with examiner.     Diagnostics     Lab Results   Labs Ordered and Resulted from Time of ED Arrival to Time of ED Departure   COMPREHENSIVE METABOLIC PANEL - Abnormal       Result Value    Sodium 137      Potassium 4.3      Carbon Dioxide (CO2) 22      Anion Gap 13      Urea Nitrogen 12.2      Creatinine 1.11      GFR Estimate 81      Calcium 9.0      Chloride 102      Glucose 128 (*)     Alkaline Phosphatase 91      AST 25      ALT 21      Protein Total 7.8      Albumin 4.6      Bilirubin Total 0.4     LIPASE - Abnormal    Lipase 374 (*)    CBC WITH PLATELETS AND DIFFERENTIAL - Abnormal    WBC Count 13.5 (*)     RBC Count 5.89      Hemoglobin 17.5      Hematocrit 53.0      MCV 90      MCH 29.7      MCHC 33.0      RDW 16.5 (*)     Platelet Count 380      % Neutrophils 88      % Lymphocytes 6      % Monocytes 4      % Eosinophils 0      % Basophils 1      % Immature Granulocytes 0      NRBCs per 100 WBC 0      Absolute Neutrophils 11.9 (*)     Absolute Lymphocytes 0.8      Absolute Monocytes 0.6      Absolute Eosinophils 0.1      Absolute " "Basophils 0.1      Absolute Immature Granulocytes 0.0      Absolute NRBCs 0.0         Imaging   No orders to display     Independent Interpretation   None    ED Course      Medications Administered   Medications   sodium chloride 0.9% BOLUS 2,000 mL (0 mLs Intravenous Stopped 7/6/25 1644)   ondansetron (ZOFRAN) injection 4 mg (4 mg Intravenous $Given 7/6/25 1503)   famotidine (PEPCID) injection 20 mg (20 mg Intravenous $Given 7/6/25 1504)       Procedures   Procedures     Discussion of Management   None    ED Course   ED Course as of 07/07/25 0844   Sun Jul 06, 2025   1454 I obtained history and examined the patient as noted above.    1632 I rechecked and updated the patient.    1636 We discussed plan for discharge and the patient is comfortable with this.        Additional Documentation  None    Medical Decision Making / Diagnosis     CMS Diagnoses: None    MIPS   None               MDM   Simone Johnson is a 50 year old male presenting to us with vomiting and diarrhea, intermittent over the past 3 to 4 days.  He states this is very similar to prior ulcerative colitis flares.  On my exam, his abdomen is soft and benign, certainly not surgical.  His vital signs are reassuring.  A laboratory investigation was pursued which shows a mild leukocytosis of unclear significance.  The remainder the labs are normal.  I reassessed him after he received a liter of fluid and IV Zofran.  He states that he is feeling \"completely improved\" and he desires to be discharged home.  We did discuss that he has a leukocytosis and that this can be a sign of more serious pathology.  I was clear with him that he needs to advance his diet slowly over the next 24 to 48 hours with no hesitation return to the ER if he is having return of his symptoms, fever, focal pain, or any other emergent concerns.    Disposition   The patient was discharged.     Diagnosis     ICD-10-CM    1. Vomiting and diarrhea  R11.10     R19.7            Discharge " Medications   Discharge Medication List as of 7/6/2025  4:45 PM            Scribe Disclosure:  I, Deidra Bernstein, am serving as a scribe at 2:55 PM on 7/6/2025 to document services personally performed by Trierweiler, Chad A, MD based on my observations and the provider's statements to me.        Trierweiler, Chad A, MD  07/07/25 0845

## 2025-07-06 NOTE — DISCHARGE INSTRUCTIONS
As discussed, the exact cause of your vomiting and diarrhea is unclear.  However, you are feeling improved after our interventions here.  Continue with Zofran and consider some Imodium for your diarrhea.  Follow closely with your gastroenterologist.  There should be no hesitation on your part to return to us if you are having increasing vomiting, severe pain, fevers, or any other emergent concerns.    Discharge Instructions  Gastroenteritis    You have been seen today for vomiting (throwing up) and diarrhea (loose stools), called gastroenteritis or the stomach flu. This is usually caused by a virus, but some bacteria, parasites, medicines or other medical conditions can cause similar symptoms. At this time your provider does not find that your vomiting and diarrhea is a sign of anything dangerous or life-threatening.  However, sometimes the signs of serious illness do not show up right away. Remember that serious problems like appendicitis can look like gastroenteritis at first.       Generally, every Emergency Department visit should have a follow-up clinic visit with either a primary or a specialty clinic/provider. Please follow-up as instructed by your emergency provider today.    Return to the Emergency Department if:  You keep vomiting and you are not able to keep liquids down.  You feel you are getting dehydrated, such as being very thirsty, not urinating (peeing), or feeling faint or lightheaded.   You develop a new fever.  You have abdominal (belly) pain that seems worse than cramps, is in one spot, or is getting worse over time.   You have blood in your vomit or in your diarrhea.  You feel very weak.    What can I do to help myself?  The most important thing to do is to drink clear liquids.  If you have been vomiting a lot, it is best to have only small, frequent sips of liquids.  Drinking too much at once may cause more vomiting. Water is a good first option for rehydration. If you are vomiting often, you  must also replace electrolytes (salts and minerals) lost with your illness. Pedialyte  is the best rehydration liquid but many don t like the taste so sports drinks (like Gatorade ) are a good option. Sodas and juice are also options but are high in sugar. Avoid acid liquids (orange), caffeine (coffee) or alcohol. Do not drink milk until you no longer have diarrhea.  After liquids are staying down, you may start eating mild foods. Soda crackers, toast, plain noodles, gelatin, applesauce and bananas are good first choices.  Avoid foods that have acid, are spicy, fatty or fibrous (such as meats, coarse grains, vegetables). You may start eating these foods again in about 3 days when you are better.  Sometimes treatment includes prescription medicine to prevent nausea (sick to your stomach) and vomiting and to prevent diarrhea. If your provider prescribes these for you, take them as directed.  Nonprescription medicine is available for the treatment of diarrhea and can be very effective.  If you use it, make sure you use the dose recommended on the package. Avoid Lomotil . Check with your healthcare provider before you use any medicine for diarrhea.  Do not take ibuprofen, or other nonsteroidal anti-inflammatory medicines without checking with your healthcare provider.  If you were given a prescription for medicine here today, be sure to read all of the information (including the package insert) that comes with your prescription.  This will include important information about the medicine, its side effects, and any warnings that you need to know about.  The pharmacist who fills the prescription can provide more information and answer questions you may have about the medicine.  If you have questions or concerns that the pharmacist cannot address, please call or return to the Emergency Department.   Remember that you can always come back to the Emergency Department if you are not able to see your regular provider in the  amount of time listed above, if you get any new symptoms, or if there is anything that worries you.

## 2025-07-06 NOTE — ED TRIAGE NOTES
Pt presents to ed to be evaluated for N/V/D  Pt has a hx of UC, and reports been having sx the past couple of days. Pt denies any blood in stool. Been taking zofran at home and not controlling his sx.      Triage Assessment (Adult)       Row Name 07/06/25 1428          Triage Assessment    Airway WDL WDL        Respiratory WDL    Respiratory WDL WDL

## 2025-07-08 ENCOUNTER — HOSPITAL ENCOUNTER (INPATIENT)
Facility: CLINIC | Age: 50
End: 2025-07-08
Attending: EMERGENCY MEDICINE | Admitting: HOSPITALIST
Payer: COMMERCIAL

## 2025-07-08 ENCOUNTER — APPOINTMENT (OUTPATIENT)
Dept: CT IMAGING | Facility: CLINIC | Age: 50
End: 2025-07-08
Payer: COMMERCIAL

## 2025-07-08 ENCOUNTER — APPOINTMENT (OUTPATIENT)
Dept: CT IMAGING | Facility: CLINIC | Age: 50
End: 2025-07-08
Attending: EMERGENCY MEDICINE
Payer: COMMERCIAL

## 2025-07-08 DIAGNOSIS — R10.30 LOWER ABDOMINAL PAIN: ICD-10-CM

## 2025-07-08 DIAGNOSIS — K63.1 PERFORATION OF INTESTINE (H): Primary | ICD-10-CM

## 2025-07-08 DIAGNOSIS — K52.9 ENTERITIS: ICD-10-CM

## 2025-07-08 DIAGNOSIS — K56.609 INTESTINAL OBSTRUCTION, UNSPECIFIED CAUSE, UNSPECIFIED WHETHER PARTIAL OR COMPLETE (H): ICD-10-CM

## 2025-07-08 DIAGNOSIS — E83.42 HYPOMAGNESEMIA: ICD-10-CM

## 2025-07-08 LAB
ALBUMIN SERPL BCG-MCNC: 4 G/DL (ref 3.5–5.2)
ALBUMIN UR-MCNC: 30 MG/DL
ALP SERPL-CCNC: 78 U/L (ref 40–150)
ALT SERPL W P-5'-P-CCNC: 14 U/L (ref 0–70)
ANION GAP SERPL CALCULATED.3IONS-SCNC: 13 MMOL/L (ref 7–15)
APPEARANCE UR: CLEAR
AST SERPL W P-5'-P-CCNC: 14 U/L (ref 0–45)
ATRIAL RATE - MUSE: 110 BPM
ATRIAL RATE - MUSE: 54 BPM
BASE EXCESS BLDV CALC-SCNC: -1.2 MMOL/L (ref -3–3)
BASOPHILS # BLD AUTO: 0.1 10E3/UL (ref 0–0.2)
BASOPHILS NFR BLD AUTO: 1 %
BILIRUB SERPL-MCNC: 0.6 MG/DL
BILIRUB UR QL STRIP: NEGATIVE
BUN SERPL-MCNC: 5.6 MG/DL (ref 6–20)
CA-I BLD-MCNC: 4.3 MG/DL (ref 4.4–5.2)
CALCIUM SERPL-MCNC: 8.6 MG/DL (ref 8.8–10.4)
CHLORIDE SERPL-SCNC: 98 MMOL/L (ref 98–107)
COLOR UR AUTO: YELLOW
CREAT SERPL-MCNC: 1.01 MG/DL (ref 0.67–1.17)
CRP SERPL-MCNC: <3 MG/L
DIASTOLIC BLOOD PRESSURE - MUSE: NORMAL MMHG
DIASTOLIC BLOOD PRESSURE - MUSE: NORMAL MMHG
EGFRCR SERPLBLD CKD-EPI 2021: >90 ML/MIN/1.73M2
EOSINOPHIL # BLD AUTO: 0.2 10E3/UL (ref 0–0.7)
EOSINOPHIL NFR BLD AUTO: 2 %
ERYTHROCYTE [DISTWIDTH] IN BLOOD BY AUTOMATED COUNT: 15.9 % (ref 10–15)
EST. AVERAGE GLUCOSE BLD GHB EST-MCNC: 117 MG/DL
GLUCOSE BLDC GLUCOMTR-MCNC: 140 MG/DL (ref 70–99)
GLUCOSE SERPL-MCNC: 135 MG/DL (ref 70–99)
GLUCOSE UR STRIP-MCNC: 70 MG/DL
HBA1C MFR BLD: 5.7 %
HCO3 BLDV-SCNC: 25 MMOL/L (ref 21–28)
HCO3 SERPL-SCNC: 25 MMOL/L (ref 22–29)
HCT VFR BLD AUTO: 47.2 % (ref 40–53)
HGB BLD-MCNC: 15.7 G/DL (ref 13.3–17.7)
HGB UR QL STRIP: ABNORMAL
HYALINE CASTS: 13 /LPF
IMM GRANULOCYTES # BLD: 0 10E3/UL
IMM GRANULOCYTES NFR BLD: 0 %
INTERPRETATION ECG - MUSE: NORMAL
INTERPRETATION ECG - MUSE: NORMAL
KETONES UR STRIP-MCNC: 10 MG/DL
LACTATE SERPL-SCNC: 1.4 MMOL/L (ref 0.7–2)
LACTATE SERPL-SCNC: 1.6 MMOL/L (ref 0.7–2)
LEUKOCYTE ESTERASE UR QL STRIP: NEGATIVE
LIPASE SERPL-CCNC: 259 U/L (ref 13–60)
LYMPHOCYTES # BLD AUTO: 2.4 10E3/UL (ref 0.8–5.3)
LYMPHOCYTES NFR BLD AUTO: 29 %
MAGNESIUM SERPL-MCNC: 1.8 MG/DL (ref 1.7–2.3)
MCH RBC QN AUTO: 29.3 PG (ref 26.5–33)
MCHC RBC AUTO-ENTMCNC: 33.3 G/DL (ref 31.5–36.5)
MCV RBC AUTO: 88 FL (ref 78–100)
MONOCYTES # BLD AUTO: 0.9 10E3/UL (ref 0–1.3)
MONOCYTES NFR BLD AUTO: 10 %
MUCOUS THREADS #/AREA URNS LPF: PRESENT /LPF
NEUTROPHILS # BLD AUTO: 4.8 10E3/UL (ref 1.6–8.3)
NEUTROPHILS NFR BLD AUTO: 58 %
NITRATE UR QL: NEGATIVE
NRBC # BLD AUTO: 0 10E3/UL
NRBC BLD AUTO-RTO: 0 /100
O2/TOTAL GAS SETTING VFR VENT: 3 %
OXYHGB MFR BLDV: 89 % (ref 70–75)
P AXIS - MUSE: 41 DEGREES
P AXIS - MUSE: 58 DEGREES
PCO2 BLDV: 43 MM HG (ref 40–50)
PH BLDV: 7.36 [PH] (ref 7.32–7.43)
PH UR STRIP: 5.5 [PH] (ref 5–7)
PLATELET # BLD AUTO: 341 10E3/UL (ref 150–450)
PO2 BLDV: 56 MM HG (ref 25–47)
POTASSIUM SERPL-SCNC: 3.6 MMOL/L (ref 3.4–5.3)
PR INTERVAL - MUSE: 140 MS
PR INTERVAL - MUSE: 174 MS
PROT SERPL-MCNC: 6.7 G/DL (ref 6.4–8.3)
QRS DURATION - MUSE: 76 MS
QRS DURATION - MUSE: 80 MS
QT - MUSE: 328 MS
QT - MUSE: 400 MS
QTC - MUSE: 379 MS
QTC - MUSE: 443 MS
R AXIS - MUSE: 25 DEGREES
R AXIS - MUSE: 29 DEGREES
RBC # BLD AUTO: 5.36 10E6/UL (ref 4.4–5.9)
RBC URINE: 1 /HPF
SAO2 % BLDV: 90.4 % (ref 70–75)
SODIUM SERPL-SCNC: 136 MMOL/L (ref 135–145)
SP GR UR STRIP: 1.02 (ref 1–1.03)
SYSTOLIC BLOOD PRESSURE - MUSE: NORMAL MMHG
SYSTOLIC BLOOD PRESSURE - MUSE: NORMAL MMHG
T AXIS - MUSE: 39 DEGREES
T AXIS - MUSE: 57 DEGREES
TROPONIN T SERPL HS-MCNC: <6 NG/L
TROPONIN T SERPL HS-MCNC: <6 NG/L
TSH SERPL DL<=0.005 MIU/L-ACNC: 2.55 UIU/ML (ref 0.3–4.2)
UROBILINOGEN UR STRIP-MCNC: NORMAL MG/DL
VENTRICULAR RATE- MUSE: 110 BPM
VENTRICULAR RATE- MUSE: 54 BPM
WBC # BLD AUTO: 8.4 10E3/UL (ref 4–11)
WBC URINE: 1 /HPF

## 2025-07-08 PROCEDURE — 250N000013 HC RX MED GY IP 250 OP 250 PS 637: Performed by: HOSPITALIST

## 2025-07-08 PROCEDURE — 96376 TX/PRO/DX INJ SAME DRUG ADON: CPT

## 2025-07-08 PROCEDURE — 86140 C-REACTIVE PROTEIN: CPT | Performed by: HOSPITALIST

## 2025-07-08 PROCEDURE — 250N000011 HC RX IP 250 OP 636

## 2025-07-08 PROCEDURE — 81001 URINALYSIS AUTO W/SCOPE: CPT | Performed by: EMERGENCY MEDICINE

## 2025-07-08 PROCEDURE — 258N000003 HC RX IP 258 OP 636: Performed by: EMERGENCY MEDICINE

## 2025-07-08 PROCEDURE — 250N000011 HC RX IP 250 OP 636: Performed by: EMERGENCY MEDICINE

## 2025-07-08 PROCEDURE — 84484 ASSAY OF TROPONIN QUANT: CPT | Performed by: HOSPITALIST

## 2025-07-08 PROCEDURE — 85014 HEMATOCRIT: CPT

## 2025-07-08 PROCEDURE — 83690 ASSAY OF LIPASE: CPT | Performed by: EMERGENCY MEDICINE

## 2025-07-08 PROCEDURE — 74177 CT ABD & PELVIS W/CONTRAST: CPT | Mod: XS

## 2025-07-08 PROCEDURE — 250N000011 HC RX IP 250 OP 636: Performed by: PHYSICIAN ASSISTANT

## 2025-07-08 PROCEDURE — 94660 CPAP INITIATION&MGMT: CPT

## 2025-07-08 PROCEDURE — 99291 CRITICAL CARE FIRST HOUR: CPT

## 2025-07-08 PROCEDURE — 84443 ASSAY THYROID STIM HORMONE: CPT | Performed by: HOSPITALIST

## 2025-07-08 PROCEDURE — 36415 COLL VENOUS BLD VENIPUNCTURE: CPT | Performed by: EMERGENCY MEDICINE

## 2025-07-08 PROCEDURE — 84484 ASSAY OF TROPONIN QUANT: CPT | Performed by: EMERGENCY MEDICINE

## 2025-07-08 PROCEDURE — 83605 ASSAY OF LACTIC ACID: CPT | Performed by: HOSPITALIST

## 2025-07-08 PROCEDURE — 999N000157 HC STATISTIC RCP TIME EA 10 MIN

## 2025-07-08 PROCEDURE — 250N000009 HC RX 250

## 2025-07-08 PROCEDURE — G0378 HOSPITAL OBSERVATION PER HR: HCPCS

## 2025-07-08 PROCEDURE — 84155 ASSAY OF PROTEIN SERUM: CPT

## 2025-07-08 PROCEDURE — 82805 BLOOD GASES W/O2 SATURATION: CPT

## 2025-07-08 PROCEDURE — 93005 ELECTROCARDIOGRAM TRACING: CPT

## 2025-07-08 PROCEDURE — 99285 EMERGENCY DEPT VISIT HI MDM: CPT | Mod: 25

## 2025-07-08 PROCEDURE — 36415 COLL VENOUS BLD VENIPUNCTURE: CPT

## 2025-07-08 PROCEDURE — 258N000003 HC RX IP 258 OP 636: Performed by: HOSPITALIST

## 2025-07-08 PROCEDURE — 250N000011 HC RX IP 250 OP 636: Performed by: HOSPITALIST

## 2025-07-08 PROCEDURE — 99223 1ST HOSP IP/OBS HIGH 75: CPT | Mod: AI | Performed by: HOSPITALIST

## 2025-07-08 PROCEDURE — 85025 COMPLETE CBC W/AUTO DIFF WBC: CPT | Performed by: EMERGENCY MEDICINE

## 2025-07-08 PROCEDURE — 71260 CT THORAX DX C+: CPT

## 2025-07-08 PROCEDURE — 83605 ASSAY OF LACTIC ACID: CPT

## 2025-07-08 PROCEDURE — 96361 HYDRATE IV INFUSION ADD-ON: CPT

## 2025-07-08 PROCEDURE — 96375 TX/PRO/DX INJ NEW DRUG ADDON: CPT

## 2025-07-08 PROCEDURE — 83036 HEMOGLOBIN GLYCOSYLATED A1C: CPT | Performed by: HOSPITALIST

## 2025-07-08 PROCEDURE — 250N000009 HC RX 250: Performed by: EMERGENCY MEDICINE

## 2025-07-08 PROCEDURE — 36415 COLL VENOUS BLD VENIPUNCTURE: CPT | Performed by: HOSPITALIST

## 2025-07-08 PROCEDURE — 82962 GLUCOSE BLOOD TEST: CPT

## 2025-07-08 PROCEDURE — 82330 ASSAY OF CALCIUM: CPT

## 2025-07-08 PROCEDURE — 93010 ELECTROCARDIOGRAM REPORT: CPT | Mod: XP | Performed by: INTERNAL MEDICINE

## 2025-07-08 PROCEDURE — 82310 ASSAY OF CALCIUM: CPT | Performed by: EMERGENCY MEDICINE

## 2025-07-08 PROCEDURE — 83735 ASSAY OF MAGNESIUM: CPT | Performed by: HOSPITALIST

## 2025-07-08 PROCEDURE — 96374 THER/PROPH/DIAG INJ IV PUSH: CPT | Mod: 59

## 2025-07-08 PROCEDURE — 99204 OFFICE O/P NEW MOD 45 MIN: CPT | Performed by: PHYSICIAN ASSISTANT

## 2025-07-08 RX ORDER — AMOXICILLIN 250 MG
2 CAPSULE ORAL 2 TIMES DAILY PRN
Status: DISCONTINUED | OUTPATIENT
Start: 2025-07-08 | End: 2025-07-09

## 2025-07-08 RX ORDER — NALOXONE HYDROCHLORIDE 0.4 MG/ML
0.4 INJECTION, SOLUTION INTRAMUSCULAR; INTRAVENOUS; SUBCUTANEOUS
Status: DISCONTINUED | OUTPATIENT
Start: 2025-07-08 | End: 2025-07-16 | Stop reason: HOSPADM

## 2025-07-08 RX ORDER — HYDROMORPHONE HYDROCHLORIDE 1 MG/ML
0.3 INJECTION, SOLUTION INTRAMUSCULAR; INTRAVENOUS; SUBCUTANEOUS
Status: DISCONTINUED | OUTPATIENT
Start: 2025-07-08 | End: 2025-07-08

## 2025-07-08 RX ORDER — ACETAMINOPHEN 650 MG/1
650 SUPPOSITORY RECTAL EVERY 4 HOURS PRN
Status: DISCONTINUED | OUTPATIENT
Start: 2025-07-08 | End: 2025-07-15

## 2025-07-08 RX ORDER — ACETAMINOPHEN 325 MG/1
650 TABLET ORAL EVERY 4 HOURS PRN
Status: DISCONTINUED | OUTPATIENT
Start: 2025-07-08 | End: 2025-07-09

## 2025-07-08 RX ORDER — ONDANSETRON 2 MG/ML
4 INJECTION INTRAMUSCULAR; INTRAVENOUS EVERY 6 HOURS PRN
Status: DISCONTINUED | OUTPATIENT
Start: 2025-07-08 | End: 2025-07-16 | Stop reason: HOSPADM

## 2025-07-08 RX ORDER — HYDROMORPHONE HYDROCHLORIDE 1 MG/ML
0.3 INJECTION, SOLUTION INTRAMUSCULAR; INTRAVENOUS; SUBCUTANEOUS
Status: DISCONTINUED | OUTPATIENT
Start: 2025-07-08 | End: 2025-07-09

## 2025-07-08 RX ORDER — NALOXONE HYDROCHLORIDE 0.4 MG/ML
0.2 INJECTION, SOLUTION INTRAMUSCULAR; INTRAVENOUS; SUBCUTANEOUS
Status: DISCONTINUED | OUTPATIENT
Start: 2025-07-08 | End: 2025-07-16 | Stop reason: HOSPADM

## 2025-07-08 RX ORDER — HYDROMORPHONE HYDROCHLORIDE 1 MG/ML
0.5 INJECTION, SOLUTION INTRAMUSCULAR; INTRAVENOUS; SUBCUTANEOUS
Status: DISCONTINUED | OUTPATIENT
Start: 2025-07-08 | End: 2025-07-08

## 2025-07-08 RX ORDER — PROCHLORPERAZINE MALEATE 10 MG
10 TABLET ORAL EVERY 6 HOURS PRN
Status: DISCONTINUED | OUTPATIENT
Start: 2025-07-08 | End: 2025-07-09

## 2025-07-08 RX ORDER — HYDROMORPHONE HYDROCHLORIDE 1 MG/ML
0.5 INJECTION, SOLUTION INTRAMUSCULAR; INTRAVENOUS; SUBCUTANEOUS EVERY 30 MIN PRN
Refills: 0 | Status: DISCONTINUED | OUTPATIENT
Start: 2025-07-08 | End: 2025-07-08

## 2025-07-08 RX ORDER — IOPAMIDOL 755 MG/ML
112 INJECTION, SOLUTION INTRAVASCULAR ONCE
Status: COMPLETED | OUTPATIENT
Start: 2025-07-09 | End: 2025-07-08

## 2025-07-08 RX ORDER — AMOXICILLIN 250 MG
1 CAPSULE ORAL 2 TIMES DAILY PRN
Status: DISCONTINUED | OUTPATIENT
Start: 2025-07-08 | End: 2025-07-09

## 2025-07-08 RX ORDER — ONDANSETRON 2 MG/ML
4 INJECTION INTRAMUSCULAR; INTRAVENOUS EVERY 30 MIN PRN
Status: DISCONTINUED | OUTPATIENT
Start: 2025-07-08 | End: 2025-07-08

## 2025-07-08 RX ORDER — OXYCODONE HYDROCHLORIDE 5 MG/1
5 TABLET ORAL EVERY 4 HOURS PRN
Refills: 0 | Status: DISCONTINUED | OUTPATIENT
Start: 2025-07-08 | End: 2025-07-09

## 2025-07-08 RX ORDER — OXYCODONE HYDROCHLORIDE 5 MG/1
5 TABLET ORAL EVERY 4 HOURS PRN
Refills: 0 | Status: DISCONTINUED | OUTPATIENT
Start: 2025-07-08 | End: 2025-07-08

## 2025-07-08 RX ORDER — ONDANSETRON 4 MG/1
4 TABLET, ORALLY DISINTEGRATING ORAL EVERY 8 HOURS PRN
COMMUNITY

## 2025-07-08 RX ORDER — HYDROMORPHONE HYDROCHLORIDE 1 MG/ML
0.5 INJECTION, SOLUTION INTRAMUSCULAR; INTRAVENOUS; SUBCUTANEOUS ONCE
Refills: 0 | Status: COMPLETED | OUTPATIENT
Start: 2025-07-08 | End: 2025-07-08

## 2025-07-08 RX ORDER — IOPAMIDOL 755 MG/ML
72 INJECTION, SOLUTION INTRAVASCULAR ONCE
Status: COMPLETED | OUTPATIENT
Start: 2025-07-08 | End: 2025-07-08

## 2025-07-08 RX ORDER — METHOCARBAMOL 500 MG/1
500 TABLET, FILM COATED ORAL 3 TIMES DAILY PRN
Status: DISCONTINUED | OUTPATIENT
Start: 2025-07-08 | End: 2025-07-08

## 2025-07-08 RX ORDER — HYDROMORPHONE HCL IN WATER/PF 6 MG/30 ML
0.2 PATIENT CONTROLLED ANALGESIA SYRINGE INTRAVENOUS
Status: DISCONTINUED | OUTPATIENT
Start: 2025-07-08 | End: 2025-07-08

## 2025-07-08 RX ORDER — HYDROXYZINE HYDROCHLORIDE 10 MG/1
10 TABLET, FILM COATED ORAL 3 TIMES DAILY PRN
Status: DISCONTINUED | OUTPATIENT
Start: 2025-07-08 | End: 2025-07-09

## 2025-07-08 RX ORDER — MORPHINE SULFATE 4 MG/ML
4 INJECTION, SOLUTION INTRAMUSCULAR; INTRAVENOUS
Status: COMPLETED | OUTPATIENT
Start: 2025-07-08 | End: 2025-07-08

## 2025-07-08 RX ORDER — SODIUM CHLORIDE 9 MG/ML
INJECTION, SOLUTION INTRAVENOUS CONTINUOUS
Status: ACTIVE | OUTPATIENT
Start: 2025-07-08 | End: 2025-07-08

## 2025-07-08 RX ORDER — ONDANSETRON 4 MG/1
4 TABLET, ORALLY DISINTEGRATING ORAL EVERY 6 HOURS PRN
Status: DISCONTINUED | OUTPATIENT
Start: 2025-07-08 | End: 2025-07-16 | Stop reason: HOSPADM

## 2025-07-08 RX ORDER — HYDRALAZINE HYDROCHLORIDE 20 MG/ML
10 INJECTION INTRAMUSCULAR; INTRAVENOUS EVERY 4 HOURS PRN
Status: DISCONTINUED | OUTPATIENT
Start: 2025-07-08 | End: 2025-07-16 | Stop reason: HOSPADM

## 2025-07-08 RX ORDER — KETOROLAC TROMETHAMINE 15 MG/ML
15 INJECTION, SOLUTION INTRAMUSCULAR; INTRAVENOUS ONCE
Status: DISCONTINUED | OUTPATIENT
Start: 2025-07-08 | End: 2025-07-08

## 2025-07-08 RX ORDER — HYDROMORPHONE HYDROCHLORIDE 1 MG/ML
0.5 INJECTION, SOLUTION INTRAMUSCULAR; INTRAVENOUS; SUBCUTANEOUS
Status: DISCONTINUED | OUTPATIENT
Start: 2025-07-08 | End: 2025-07-09

## 2025-07-08 RX ADMIN — SODIUM CHLORIDE 90 ML: 9 INJECTION, SOLUTION INTRAVENOUS at 05:55

## 2025-07-08 RX ADMIN — HYDROMORPHONE HYDROCHLORIDE 0.5 MG: 1 INJECTION, SOLUTION INTRAMUSCULAR; INTRAVENOUS; SUBCUTANEOUS at 06:05

## 2025-07-08 RX ADMIN — HYDROMORPHONE HYDROCHLORIDE 0.2 MG: 0.2 INJECTION, SOLUTION INTRAMUSCULAR; INTRAVENOUS; SUBCUTANEOUS at 16:18

## 2025-07-08 RX ADMIN — IOPAMIDOL 72 ML: 755 INJECTION, SOLUTION INTRAVENOUS at 05:55

## 2025-07-08 RX ADMIN — HYDROXYZINE HYDROCHLORIDE 10 MG: 10 TABLET, FILM COATED ORAL at 20:30

## 2025-07-08 RX ADMIN — SODIUM CHLORIDE, PRESERVATIVE FREE: 5 INJECTION INTRAVENOUS at 09:21

## 2025-07-08 RX ADMIN — HYDROXYZINE HYDROCHLORIDE 10 MG: 10 TABLET, FILM COATED ORAL at 13:01

## 2025-07-08 RX ADMIN — OXYCODONE HYDROCHLORIDE 5 MG: 5 TABLET ORAL at 17:39

## 2025-07-08 RX ADMIN — ACETAMINOPHEN 650 MG: 325 TABLET ORAL at 20:30

## 2025-07-08 RX ADMIN — IOPAMIDOL 112 ML: 755 INJECTION, SOLUTION INTRAVENOUS at 23:43

## 2025-07-08 RX ADMIN — METHOCARBAMOL 500 MG: 500 TABLET ORAL at 09:28

## 2025-07-08 RX ADMIN — HYDROMORPHONE HYDROCHLORIDE 0.2 MG: 0.2 INJECTION, SOLUTION INTRAMUSCULAR; INTRAVENOUS; SUBCUTANEOUS at 20:30

## 2025-07-08 RX ADMIN — OXYCODONE HYDROCHLORIDE 5 MG: 5 TABLET ORAL at 22:02

## 2025-07-08 RX ADMIN — SODIUM CHLORIDE 1000 ML: 0.9 INJECTION, SOLUTION INTRAVENOUS at 05:30

## 2025-07-08 RX ADMIN — SODIUM CHLORIDE 1000 ML: 0.9 INJECTION, SOLUTION INTRAVENOUS at 07:04

## 2025-07-08 RX ADMIN — PANTOPRAZOLE SODIUM 40 MG: 40 INJECTION, POWDER, FOR SOLUTION INTRAVENOUS at 07:04

## 2025-07-08 RX ADMIN — OXYCODONE HYDROCHLORIDE 5 MG: 5 TABLET ORAL at 13:22

## 2025-07-08 RX ADMIN — HYDROMORPHONE HYDROCHLORIDE 0.2 MG: 0.2 INJECTION, SOLUTION INTRAMUSCULAR; INTRAVENOUS; SUBCUTANEOUS at 09:20

## 2025-07-08 RX ADMIN — HYDROMORPHONE HYDROCHLORIDE 0.5 MG: 1 INJECTION, SOLUTION INTRAMUSCULAR; INTRAVENOUS; SUBCUTANEOUS at 05:31

## 2025-07-08 RX ADMIN — HYDROMORPHONE HYDROCHLORIDE 0.5 MG: 1 INJECTION, SOLUTION INTRAMUSCULAR; INTRAVENOUS; SUBCUTANEOUS at 23:31

## 2025-07-08 RX ADMIN — MORPHINE SULFATE 4 MG: 4 INJECTION, SOLUTION INTRAMUSCULAR; INTRAVENOUS at 07:04

## 2025-07-08 RX ADMIN — PANTOPRAZOLE SODIUM 40 MG: 40 INJECTION, POWDER, FOR SOLUTION INTRAVENOUS at 16:17

## 2025-07-08 RX ADMIN — HYDROMORPHONE HYDROCHLORIDE 0.5 MG: 1 INJECTION, SOLUTION INTRAMUSCULAR; INTRAVENOUS; SUBCUTANEOUS at 22:44

## 2025-07-08 RX ADMIN — SODIUM CHLORIDE 72 ML: 9 INJECTION, SOLUTION INTRAVENOUS at 23:43

## 2025-07-08 RX ADMIN — ONDANSETRON 4 MG: 2 INJECTION, SOLUTION INTRAMUSCULAR; INTRAVENOUS at 05:31

## 2025-07-08 RX ADMIN — HYDROXYZINE HYDROCHLORIDE 10 MG: 10 TABLET, FILM COATED ORAL at 17:39

## 2025-07-08 RX ADMIN — ACETAMINOPHEN 650 MG: 325 TABLET ORAL at 13:22

## 2025-07-08 ASSESSMENT — ACTIVITIES OF DAILY LIVING (ADL)
ADLS_ACUITY_SCORE: 50
ADLS_ACUITY_SCORE: 51
ADLS_ACUITY_SCORE: 51
ADLS_ACUITY_SCORE: 50
ADLS_ACUITY_SCORE: 51
ADLS_ACUITY_SCORE: 50

## 2025-07-08 ASSESSMENT — COLUMBIA-SUICIDE SEVERITY RATING SCALE - C-SSRS
1. IN THE PAST MONTH, HAVE YOU WISHED YOU WERE DEAD OR WISHED YOU COULD GO TO SLEEP AND NOT WAKE UP?: NO
2. HAVE YOU ACTUALLY HAD ANY THOUGHTS OF KILLING YOURSELF IN THE PAST MONTH?: NO
6. HAVE YOU EVER DONE ANYTHING, STARTED TO DO ANYTHING, OR PREPARED TO DO ANYTHING TO END YOUR LIFE?: NO

## 2025-07-08 NOTE — PROGRESS NOTES
Observation goals  PRIOR TO DISCHARGE     Not met    Comments: -diagnostic tests and consults completed and resulted: not met  -vital signs normal or at patient baseline:  met   -tolerating oral intake to maintain hydration:  not met. Pt stays NPO  -adequate pain control on oral analgesics: not met   Nurse to notify provider when observation goals have been met and patient is ready for discharge.

## 2025-07-08 NOTE — H&P
[facility]    History and Physical  Hospitalist    Simone Johnson MRN# [id number]   Age: 50 year old YOB: 1975     Date of Admission:  7/8/2025    Primary care provider: Kyle Cadet          Assessment and Plan:     Simone Johnson is a 50 year old  male with medical history of  history of ulcerative colitis (details not available in epic), Duodenal ulcer presented to ED with abdominal pain, vomiting, diarrhea.    Acute abdominal pain, vomiting, diarrhea.  --Reports diffuse abdominal pain, multiple episodes of loose watery diarrhea, vomiting nonbloody.    Reports little relief of pain with pantoprazole, dicyclomine, Zofran at home prompting ER evaluation.  -seen in ED on 7/6 for nausea, vomiting, diarrhea.  In ED WBC 13.5, electrolytes within normal limits, lipase 374.  Patient had received 1 L fluid bolus, IV Zofran with which symptoms improved, discharged home with close follow-up.    -Sodium 136, creatinine 1.01, potassium 3.6.  Liver enzymes within normal limits.  -WBC 8.4.  CRP less than 3.  Lipase 259.  -CT  consistent with a nonspecific proximal enteritis. fluid-filled loops of small bowel throughout the abdomen, borderline distended in the pelvis with gradual narrowing of distal small bowel caliber. Developing obstructive process not excluded.  --Admit to observation unit.  Send out stool for C. difficile, enteric pathogen panel.  Follow lactic acid levels.  Trend WBC count.  N.p.o. except ice chips  Fluids NS at 100 cc/h.  [facility] requested.  General surgery consult requested, early bowel obstruction ?  IV Protonix daily.  IV/p.o. as needed Zofran.  Tylenol as needed for mild pain, oxycodone as needed for moderate to severe pain, IV Dilaudid for pain not responding to oral narcotics.  Serial abdominal exam.  Follow urine analysis.    Left shoulder pain improving.  Elevated blood pressures without diagnosis of hypertension.  Sinus bradycardia.  -  "Complained of left shoulder pain, left lower chest discomfort.   Troponin high-sensitivity less than 6.  EKG sinus bradycardia, heart rate 54.  No acute ischemic changes.  CT aortic survey no aortic aneurysm or dissection.  Telemetry monitoring.  Trend troponin.  Follow TSH, magnesium level.  Monitor blood pressure closely.  As needed IV hydralazine ordered.    Hyperglycemia.  Follow hemoglobin A1c.  Monitor blood sugars in AM.    Obesity with a BMI of 31.98.  Increase all-cause mortality and morbidity.  Consider lifestyle modification with diet and exercise as able to.          Clinically Significant Risk Factors Present on Admission           # Hypocalcemia: Lowest Ca = 8.6 mg/dL in last 2 days, will monitor and replace as appropriate                   # Obesity: Estimated body mass index is 31.98 kg/m  as calculated from the following:    Height as of this encounter: 1.778 m (5' 10\").    Weight as of this encounter: 101.1 kg (222 lb 14.2 oz).              DVT Prophylaxis: Pneumatic Compression Devices  Code Status: Full Code    Disposition: Expected discharge in 1 to 2 days pending clinical improvement.    Medically Ready for Discharge: Anticipated Tomorrow     More than 70% of time spent in direct patient care, care coordination, patient counseling, and formalizing plan of care.   Discussed with patient and ED team.     Stevo Langley MD          Chief Complaint:     History is obtained from the patient, chart review.    Simone Johnson is a 50 year old  male with medical history of  history of ulcerative colitis (details not available in epic), Duodenal ulcer presented to ED with abdominal pain, vomiting, diarrhea.    Patient reports diffuse abdominal pain, worse in the lower abdominal area, cramping sensation ongoing for about 1 day..  Reports multiple episodes of loose watery diarrhea, about 15 episodes in the last 24 hours, no blood.  Reports 4 episodes of vomiting, loose watery, no blood.  Reports poor " oral intake.  Denies any urinary disturbance. Denies any associated fever.  Reports feeling warm. Reports typically pain improves with pantoprazole, dicyclomine and Zofran.  Reports little relief of symptoms with home medications.  Denies any recent weight loss.  Denies any new medications.  Denies any recent travel.  Denies any excess alcohol intake or smoking.    Reports left shoulder pain.    Denies any shortness of breath, no cough or wheezing.  Denies lifting heavy weights.  No new back pain.  No rash.  No new tingling or numbness.  Denies any headache or dizziness.  No loss of consciousness.  Reports generalized weakness over the last few days    Patient seen in ED on 7/6 for nausea, vomiting, diarrhea.  In ED WBC 13.5, electrolytes within normal limits, lipase 374.  Patient had received 1 L fluid bolus, IV Zofran with which symptoms improved, discharged home with close follow-up.      ED at the time of evaluation patient hemodynamically stable.  Blood pressure elevated.]  -Sodium 136, creatinine 1.01, potassium 3.6.  Liver enzymes within normal limits.  -WBC 8.4.  CRP less than 3.  Lipase 259.  -CT  consistent with a nonspecific proximal enteritis. fluid-filled loops of small bowel throughout the abdomen, borderline distended in the pelvis with gradual narrowing of distal small bowel caliber. Developing obstructive process not excluded.  Troponin high-sensitivity less than 6.  EKG sinus bradycardia, heart rate 54.  No acute ischemic changes.  CT aortic survey no aortic aneurysm or dissection.              Review of Systems:     GENERAL: No weight changes  EENT: No new vision changes, no sinus problems  PULMONARY: No shortness of breath  CARDIAC: no irregular or fast heart beats   GI: see HPI   : no urgency, no hematuria.   NEURO no dizziness, no loss of consciousness  ENDOCRINE: No excessive thirst  MUSCULOSKELETAL: No joint pain  SKIN: No skin rashes  PSYCHIATRY no anxiety     Medical History:     Past  Medical History:   Diagnosis Date    Ulcerative colitis (H)         Surgical History:      Past Surgical History:   Procedure Laterality Date    HERNIA REPAIR               Social History:      Social History     Tobacco Use    Smoking status: Never    Smokeless tobacco: Never   Substance Use Topics    Alcohol use: Yes     Alcohol/week: 3.0 standard drinks of alcohol     Types: 3 Standard drinks or equivalent per week     Comment: couple beers per week             Family History:   History reviewed. No pertinent family history.          Allergies:     Allergies   Allergen Reactions    Amoxicillin      Tolerates cephalosporins (ceftriaxone and cefdinir)             Medications:     Medications Prior to Admission   Medication Sig Dispense Refill Last Dose/Taking    dicyclomine (BENTYL) 20 MG tablet Take 1 tablet by mouth 3 times daily (Patient not taking: Reported on 12/12/2022)       pantoprazole (PROTONIX) 40 MG EC tablet Take 1 tablet (40 mg) by mouth daily Future refills by PCP [facility]. [facility] with phone number [email or web address]. 63 tablet 0              Physical Exam      Admission Weight: 97.5 kg (215 lb)    Vital Signs with Ranges  Temp:  [97.9  F (36.6  C)-98.3  F (36.8  C)] 98.3  F (36.8  C)  Pulse:  [] 108  Resp:  [17-36] 30  BP: (141-154)/() 154/101  SpO2:  [92 %-100 %] 92 %    PHYSICAL EXAM  GENERAL: Patient is in no distress. Alert and oriented.  HEENT: Oropharynx pink  HEART: Regular rate and rhythm. S1S2. No murmurs  LUNGS: Clear to auscultation bilaterally. No expiratory wheeze.  Respirations unlabored  ABDOMEN: Soft, diffuse abdominal tenderness, bowel sounds heard.  No guarding or rigidity.  NEURO: Moving all extremities.  EXTREMITIES: No pedal edema.   SKIN: Warm, dry. No rash   PSYCHIATRY Cooperative         Data:   All new lab and imaging data was reviewed.

## 2025-07-08 NOTE — PROGRESS NOTES
Event note     --Nursing pages for new shortness of breath, hypoxia to 88% on room air and requiring 3 L supplemental nasal oxygen with tachycardia and chest discomfort       Shortness of breath likely multifactorial secondary to pain, narcotic use, muscle relaxants, underlying CHACHA.  Report of shortness of breath, difficulty breathing, anxiety. Falls asleep in between encounter.  Afebrile, no wheezing.  No crackles.  Drowsy.  Repeat EKG showing sinus tachycardia.  Troponin high-sensitivity 6 < 6   CT aortic survey from ED-no central pulmonary embolism.  Patient had received 4 mg IV morphine, 2 doses of 0.5 mg IV Dilaudid this morning, another dose of 0.2 mg IV Dilaudid with 500 mg of Robaxin.   --Did discuss with patient, family by bedside, bedside nursing minimize IV narcotic use as able to.  Discontinue muscle relaxant.  CPAP with home settings.  Aggressive incentive spirometry.  Telemetry monitoring.  Closely monitor respiratory status.  Serial abdominal exam.   Await GI / Gen surgery input     Patient evaluated again this afternoon, plan of care discussed with patient/family/floor RN.  Total time greater than 15 minutes.

## 2025-07-08 NOTE — PROGRESS NOTES
MD Notification    Notified Person: MD    Notified Person Name: Shivam     Notification Date/Time: 07/08/25--11:58     Notification Interaction:  TigerText messaging     Purpose of Notification: Hello, pt appears very anxious and restless, still complaining shortness of breath and chest discomfort. 94% of 2-liter oxygen, intermitting N/V and he would like to get antianxiety/ higher IV dilaudid medication. please advise thanks.    Orders Received:    Comments:

## 2025-07-08 NOTE — CONSULTS
[facility]  Gastroenterology Consultation         Simone Johnson  [address] [facility] [id number]  50 year old male    Admission Date/Time: 7/8/2025  Primary Care Provider: Kyel Cadet  Referring / Attending Physician:  Dr. Stevo Langley    We were asked to see the patient in consultation by Dr. Stevo Langley for evaluation of abdominal pain .      CC: abdominal pain    HPI:  Simone Johnson is a 50 year old male who has history of perforated duodenal ulcer and ulcerative colitis on remission.   Has had multiple episodes of n/v/d and recently had over last week.  Seen in ED on 7/6 and given fluids and zofran and felt better discharged home. Today early in a.m. had sharp abdominal pain in RLQ associated with N/V. Has had multiple episodes of watery stool over lat week without blood. Pain primarily in LLQ but note has pain in epigastric area as well. Comes in waves of sharp pain. At home has managed symptoms with ondansetron and daily PPI and prn dicyclomine. Last colonoscopy per patient in 2022 and was normal. Has had no recent travel or sick exposure. Has minimal alcohol intake 1-2 a week and no cigarette use. Has had no fever or chills.     ROS: A comprehensive ten point review of systems was negative aside from those in mentioned in the HPI.      PAST MED HX:  I have reviewed this patient's medical history and updated it with pertinent information if needed.   Past Medical History:   Diagnosis Date    Ulcerative colitis (H)        MEDICATIONS:   Prior to Admission Medications   Prescriptions Last Dose Informant Patient Reported? Taking?   dicyclomine (BENTYL) 20 MG tablet   Yes Yes   Sig: Take 1 tablet by mouth 3 times daily as needed.   ondansetron (ZOFRAN ODT) 4 MG ODT tab   Yes Yes   Sig: Take 4 mg by mouth every 8 hours as needed.   pantoprazole (PROTONIX) 40 MG EC tablet 7/7/2025  No Yes   Sig: Take 1 tablet (40 mg) by mouth daily Future refills by  PCP Dr. [facility] with phone number None.      Facility-Administered Medications: None       ALLERGIES:   Allergies   Allergen Reactions    Amoxicillin      Tolerates cephalosporins (ceftriaxone and cefdinir)       SOCIAL HISTORY:  Social History     Tobacco Use    Smoking status: Never    Smokeless tobacco: Never   Vaping Use    Vaping status: Never Used   Substance Use Topics    Alcohol use: Yes     Alcohol/week: 3.0 standard drinks of alcohol     Types: 3 Standard drinks or equivalent per week     Comment: couple beers per week    Drug use: No       FAMILY HISTORY:  History reviewed. No pertinent family history.    PHYSICAL EXAM:   General  alert, oriented and uncomfortable  Vital Signs with Ranges  Temp: 98.2  F (36.8  C) Temp src: Oral BP: (!) 146/95 Pulse: 115   Resp: 23 SpO2: 96 % O2 Device: Nasal cannula Oxygen Delivery: 3 LPM  No intake/output data recorded.    Constitutional: Alert, oriented to person, place, date, situation.  Cooperative, lying in bed and very uncomfortable  Respiratory:  Lungs CTAB.  No crackles, wheezes, or rhonchi, no labored breathing.  Cardiovascular:  Heart RRR, no MRG, no edema.  GI:  Abdomen soft, tender in LLQ and epigastric area, mildly distended with hypooactive BS  Skin/Integumen:  Warm, dry, non-diaphoretic.  MSK: CMS x4 intact.        ADDITIONAL COMMENTS:   I reviewed the patient's new clinical lab test results.   Recent Labs   Lab Test 07/08/25  0530 07/06/25  1452 12/22/24  1143   WBC 8.4 13.5* 12.8*   HGB 15.7 17.5 14.4   MCV 88 90 89    380 265   INR  --   --  1.10     Recent Labs   Lab Test 07/08/25  0530 07/06/25  1452 12/22/24  1143   POTASSIUM 3.6 4.3 3.7   CHLORIDE 98 102 96*   CO2 25 22 26   BUN 5.6* 12.2 22.5*   ANIONGAP 13 13 11     Recent Labs   Lab Test 07/08/25  0530 07/06/25  1452 12/22/24  1143 03/31/22  1232 03/21/22  2241   ALBUMIN 4.0 4.6 4.0   < > 5.0   BILITOTAL 0.6 0.4 0.6   < > 1.0   ALT 14 21 13   < > 30   AST 14 25 14    < > 16   LIPASE 259* 374*  --   --  78    < > = values in this interval not displayed.       I reviewed the patient's new imaging results.        CONSULTATION ASSESSMENT AND PLAN:    Simone Johnson is a 50 year old  male with medical history of  history of ulcerative colitis (details not available in epic), Duodenal ulcer presented to ED with abdominal pain, vomiting, diarrhea.     Enteritis  Acute abdominal pain  Nausea, vomiting and diarrhea  H/o ulcerative colitis  Reports diffuse abdominal pain, multiple episodes of loose watery diarrhea, vomiting nonbloody.  Reports little relief of pain with pantoprazole, dicyclomine, Zofran at home prompting ER evaluation.  CT aortic survey noted nonspecific proximal enteritis, fluid filled loops of small bowel throughout abdomen and borderline distended pelvic- with gradual narrowing of distal small bowel caliber. Developing obstructive process not excluded. NO aortic aneurysm or dissection  Hgb 15.7, WBC 8.4, Lipase minimally elevated at 374>259., CRP <3  Findings concerning for C difficile vs PUD vs obstruction vs gastroenteritis vs IBD vs other    - stool for C. difficile, enteric pathogen panel.   - IVF, pain control, antiemetics  - BID PPI  - Await surgery consult  - consider EGD if pain continues and no explanation  - NPO except ice chips            [facility].  Office: [phone]  Cell : [phone] (Dr. Cadet)  Cell: [phone] (Kathy Cruz PA-C)

## 2025-07-08 NOTE — PHARMACY-ADMISSION MEDICATION HISTORY
Pharmacist Admission Medication History    Admission medication history is complete. The information provided in this note is only as accurate as the sources available at the time of the update.    Information Source(s): Patient and CareEverywhere/SureScripts via phone    Pertinent Information: None    Changes made to PTA medication list:  Added: Zofran  Deleted: None  Changed: Bentyl TID --> TID PRN    Allergies reviewed with patient and updates made in EHR: no    Medication History Completed By: Shreya Coulter RPH 7/8/2025 10:27 AM    PTA Med List   Medication Sig Last Dose/Taking    dicyclomine (BENTYL) 20 MG tablet Take 1 tablet by mouth 3 times daily as needed. Taking As Needed    ondansetron (ZOFRAN ODT) 4 MG ODT tab Take 4 mg by mouth every 8 hours as needed. Taking As Needed    pantoprazole (PROTONIX) 40 MG EC tablet Take 1 tablet (40 mg) by mouth daily Future refills by PCP Dr[facility] with phone number None. 7/7/2025

## 2025-07-08 NOTE — LETTER
July 16, 2025      To Whom It May Concern:      Simone Johnson is under my care and required emergent surgery for a bowel perforation. He will require 4 weeks of recovery and be unable to work. Surgery was 7/9/2025. Please call with any questions or concerns.     Sincerely,        Ammy Dang MD  [facility]  [phone]    Electronically signed

## 2025-07-08 NOTE — ED PROVIDER NOTES
"  Emergency Department Note      History of Present Illness     Chief Complaint   Flank Pain (L flank pain started 30 min ago,no HX kidney stones)      HPI   Simone Johnson is a 50 year old male with a history of GERD, who presents for evaluation of left flank pain. Patient reports he was dealing with vomiting/diarrhea the weekend of the 07/05/25, which worsened on 07/26/25 to nausea and vomiting. Today he states that he started experiencing lower abdominal pain which he has never experienced before. He notes that the pain radiates into his lower back. He states that he urinates just fine regularly. He denies experiencing hematochezia or chest pain. Patient reportds that he is on regular medication for his medical conditions.     Independent Historian   None    Review of External Notes   I reviewed patients ED visit from 07/06/25.    Past Medical History     Medical History and Problem List   GERD  CHACHA    Medications   Pantoprazole sodium    Surgical History   Hernia repair  Vasectomy    Physical Exam     Patient Vitals for the past 24 hrs:   BP Temp Temp src Pulse Resp SpO2 Height Weight   07/08/25 0700 (!) 152/105 -- -- 92 21 95 % -- --   07/08/25 0615 (!) 145/96 -- -- 67 17 93 % -- --   07/08/25 0535 -- -- -- 65 -- 99 % -- --   07/08/25 0522 (!) 142/92 97.9  F (36.6  C) Oral 112 19 100 % 1.778 m (5' 10\") 97.5 kg (215 lb)     Physical Exam  General: Sitting up in bed, appears uncomfortable  Eyes:  The pupils are equal and round    Conjunctivae and sclerae are normal  ENT:    Atraumatic face  Neck:  Normal range of motion  CV:  Regular rate, regular rhythm     Skin warm and well perfused   Resp:  Non labored breathing on room air    No tachypnea    No cough heard    Lungs clear bilaterally  GI:  Abdomen is soft, there is no rigidity    No distension    No rebound tenderness     Lower abdominal tenderness  MS:  Normal muscular tone  Skin:  No rash or acute skin lesions noted  Neuro:   Awake, alert.      Speech is " normal and fluent.    Face is symmetric.     Moves all extremities equally  Psych: Normal affect.  Appropriate interactions.     Diagnostics     Lab Results   Labs Ordered and Resulted from Time of ED Arrival to Time of ED Departure   COMPREHENSIVE METABOLIC PANEL - Abnormal       Result Value    Sodium 136      Potassium 3.6      Carbon Dioxide (CO2) 25      Anion Gap 13      Urea Nitrogen 5.6 (*)     Creatinine 1.01      GFR Estimate >90      Calcium 8.6 (*)     Chloride 98      Glucose 135 (*)     Alkaline Phosphatase 78      AST 14      ALT 14      Protein Total 6.7      Albumin 4.0      Bilirubin Total 0.6     LIPASE - Abnormal    Lipase 259 (*)    CBC WITH PLATELETS AND DIFFERENTIAL - Abnormal    WBC Count 8.4      RBC Count 5.36      Hemoglobin 15.7      Hematocrit 47.2      MCV 88      MCH 29.3      MCHC 33.3      RDW 15.9 (*)     Platelet Count 341      % Neutrophils 58      % Lymphocytes 29      % Monocytes 10      % Eosinophils 2      % Basophils 1      % Immature Granulocytes 0      NRBCs per 100 WBC 0      Absolute Neutrophils 4.8      Absolute Lymphocytes 2.4      Absolute Monocytes 0.9      Absolute Eosinophils 0.2      Absolute Basophils 0.1      Absolute Immature Granulocytes 0.0      Absolute NRBCs 0.0     TROPONIN T, HIGH SENSITIVITY - Normal    Troponin T, High Sensitivity <6     ROUTINE UA WITH MICROSCOPIC REFLEX TO CULTURE   CRP INFLAMMATION   HEMOGLOBIN A1C     Imaging   CT Aortic Survey w Contrast   Final Result   IMPRESSION:   1.  Findings consistent with a nonspecific proximal enteritis.      2.  Fluid-filled loops of small bowel throughout the abdomen, borderline distended in the pelvis with gradual narrowing of distal small bowel caliber. Developing obstructive process not excluded.      3.  No aortic aneurysm or dissection.           EKG   ECG results from 07/08/25   EKG 12-lead, tracing only     Value    Systolic Blood Pressure     Diastolic Blood Pressure     Ventricular Rate 54     Atrial Rate 54    IN Interval 174    QRS Duration 76        QTc 379    P Axis 58    R AXIS 29    T Axis 39    Interpretation ECG      Sinus bradycardia  Low voltage QRS  Borderline ECG  When compared with ECG of 16-Dec-2020 14:17,  Interpreted at 0540        Independent Interpretation   None    ED Course      Medications Administered   Medications   sodium chloride 0.9% BOLUS 1,000 mL (has no administration in time range)   ondansetron (ZOFRAN) injection 4 mg (has no administration in time range)     Procedures   Procedures     Discussion of Management   Admitting Hospitalist, Dr. Langley    ED Course   ED Course as of 07/08/25 0546   Tue Jul 08, 2025   0525 I evaluated the patient and obtained the history as noted above         Additional Documentation  None    Medical Decision Making / Diagnosis     MDM   Simone Johnson is a 50 year old male who presented to the ED with abdominal pain. Appears uncomfortable. CT done that shows findings of enteritis, developing bowel obstruction possible which I think would explain his pain. Requiring several dose of pain medications ,will admit to the hospital. No chest pain, EKG with no acute ischemic changes, troponin undetectable. Doubt ACS. Discussed with hospitalist for admission.    Disposition   The patient was admitted to the hospital.     Diagnosis     ICD-10-CM    1. Enteritis  K52.9       2. Lower abdominal pain  R10.30       3. Intestinal obstruction, unspecified cause, unspecified whether partial or complete (H)  K56.609          Scribe Disclosure:  I, Blair Shelton, am serving as a scribe at 5:48 AM on 7/8/2025 to document services personally performed by Louise Barahona MD* based on my observations and the provider's statements to me.        Louise Barahona MD  07/08/25 0416

## 2025-07-08 NOTE — CONSULTS
General Surgery Consultation    Simone Johnson MRN#: [id number]   Age: 50 year old YOB: 1975     Date of Admission:  7/8/2025  Reason for consult: Possible early SBO       Requesting physician: Stevo Langley       Surgeon:        Joaquin Modi        Chief Complaint:   Abdominal pain     History is obtained from the patient and chart review.         History of Present Illness:   General Surgery was asked to evaluate this patient at the request of Dr. Langley for abdominal pain and CT suggesting possible early small bowel obstruction.    This patient is a 50 year old  male with a significant past medical history of ulcerative colitis and perforated duodenal ulcer who presents with diffuse abdominal pain that woke him out of his sleep.  Of note, he had symptoms of nausea, vomiting, and diarrhea that he was see in ED for on 7/6, but which resolved in the ED with IV fluids and Zofran.  When he returned home, he felt better but the diarrhea persisted.  This has since stopped, along with passing flatus, and he has had NO bowel function since coming to hospital today.  General Surgery was consulted for poss SBO.    During his workup, CT of abdomen showed findings consistent with proximal enteritis, as well as fluid-filled loops of small bowel throughout the abdomen, borderline distended in the pelvis with gradual narrowing of distal small bowel caliber. Developing obstructive process not excluded.     He was admitted to Hospitalist team, started on IV fluids, PRN pain medications, antiemetics and antianxiety medications.  He is feeling a little better at this time, but is drowsy and reports he is still not passing flatus.  He has no appetite. Denies fevers or chills, recent travel or sick exposure.  He was having some SOB due to the pain with deeper breathing, but this has been alleviated with antianxiety medication.          Past Medical History:   Simone Johnson  has a past medical history  "of Ulcerative colitis (H).          Past Surgical History:     Past Surgical History:   Procedure Laterality Date    HERNIA REPAIR               Social History:     Social History     Tobacco Use    Smoking status: Never    Smokeless tobacco: Never   Substance Use Topics    Alcohol use: Yes     Alcohol/week: 3.0 standard drinks of alcohol     Types: 3 Standard drinks or equivalent per week     Comment: couple beers per week             Family History:   This patient has no significant family history          Allergies:     Allergies   Allergen Reactions    Amoxicillin      Tolerates cephalosporins (ceftriaxone and cefdinir)             Medications:     Prior to Admission medications    Medication Sig Start Date End Date Taking? Authorizing Provider   dicyclomine (BENTYL) 20 MG tablet Take 1 tablet by mouth 3 times daily as needed. 5/16/22  Yes Reported, Patient   ondansetron (ZOFRAN ODT) 4 MG ODT tab Take 4 mg by mouth every 8 hours as needed.   Yes Unknown, Entered By History   pantoprazole (PROTONIX) 40 MG EC tablet Take 1 tablet (40 mg) by mouth daily Future refills by PCP Dr. [facility] with phone number None. 12/20/20  Yes Angelica Milner MD             Review of Systems:   The Review of Systems is negative other than noted in the HPI          Physical Exam:   Blood pressure (!) (P) 156/113, pulse (P) 113, temperature (P) 98.8  F (37.1  C), temperature source (P) Oral, resp. rate 23, height 1.778 m (5' 10\"), weight 101.1 kg (222 lb 14.2 oz), SpO2 (P) 95%.    General - This is a well developed, well nourished male in no apparent distress.  HEENT - Normocephalic. Atraumatic. Moist mucous membranes. Pupils equal.  No scleral icterus.  Neck - Supple without masses.  Lungs - Clear to ascultation bilaterally.    Heart - Regular rate & rhythm without murmur.  Abdomen - Soft, mildly tender, mildly distended    Partially guards with examining lower abdomen, site of most pain   Extremities - " Moves all extremities. Warm without edema.  Neurologic - Nonfocal.          Data:   Labs:  WBC -   WBC   Date Value Ref Range Status   12/28/2020 4.2 4.0 - 11.0 10e9/L Final     WBC Count   Date Value Ref Range Status   07/08/2025 8.4 4.0 - 11.0 10e3/uL Final     Hgb -   Hemoglobin   Date Value Ref Range Status   07/08/2025 15.7 13.3 - 17.7 g/dL Final   12/28/2020 10.0 (L) 13.3 - 17.7 g/dL Final       Liver Function Studies -   Recent Labs   Lab Test 07/08/25  0530   PROTTOTAL 6.7   ALBUMIN 4.0   BILITOTAL 0.6   ALKPHOS 78   AST 14   ALT 14       CT scan of the abdomen:   IMPRESSION:  1.  Findings consistent with a nonspecific proximal enteritis.     2.  Fluid-filled loops of small bowel throughout the abdomen, borderline distended in the pelvis with gradual narrowing of distal small bowel caliber. Developing obstructive process not excluded.     3.  No aortic aneurysm or dissection.  As read by Radiology     Ultrasound of the abdomen: none    EKG:   Complete; See Chart         Assessment:     Abdominal pain  Enteritis  Small Bowel Obstruction         Plan:     Discussed with patient our role in his care, his pathology of enteritis and possibly SBO.  We would treat this conservatively and only go to OR if this were to fail.  He is comfortable now.  We will hold on NGT placement unless this changes and he has increasing N/V, abdominal pain or distention.  Encouraged to change position frequently and ambulate with nursing assistants when he feels he can do so.  C.diff stool sample ordered, but has not been able to have stools since arrival.    - Continue bowel rest.  Add NGT if needed  - Encourage out of bed when pain better  - Await return of bowel function before starting a diet.    - Endoscopy per GI if needed.  - Acute Care Surgery team will follow along, but hopeful for conservative management.    Total time spent reviewing chart/labs/imaging, evaluating and educating patient, documenting findings, and developing  and communicating plan of care:  52 min      Mannie Tavarez PA-C, physician assistant for Joaquin Modi  [facility], [phone]  Pager [phone]

## 2025-07-08 NOTE — PLAN OF CARE
Goal Outcome Evaluation:Acute abdominal pain, vomiting, diarrhea. Left shoulder pain improving.   Date -time 07/08/09349-0010    Orientation: A/O  x4 calm/cooperative, pt reports severe abdominal pain upper  chest discomfort,  short of breath, nausea, nausea and anxiety.     Vitals: vss on 3 liter  of oxygen     IV Access/drains:  on tele   Tachy 112-115 PIV NS infusing 100 ml  hr     Diet: NPO  except meds     Mobility: Ind in room     GI/: Continent both B/B     Wound/Skin: WDL     Consults: GI General surgery     Discharge Plan: TBD     Observation goals  PRIOR TO DISCHARGE     Not met yet    Comments: -diagnostic tests and consults completed and resulted: not met yet  -vital signs normal or at patient baseline:  met   -tolerating oral intake to maintain hydration:  not met   -adequate pain control on oral analgesics: not met   Nurse to notify provider when observation goals have been met and patient is ready for discharge.

## 2025-07-08 NOTE — PROGRESS NOTES
Observation goals  PRIOR TO DISCHARGE     Not met yet    Comments: -diagnostic tests and consults completed and resulted: not met yet  -vital signs normal or at patient baseline:  met   -tolerating oral intake to maintain hydration:  not met   -adequate pain control on oral analgesics: not met   Nurse to notify provider when observation goals have been met and patient is ready for discharge.

## 2025-07-08 NOTE — PROGRESS NOTES
Admission/Transfer from: ED  2 RN skin assessment completed. Yes with RN Mary Barrera  Significant findings include: WDL  WOC Nurse Consult Ordered? No

## 2025-07-08 NOTE — ED NOTES
[facility]  ED Nurse Handoff Report    ED Chief complaint: Flank Pain (L flank pain started 30 min ago,no HX kidney stones)      ED Diagnosis:   Final diagnoses:   None       Code Status: for hospitalist to address.    Allergies:   Allergies   Allergen Reactions    Amoxicillin      Tolerates cephalosporins (ceftriaxone and cefdinir)       Patient Story: presents for evaluation of left flank pain. Patient reports he was dealing with ulcer colitis the weekend of the 07/05/25, which worsened on 07/06/25 to nausea and vomiting. Today he states that he started experiencing lower abdominal pain which he has never experienced with his ulcer colitis. He notes that the pain radiates into his lower back, and his shoulder. Hx.of GERD  Focused Assessment:  ambulatory, A&Ox4, anxious and restless. Respirations even and unlabored. Skin clammy and pale. Guarding abdomen.   Results for orders placed or performed during the hospital encounter of 07/08/25   CT Aortic Survey w Contrast     Status: None    Narrative    EXAM: CT AORTIC SURVEY W CONTRAST  LOCATION: [facility]  DATE: 7/8/2025    INDICATION: lower abdominal pain, flank pain. also has left shoulder pain. hx of ulcerative colitis. rule out dissection. but also concerned for ureteral stone, colitis diverticulitis  COMPARISON: CT abdomen/pelvis from 3/21/2022.  TECHNIQUE: CT angiogram chest abdomen pelvis during arterial phase of injection of IV contrast. 2D and 3D MIP reconstructions were performed by the CT technologist. Dose reduction techniques were used.   CONTRAST: 72 mL of Isovue-370.    FINDINGS:   CT ANGIOGRAM CHEST, ABDOMEN, AND PELVIS: No hyperdense acute aortic intramural hematoma on the noncontrast series. Normal caliber thoracoabdominal aorta without atherosclerotic calcification. After the administration of IV contrast, there is no evidence   of dissection. Three-vessel aortic arch with patent arch vessels. The  abdominal aortic branch vessels are patent. The iliac and proximal femoral arteries are patent. No central pulmonary embolism.    LUNGS AND PLEURA: No acute airspace disease. No interstitial edema. No pneumothorax or pleural effusion.    MEDIASTINUM/AXILLAE: Normal heart size. No pericardial effusion.    CORONARY ARTERY CALCIFICATION: None.    HEPATOBILIARY: Normal.    PANCREAS: Normal.    SPLEEN: There is some low density fluid adjacent to the spleen, though the spleen is within normal limits for arterial phase appearance.    ADRENAL GLANDS: Normal.    KIDNEYS/BLADDER: Normal.    BOWEL: Fluid-filled stomach. Fluid-filled loops of small bowel throughout the abdomen, with borderline distended loops of small bowel in the pelvis. There is gradual narrowing of bowel caliber distally. Mild to moderate wall thickening of the proximal   jejunum as seen on image 59 of series 5. No free air. Normal appendix.    LYMPH NODES: Normal.    PELVIC ORGANS: Small amount of free fluid. Fat-containing left inguinal hernia.    MUSCULOSKELETAL: Normal.      Impression    IMPRESSION:  1.  Findings consistent with a nonspecific proximal enteritis.    2.  Fluid-filled loops of small bowel throughout the abdomen, borderline distended in the pelvis with gradual narrowing of distal small bowel caliber. Developing obstructive process not excluded.    3.  No aortic aneurysm or dissection.     Comprehensive metabolic panel     Status: Abnormal   Result Value Ref Range    Sodium 136 135 - 145 mmol/L    Potassium 3.6 3.4 - 5.3 mmol/L    Carbon Dioxide (CO2) 25 22 - 29 mmol/L    Anion Gap 13 7 - 15 mmol/L    Urea Nitrogen 5.6 (L) 6.0 - 20.0 mg/dL    Creatinine 1.01 0.67 - 1.17 mg/dL    GFR Estimate >90 >60 mL/min/1.73m2    Calcium 8.6 (L) 8.8 - 10.4 mg/dL    Chloride 98 98 - 107 mmol/L    Glucose 135 (H) 70 - 99 mg/dL    Alkaline Phosphatase 78 40 - 150 U/L    AST 14 0 - 45 U/L    ALT 14 0 - 70 U/L    Protein Total 6.7 6.4 - 8.3 g/dL    Albumin 4.0  3.5 - 5.2 g/dL    Bilirubin Total 0.6 <=1.2 mg/dL   Lipase     Status: Abnormal   Result Value Ref Range    Lipase 259 (H) 13 - 60 U/L   CBC with platelets and differential     Status: Abnormal   Result Value Ref Range    WBC Count 8.4 4.0 - 11.0 10e3/uL    RBC Count 5.36 4.40 - 5.90 10e6/uL    Hemoglobin 15.7 13.3 - 17.7 g/dL    Hematocrit 47.2 40.0 - 53.0 %    MCV 88 78 - 100 fL    MCH 29.3 26.5 - 33.0 pg    MCHC 33.3 31.5 - 36.5 g/dL    RDW 15.9 (H) 10.0 - 15.0 %    Platelet Count 341 150 - 450 10e3/uL    % Neutrophils 58 %    % Lymphocytes 29 %    % Monocytes 10 %    % Eosinophils 2 %    % Basophils 1 %    % Immature Granulocytes 0 %    NRBCs per 100 WBC 0 <1 /100    Absolute Neutrophils 4.8 1.6 - 8.3 10e3/uL    Absolute Lymphocytes 2.4 0.8 - 5.3 10e3/uL    Absolute Monocytes 0.9 0.0 - 1.3 10e3/uL    Absolute Eosinophils 0.2 0.0 - 0.7 10e3/uL    Absolute Basophils 0.1 0.0 - 0.2 10e3/uL    Absolute Immature Granulocytes 0.0 <=0.4 10e3/uL    Absolute NRBCs 0.0 10e3/uL   Troponin T, High Sensitivity     Status: Normal   Result Value Ref Range    Troponin T, High Sensitivity <6 <=22 ng/L   EKG 12-lead, tracing only     Status: None (Preliminary result)   Result Value Ref Range    Systolic Blood Pressure  mmHg    Diastolic Blood Pressure  mmHg    Ventricular Rate 54 BPM    Atrial Rate 54 BPM    WA Interval 174 ms    QRS Duration 76 ms     ms    QTc 379 ms    P Axis 58 degrees    R AXIS 29 degrees    T Axis 39 degrees    Interpretation ECG       Sinus bradycardia  Low voltage QRS  Borderline ECG  When compared with ECG of 16-Dec-2020 14:17,  Non-specific change in ST segment in Inferior leads  T wave inversion no longer evident in Inferior leads  Nonspecific T wave abnormality no longer evident in Lateral leads  QT has shortened     CBC with platelets differential     Status: Abnormal    Narrative    The following orders were created for panel order CBC with platelets differential.  Procedure                      "          Abnormality         Status                     ---------                               -----------         ------                     CBC with platelets and ...[[id number]]  Abnormal            Final result                 Please view results for these tests on the individual orders.       Treatments and/or interventions provided:   Medications   ondansetron (ZOFRAN) injection 4 mg (4 mg Intravenous $Given 7/8/25 0531)   HYDROmorphone (PF) (DILAUDID) injection 0.5 mg (0.5 mg Intravenous $Given 7/8/25 0605)   sodium chloride 0.9% BOLUS 1,000 mL (0 mLs Intravenous Stopped 7/8/25 0603)   iopamidol (ISOVUE-370) solution 72 mL (72 mLs Intravenous $Given 7/8/25 0555)   sodium chloride 0.9 % bag for CT scan flush (90 mLs Intravenous $Given 7/8/25 0555)     Patient's response to treatments and/or interventions: VSS, comfortably resting in stretcher, pain improving.    To be done/followed up on inpatient unit:  GI consult?    Does this patient have any cognitive concerns?: n/a    Activity level - Baseline/Home:  Independent  Activity Level - Current:   Independent    Patient's Preferred language: English   Needed?: No    Isolation: None  Infection: Not Applicable  Sepsis treatment initiated: No  Patient tested for COVID 19 prior to admission: NO  Bariatric?: No    Vital Signs:   Vitals:    07/08/25 0522 07/08/25 0535 07/08/25 0615   BP: (!) 142/92  (!) 145/96   Pulse: 112 65 67   Resp: 19  17   Temp: 97.9  F (36.6  C)     TempSrc: Oral     SpO2: 100% 99% 93%   Weight: 97.5 kg (215 lb)     Height: 1.778 m (5' 10\")         Cardiac Rhythm:     Was the PSS-3 completed:   Yes  Family Comments: wife at bedside.  OBS brochure/video discussed/provided to patient/family: N/A  For the majority of the shift this patient's behavior was Green.   Behavioral interventions performed were n/a.    ED NURSE PHONE NUMBER: [phone]       "

## 2025-07-09 ENCOUNTER — ANESTHESIA (OUTPATIENT)
Dept: SURGERY | Facility: CLINIC | Age: 50
End: 2025-07-09
Payer: COMMERCIAL

## 2025-07-09 ENCOUNTER — ANESTHESIA EVENT (OUTPATIENT)
Dept: SURGERY | Facility: CLINIC | Age: 50
End: 2025-07-09
Payer: COMMERCIAL

## 2025-07-09 PROBLEM — K63.1 PERFORATION OF INTESTINE (H): Status: ACTIVE | Noted: 2025-07-09

## 2025-07-09 LAB
ALBUMIN SERPL BCG-MCNC: 2.8 G/DL (ref 3.5–5.2)
ALBUMIN SERPL BCG-MCNC: 3.6 G/DL (ref 3.5–5.2)
ALP SERPL-CCNC: 43 U/L (ref 40–150)
ALP SERPL-CCNC: 56 U/L (ref 40–150)
ALT SERPL W P-5'-P-CCNC: 12 U/L (ref 0–70)
ALT SERPL W P-5'-P-CCNC: 9 U/L (ref 0–70)
ANION GAP SERPL CALCULATED.3IONS-SCNC: 11 MMOL/L (ref 7–15)
ANION GAP SERPL CALCULATED.3IONS-SCNC: 12 MMOL/L (ref 7–15)
ANION GAP SERPL CALCULATED.3IONS-SCNC: 15 MMOL/L (ref 7–15)
AST SERPL W P-5'-P-CCNC: 12 U/L (ref 0–45)
AST SERPL W P-5'-P-CCNC: 17 U/L (ref 0–45)
BILIRUB DIRECT SERPL-MCNC: 0.24 MG/DL (ref 0–0.3)
BILIRUB SERPL-MCNC: 0.6 MG/DL
BILIRUB SERPL-MCNC: 0.6 MG/DL
BUN SERPL-MCNC: 16 MG/DL (ref 6–20)
BUN SERPL-MCNC: 6.5 MG/DL (ref 6–20)
BUN SERPL-MCNC: 9.5 MG/DL (ref 6–20)
CALCIUM SERPL-MCNC: 8.3 MG/DL (ref 8.8–10.4)
CALCIUM SERPL-MCNC: 8.3 MG/DL (ref 8.8–10.4)
CALCIUM SERPL-MCNC: 8.5 MG/DL (ref 8.8–10.4)
CHLORIDE SERPL-SCNC: 101 MMOL/L (ref 98–107)
CHLORIDE SERPL-SCNC: 103 MMOL/L (ref 98–107)
CHLORIDE SERPL-SCNC: 103 MMOL/L (ref 98–107)
CREAT SERPL-MCNC: 0.86 MG/DL (ref 0.67–1.17)
CREAT SERPL-MCNC: 0.99 MG/DL (ref 0.67–1.17)
CREAT SERPL-MCNC: 1.23 MG/DL (ref 0.67–1.17)
EGFRCR SERPLBLD CKD-EPI 2021: 72 ML/MIN/1.73M2
EGFRCR SERPLBLD CKD-EPI 2021: >90 ML/MIN/1.73M2
EGFRCR SERPLBLD CKD-EPI 2021: >90 ML/MIN/1.73M2
ERYTHROCYTE [DISTWIDTH] IN BLOOD BY AUTOMATED COUNT: 16.4 % (ref 10–15)
ERYTHROCYTE [DISTWIDTH] IN BLOOD BY AUTOMATED COUNT: 16.8 % (ref 10–15)
FASTING STATUS PATIENT QL REPORTED: YES
GLUCOSE SERPL-MCNC: 131 MG/DL (ref 70–99)
GLUCOSE SERPL-MCNC: 133 MG/DL (ref 70–99)
GLUCOSE SERPL-MCNC: 137 MG/DL (ref 70–99)
GLUCOSE SERPL-MCNC: 144 MG/DL (ref 70–99)
GLUCOSE SERPL-MCNC: 144 MG/DL (ref 70–99)
HCO3 SERPL-SCNC: 20 MMOL/L (ref 22–29)
HCO3 SERPL-SCNC: 21 MMOL/L (ref 22–29)
HCO3 SERPL-SCNC: 23 MMOL/L (ref 22–29)
HCT VFR BLD AUTO: 45.2 % (ref 40–53)
HCT VFR BLD AUTO: 50.6 % (ref 40–53)
HGB BLD-MCNC: 15.1 G/DL (ref 13.3–17.7)
HGB BLD-MCNC: 16.9 G/DL (ref 13.3–17.7)
HGB BLD-MCNC: 17.5 G/DL (ref 13.3–17.7)
HOLD SPECIMEN: NORMAL
LIPASE SERPL-CCNC: 17 U/L (ref 13–60)
MCH RBC QN AUTO: 29.4 PG (ref 26.5–33)
MCH RBC QN AUTO: 29.7 PG (ref 26.5–33)
MCHC RBC AUTO-ENTMCNC: 33.4 G/DL (ref 31.5–36.5)
MCHC RBC AUTO-ENTMCNC: 33.4 G/DL (ref 31.5–36.5)
MCV RBC AUTO: 88 FL (ref 78–100)
MCV RBC AUTO: 89 FL (ref 78–100)
PLATELET # BLD AUTO: 267 10E3/UL (ref 150–450)
PLATELET # BLD AUTO: 326 10E3/UL (ref 150–450)
POTASSIUM SERPL-SCNC: 3.7 MMOL/L (ref 3.4–5.3)
POTASSIUM SERPL-SCNC: 4 MMOL/L (ref 3.4–5.3)
POTASSIUM SERPL-SCNC: 4 MMOL/L (ref 3.4–5.3)
PROT SERPL-MCNC: 5.3 G/DL (ref 6.4–8.3)
PROT SERPL-MCNC: 6.4 G/DL (ref 6.4–8.3)
RADIOLOGIST FLAGS: ABNORMAL
RBC # BLD AUTO: 5.08 10E6/UL (ref 4.4–5.9)
RBC # BLD AUTO: 5.75 10E6/UL (ref 4.4–5.9)
SODIUM SERPL-SCNC: 136 MMOL/L (ref 135–145)
SODIUM SERPL-SCNC: 136 MMOL/L (ref 135–145)
SODIUM SERPL-SCNC: 137 MMOL/L (ref 135–145)
WBC # BLD AUTO: 6.1 10E3/UL (ref 4–11)
WBC # BLD AUTO: 8.1 10E3/UL (ref 4–11)
WBC # BLD AUTO: 8.9 10E3/UL (ref 4–11)

## 2025-07-09 PROCEDURE — 36415 COLL VENOUS BLD VENIPUNCTURE: CPT | Performed by: SURGERY

## 2025-07-09 PROCEDURE — 43840 GSTRRPHY SUTR DUOL/GSTR ULCR: CPT | Performed by: SURGERY

## 2025-07-09 PROCEDURE — 85018 HEMOGLOBIN: CPT | Performed by: HOSPITALIST

## 2025-07-09 PROCEDURE — G0378 HOSPITAL OBSERVATION PER HR: HCPCS

## 2025-07-09 PROCEDURE — 82947 ASSAY GLUCOSE BLOOD QUANT: CPT | Performed by: PHYSICIAN ASSISTANT

## 2025-07-09 PROCEDURE — 99233 SBSQ HOSP IP/OBS HIGH 50: CPT | Performed by: HOSPITALIST

## 2025-07-09 PROCEDURE — 258N000003 HC RX IP 258 OP 636: Performed by: SURGERY

## 2025-07-09 PROCEDURE — 96375 TX/PRO/DX INJ NEW DRUG ADDON: CPT

## 2025-07-09 PROCEDURE — 250N000011 HC RX IP 250 OP 636

## 2025-07-09 PROCEDURE — 258N000003 HC RX IP 258 OP 636: Performed by: ANESTHESIOLOGY

## 2025-07-09 PROCEDURE — 86316 IMMUNOASSAY TUMOR OTHER: CPT | Performed by: SURGERY

## 2025-07-09 PROCEDURE — 0DQA0ZZ REPAIR JEJUNUM, OPEN APPROACH: ICD-10-PCS | Performed by: SURGERY

## 2025-07-09 PROCEDURE — 0DUA07Z SUPPLEMENT JEJUNUM WITH AUTOLOGOUS TISSUE SUBSTITUTE, OPEN APPROACH: ICD-10-PCS | Performed by: SURGERY

## 2025-07-09 PROCEDURE — 360N000076 HC SURGERY LEVEL 3, PER MIN: Performed by: SURGERY

## 2025-07-09 PROCEDURE — 250N000011 HC RX IP 250 OP 636: Performed by: NURSE ANESTHETIST, CERTIFIED REGISTERED

## 2025-07-09 PROCEDURE — 82941 ASSAY OF GASTRIN: CPT | Performed by: SURGERY

## 2025-07-09 PROCEDURE — 99233 SBSQ HOSP IP/OBS HIGH 50: CPT | Mod: 57 | Performed by: SURGERY

## 2025-07-09 PROCEDURE — 250N000011 HC RX IP 250 OP 636: Performed by: PHYSICIAN ASSISTANT

## 2025-07-09 PROCEDURE — 36415 COLL VENOUS BLD VENIPUNCTURE: CPT | Performed by: PHYSICIAN ASSISTANT

## 2025-07-09 PROCEDURE — 82310 ASSAY OF CALCIUM: CPT | Performed by: SURGERY

## 2025-07-09 PROCEDURE — 83690 ASSAY OF LIPASE: CPT | Performed by: PHYSICIAN ASSISTANT

## 2025-07-09 PROCEDURE — 80048 BASIC METABOLIC PNL TOTAL CA: CPT | Performed by: HOSPITALIST

## 2025-07-09 PROCEDURE — 43840 GSTRRPHY SUTR DUOL/GSTR ULCR: CPT | Mod: AS | Performed by: PHYSICIAN ASSISTANT

## 2025-07-09 PROCEDURE — 82310 ASSAY OF CALCIUM: CPT | Performed by: PHYSICIAN ASSISTANT

## 2025-07-09 PROCEDURE — 250N000025 HC SEVOFLURANE, PER MIN: Performed by: SURGERY

## 2025-07-09 PROCEDURE — 82248 BILIRUBIN DIRECT: CPT | Performed by: HOSPITALIST

## 2025-07-09 PROCEDURE — 96376 TX/PRO/DX INJ SAME DRUG ADON: CPT

## 2025-07-09 PROCEDURE — 85018 HEMOGLOBIN: CPT | Performed by: SURGERY

## 2025-07-09 PROCEDURE — 250N000009 HC RX 250: Performed by: SURGERY

## 2025-07-09 PROCEDURE — 272N000001 HC OR GENERAL SUPPLY STERILE: Performed by: SURGERY

## 2025-07-09 PROCEDURE — 120N000013 HC R&B IMCU

## 2025-07-09 PROCEDURE — 258N000003 HC RX IP 258 OP 636: Performed by: HOSPITALIST

## 2025-07-09 PROCEDURE — 370N000017 HC ANESTHESIA TECHNICAL FEE, PER MIN: Performed by: SURGERY

## 2025-07-09 PROCEDURE — 250N000011 HC RX IP 250 OP 636: Performed by: ANESTHESIOLOGY

## 2025-07-09 PROCEDURE — 710N000009 HC RECOVERY PHASE 1, LEVEL 1, PER MIN: Performed by: SURGERY

## 2025-07-09 PROCEDURE — 82947 ASSAY GLUCOSE BLOOD QUANT: CPT | Performed by: SURGERY

## 2025-07-09 PROCEDURE — 250N000009 HC RX 250: Performed by: NURSE ANESTHETIST, CERTIFIED REGISTERED

## 2025-07-09 PROCEDURE — 258N000003 HC RX IP 258 OP 636: Performed by: PHYSICIAN ASSISTANT

## 2025-07-09 PROCEDURE — 250N000009 HC RX 250: Performed by: ANESTHESIOLOGY

## 2025-07-09 PROCEDURE — 85048 AUTOMATED LEUKOCYTE COUNT: CPT | Performed by: PHYSICIAN ASSISTANT

## 2025-07-09 PROCEDURE — 250N000011 HC RX IP 250 OP 636: Mod: JW | Performed by: PHYSICIAN ASSISTANT

## 2025-07-09 RX ORDER — ONDANSETRON 2 MG/ML
INJECTION INTRAMUSCULAR; INTRAVENOUS PRN
Status: DISCONTINUED | OUTPATIENT
Start: 2025-07-09 | End: 2025-07-09

## 2025-07-09 RX ORDER — SODIUM CHLORIDE 9 MG/ML
INJECTION, SOLUTION INTRAVENOUS CONTINUOUS
Status: ACTIVE | OUTPATIENT
Start: 2025-07-09 | End: 2025-07-09

## 2025-07-09 RX ORDER — PROPOFOL 10 MG/ML
INJECTION, EMULSION INTRAVENOUS PRN
Status: DISCONTINUED | OUTPATIENT
Start: 2025-07-09 | End: 2025-07-09

## 2025-07-09 RX ORDER — HYDROMORPHONE HYDROCHLORIDE 1 MG/ML
0.5 INJECTION, SOLUTION INTRAMUSCULAR; INTRAVENOUS; SUBCUTANEOUS
Status: DISCONTINUED | OUTPATIENT
Start: 2025-07-09 | End: 2025-07-12

## 2025-07-09 RX ORDER — MAGNESIUM HYDROXIDE 1200 MG/15ML
LIQUID ORAL PRN
Status: DISCONTINUED | OUTPATIENT
Start: 2025-07-09 | End: 2025-07-09 | Stop reason: HOSPADM

## 2025-07-09 RX ORDER — HYDROMORPHONE HYDROCHLORIDE 1 MG/ML
0.3 INJECTION, SOLUTION INTRAMUSCULAR; INTRAVENOUS; SUBCUTANEOUS
Status: DISCONTINUED | OUTPATIENT
Start: 2025-07-09 | End: 2025-07-12

## 2025-07-09 RX ORDER — FENTANYL CITRATE 50 UG/ML
INJECTION, SOLUTION INTRAMUSCULAR; INTRAVENOUS PRN
Status: DISCONTINUED | OUTPATIENT
Start: 2025-07-09 | End: 2025-07-09

## 2025-07-09 RX ORDER — SODIUM CHLORIDE, SODIUM LACTATE, POTASSIUM CHLORIDE, CALCIUM CHLORIDE 600; 310; 30; 20 MG/100ML; MG/100ML; MG/100ML; MG/100ML
INJECTION, SOLUTION INTRAVENOUS CONTINUOUS PRN
Status: DISCONTINUED | OUTPATIENT
Start: 2025-07-09 | End: 2025-07-09

## 2025-07-09 RX ORDER — DEXAMETHASONE SODIUM PHOSPHATE 4 MG/ML
INJECTION, SOLUTION INTRA-ARTICULAR; INTRALESIONAL; INTRAMUSCULAR; INTRAVENOUS; SOFT TISSUE PRN
Status: DISCONTINUED | OUTPATIENT
Start: 2025-07-09 | End: 2025-07-09

## 2025-07-09 RX ORDER — KETOROLAC TROMETHAMINE 30 MG/ML
30 INJECTION, SOLUTION INTRAMUSCULAR; INTRAVENOUS EVERY 6 HOURS
Status: COMPLETED | OUTPATIENT
Start: 2025-07-09 | End: 2025-07-09

## 2025-07-09 RX ORDER — SODIUM CHLORIDE 9 MG/ML
INJECTION, SOLUTION INTRAVENOUS CONTINUOUS
Status: DISCONTINUED | OUTPATIENT
Start: 2025-07-09 | End: 2025-07-11

## 2025-07-09 RX ORDER — LIDOCAINE HYDROCHLORIDE 20 MG/ML
INJECTION, SOLUTION INFILTRATION; PERINEURAL PRN
Status: DISCONTINUED | OUTPATIENT
Start: 2025-07-09 | End: 2025-07-09

## 2025-07-09 RX ORDER — LIDOCAINE 40 MG/G
CREAM TOPICAL
Status: DISCONTINUED | OUTPATIENT
Start: 2025-07-09 | End: 2025-07-16 | Stop reason: HOSPADM

## 2025-07-09 RX ORDER — PIPERACILLIN SODIUM, TAZOBACTAM SODIUM 4; .5 G/20ML; G/20ML
4.5 INJECTION, POWDER, LYOPHILIZED, FOR SOLUTION INTRAVENOUS EVERY 6 HOURS
Status: DISCONTINUED | OUTPATIENT
Start: 2025-07-09 | End: 2025-07-15

## 2025-07-09 RX ADMIN — PHENYLEPHRINE HYDROCHLORIDE 100 MCG: 10 INJECTION INTRAVENOUS at 03:12

## 2025-07-09 RX ADMIN — PIPERACILLIN AND TAZOBACTAM 4.5 G: 4; .5 INJECTION, POWDER, FOR SOLUTION INTRAVENOUS at 12:16

## 2025-07-09 RX ADMIN — PIPERACILLIN AND TAZOBACTAM 4.5 G: 4; .5 INJECTION, POWDER, FOR SOLUTION INTRAVENOUS at 01:01

## 2025-07-09 RX ADMIN — KETOROLAC TROMETHAMINE 30 MG: 30 INJECTION, SOLUTION INTRAMUSCULAR at 05:25

## 2025-07-09 RX ADMIN — HYDROMORPHONE HYDROCHLORIDE 0.3 MG: 1 INJECTION, SOLUTION INTRAMUSCULAR; INTRAVENOUS; SUBCUTANEOUS at 15:31

## 2025-07-09 RX ADMIN — LIDOCAINE HYDROCHLORIDE 80 MG: 20 INJECTION, SOLUTION INFILTRATION; PERINEURAL at 02:18

## 2025-07-09 RX ADMIN — HYDROMORPHONE HYDROCHLORIDE 0.5 MG: 1 INJECTION, SOLUTION INTRAMUSCULAR; INTRAVENOUS; SUBCUTANEOUS at 18:38

## 2025-07-09 RX ADMIN — FENTANYL CITRATE 100 MCG: 50 INJECTION INTRAMUSCULAR; INTRAVENOUS at 02:18

## 2025-07-09 RX ADMIN — DEXAMETHASONE SODIUM PHOSPHATE 4 MG: 4 INJECTION, SOLUTION INTRA-ARTICULAR; INTRALESIONAL; INTRAMUSCULAR; INTRAVENOUS; SOFT TISSUE at 03:23

## 2025-07-09 RX ADMIN — PANTOPRAZOLE SODIUM 40 MG: 40 INJECTION, POWDER, FOR SOLUTION INTRAVENOUS at 16:10

## 2025-07-09 RX ADMIN — PIPERACILLIN AND TAZOBACTAM 4.5 G: 4; .5 INJECTION, POWDER, FOR SOLUTION INTRAVENOUS at 20:48

## 2025-07-09 RX ADMIN — SODIUM CHLORIDE, SODIUM LACTATE, POTASSIUM CHLORIDE, AND CALCIUM CHLORIDE: .6; .31; .03; .02 INJECTION, SOLUTION INTRAVENOUS at 02:44

## 2025-07-09 RX ADMIN — PROPOFOL 200 MG: 10 INJECTION, EMULSION INTRAVENOUS at 02:18

## 2025-07-09 RX ADMIN — Medication 200 MG: at 03:49

## 2025-07-09 RX ADMIN — SODIUM CHLORIDE, SODIUM LACTATE, POTASSIUM CHLORIDE, AND CALCIUM CHLORIDE: .6; .31; .03; .02 INJECTION, SOLUTION INTRAVENOUS at 02:14

## 2025-07-09 RX ADMIN — HYDROMORPHONE HYDROCHLORIDE 0.3 MG: 1 INJECTION, SOLUTION INTRAMUSCULAR; INTRAVENOUS; SUBCUTANEOUS at 08:10

## 2025-07-09 RX ADMIN — MIDAZOLAM 2 MG: 1 INJECTION INTRAMUSCULAR; INTRAVENOUS at 02:18

## 2025-07-09 RX ADMIN — SODIUM CHLORIDE: 0.9 INJECTION, SOLUTION INTRAVENOUS at 01:38

## 2025-07-09 RX ADMIN — PIPERACILLIN AND TAZOBACTAM 4.5 G: 4; .5 INJECTION, POWDER, FOR SOLUTION INTRAVENOUS at 06:33

## 2025-07-09 RX ADMIN — DEXMEDETOMIDINE HYDROCHLORIDE 12 MCG: 100 INJECTION, SOLUTION INTRAVENOUS at 02:43

## 2025-07-09 RX ADMIN — SODIUM CHLORIDE: 900 INJECTION INTRAVENOUS at 14:26

## 2025-07-09 RX ADMIN — HYDROMORPHONE HYDROCHLORIDE 0.5 MG: 1 INJECTION, SOLUTION INTRAMUSCULAR; INTRAVENOUS; SUBCUTANEOUS at 02:58

## 2025-07-09 RX ADMIN — ROCURONIUM BROMIDE 30 MG: 50 INJECTION, SOLUTION INTRAVENOUS at 03:05

## 2025-07-09 RX ADMIN — ROCURONIUM BROMIDE 50 MG: 50 INJECTION, SOLUTION INTRAVENOUS at 02:31

## 2025-07-09 RX ADMIN — DEXMEDETOMIDINE HYDROCHLORIDE 8 MCG: 100 INJECTION, SOLUTION INTRAVENOUS at 03:03

## 2025-07-09 RX ADMIN — HYDROMORPHONE HYDROCHLORIDE 0.5 MG: 1 INJECTION, SOLUTION INTRAMUSCULAR; INTRAVENOUS; SUBCUTANEOUS at 22:21

## 2025-07-09 RX ADMIN — SODIUM CHLORIDE: 0.9 INJECTION, SOLUTION INTRAVENOUS at 05:21

## 2025-07-09 RX ADMIN — SUCCINYLCHOLINE CHLORIDE 160 MG: 20 INJECTION, SOLUTION INTRAMUSCULAR; INTRAVENOUS; PARENTERAL at 02:18

## 2025-07-09 RX ADMIN — SODIUM CHLORIDE 500 ML: 9 INJECTION, SOLUTION INTRAVENOUS at 10:19

## 2025-07-09 RX ADMIN — ONDANSETRON 4 MG: 2 INJECTION INTRAMUSCULAR; INTRAVENOUS at 03:23

## 2025-07-09 RX ADMIN — HYDROMORPHONE HYDROCHLORIDE 0.5 MG: 1 INJECTION, SOLUTION INTRAMUSCULAR; INTRAVENOUS; SUBCUTANEOUS at 10:16

## 2025-07-09 RX ADMIN — PANTOPRAZOLE SODIUM 40 MG: 40 INJECTION, POWDER, FOR SOLUTION INTRAVENOUS at 06:33

## 2025-07-09 RX ADMIN — HYDROMORPHONE HYDROCHLORIDE 0.5 MG: 1 INJECTION, SOLUTION INTRAMUSCULAR; INTRAVENOUS; SUBCUTANEOUS at 00:25

## 2025-07-09 RX ADMIN — HYDROMORPHONE HYDROCHLORIDE 0.5 MG: 1 INJECTION, SOLUTION INTRAMUSCULAR; INTRAVENOUS; SUBCUTANEOUS at 12:29

## 2025-07-09 RX ADMIN — KETOROLAC TROMETHAMINE 30 MG: 30 INJECTION, SOLUTION INTRAMUSCULAR at 10:16

## 2025-07-09 ASSESSMENT — ACTIVITIES OF DAILY LIVING (ADL)
ADLS_ACUITY_SCORE: 55
ADLS_ACUITY_SCORE: 55
ADLS_ACUITY_SCORE: 57
ADLS_ACUITY_SCORE: 55
ADLS_ACUITY_SCORE: 55
ADLS_ACUITY_SCORE: 51
ADLS_ACUITY_SCORE: 53
ADLS_ACUITY_SCORE: 55
ADLS_ACUITY_SCORE: 53
ADLS_ACUITY_SCORE: 53
ADLS_ACUITY_SCORE: 55
ADLS_ACUITY_SCORE: 53
ADLS_ACUITY_SCORE: 55
ADLS_ACUITY_SCORE: 57
ADLS_ACUITY_SCORE: 55
ADLS_ACUITY_SCORE: 53

## 2025-07-09 NOTE — ANESTHESIA POSTPROCEDURE EVALUATION
Patient: Simone Johnson    Procedure: Procedure(s):  exploratory laparotomy, with repair of perforated jejunal ulcer       Anesthesia Type:  General    Note:  Disposition: Inpatient   Postop Pain Control: Uneventful            Sign Out: Well controlled pain   PONV: No   Neuro/Psych: Uneventful            Sign Out: Acceptable/Baseline neuro status   Airway/Respiratory: Uneventful            Sign Out: Acceptable/Baseline resp. status   CV/Hemodynamics: Uneventful            Sign Out: Acceptable CV status; No obvious hypovolemia; No obvious fluid overload   Other NRE: NONE   DID A NON-ROUTINE EVENT OCCUR?        Last vitals:  Vitals Value Taken Time   /87 07/09/25 04:30   Temp 36.2  C (97.2  F) 07/09/25 04:02   Pulse 113 07/09/25 04:40   Resp 25 07/09/25 04:40   SpO2 94 % 07/09/25 04:39   Vitals shown include unfiled device data.    Electronically Signed By: Nigel Eli DO, DO  July 9, 2025  4:57 AM

## 2025-07-09 NOTE — PROGRESS NOTES
Observation goals  PRIOR TO DISCHARGE     Not met    Comments: -diagnostic tests and consults completed and resulted: not met  -vital signs normal or at patient baseline: not met. Pt is tachycardic 120  -tolerating oral intake to maintain hydration:  not met. Pt stays NPO  -adequate pain control on oral analgesics: not met   Nurse to notify provider when observation goals have been met and patient is ready for discharge.

## 2025-07-09 NOTE — OP NOTE
General Surgery Operative Note    PREOPERATIVE DIAGNOSIS: small bowel perforation    POSTOPERATIVE DIAGNOSIS: duodenojejunal perforation    PROCEDURE: 1.  Exploratory laparotomy with repair of duodenal jejunal perforation and abdominal lavage with drain placement      SURGEON:  Ammy Dang MD    ASSISTANT:  Rosales White PA-C  The PA s assistance was medically necessary to provide adequate exposure in the operating field, maintain hemostasis, cutting suture, clamping and ligating bleeding vessels, and visualization of anatomic structures throughout the surgical procedure.       ANESTHESIA:  General.    BLOOD LOSS: 10ml    FINDINGS: 1.5 L of succus throughout the abdomen, 1 cm perforation identified at ligament of Treitz, ligament of Treitz mobilized and perforation repaired primarily, covered with eviseal and omentum.     INDICATIONS:   Britton is a 50-year-old male with history of prior esophageal stenosis and question of ulcerative colitis as well as prior contained duodenal ulcer perforation who presented with abdominal pain and initial CT revealed concern for enteritis of the proximal jejunum and potential developing bowel obstruction.  He had worsening abdominal pain and tachycardia and a repeat CT was performed which revealed concern for bowel perforation with free air and increased free fluid.  We discussed CT findings and recommendation for emergent laparotomy and he agreed to proceed.  We discussed the risks, benefits, indications and alternatives.    DETAILS OF PROCEDURE: The patient was brought to the operating room per Anesthesia, placed in supine position, and intubated without difficulty. Perioperative antibiotics were administered. The abdomen was prepped and draped in standard fashion. A wei catheter was placed using sterile technique.     A Surgical timeout was then performed, verifying the correct surgeon, site, procedure, and patient and all in the room were in agreement.     We made a midline  laparotomy incision starting from just above the umbilicus to just below using a 10 blade.  Cautery was used to divide the subcutaneous tissue and the fascia along the midline.  The previously placed mesh from an umbilical hernia repair was encountered and divided.  The fascia was opened along the length of the incision.  An Dave wound retractor was placed for improved exposure and wound protection.  The sigmoid colon was protuberant within the field and with manipulation of the bowel there was succus identified in the pelvis as well as upper abdomen.  1.5 L of succus was aspirated.  We then began evaluating the proximal small bowel given the CT findings.  There was fibrinous exudate across the proximal jejunum.  The bowel was also very dilated.  We followed the jejunum to the ligament of Treitz and identified a perforation at the ligament of Treitz.  We carefully mobilized the ligament of Treitz as much as possible along the antimesenteric aspect of the bowel using cautery to free up the area of perforation.  We were able to mobilize to the junction of 3rd to 4th portion of the duodenum along the inferior aspect of the pancreas.  The tissue around the site of perforation appeared healthy. There was no mass. No diverticulum identified.  We closed the perforation with several interrupted 3-0 Vicryl sutures.  Given the location it was not feasible to perform a bowel resection at the site of perforation.  We then irrigated the abdomen with 5 L of saline until all of the irrigant was clear.  The nasogastric tube was positioned within the body of the stomach.  We ran the small bowel distally to the terminal ileum.  There was fibrinous exudate across the majority of the small bowel and the bowel was distended but there was no distal obstruction.  The cecum was normal.  The appendix appeared normal.  The ascending colon and transverse colon as well as descending colon were normal.  The sigmoid was normal although  protuberant and redundant.  There is formed stool palpable within the sigmoid colon.  The liver appeared normal.  The gallbladder was mildly distended.  I called Dr. Negrete as a second opinion given this is a rare site of perforation and discussed options.  We discussed considering a gastrojejunal anastomosis for bypass and felt this was unnecessary given the tissue quality at the perforation was very healthy and increased risk related to gastrojejunostomy.  We placed a 19 [place] round JANNET drain via a small incision on the left lateral abdominal wall and positioned in the left upper quadrant.  Omentum was placed over the area and EviSeal was then used over the suture repair and around the omentum to further secure the area.  The midline fascia was closed with 2 looped 0 PDS sutures and reinforced with interrupted #1 Vicryl sutures spaced every 2 to 3 cm.  The subcutaneous tissue was irrigated.  The skin was closed with staples.  Sterile dressings were placed.  All instrument, needle and sponge counts were correct at the conclusion of the procedure x 2.  Patient tolerated the procedure well and was taken to PACU in stable condition.    Ammy Dang MD

## 2025-07-09 NOTE — BRIEF OP NOTE
[facility]    Brief Operative Note    Pre-operative diagnosis: Perforation of intestine (H) [K63.1]  Post-operative diagnosis Same as pre-operative diagnosis    Procedure: exploratory laparotomy, repair of jejunal perforation    Surgeon: Surgeons and Role:     * Ammy Dang MD - Primary     * Rosales White PA-C - Assisting  Anesthesia: General   Estimated Blood Loss: 10 mL from 7/9/2025  2:12 AM to 7/9/2025  3:59 AM      Drains: Roderick-Leyva  Specimens: * No specimens in log *  Findings:   1150cc bilious fluid suctioned from abdomen. ~1cm perforation at LT identified and closed primarily with vistaseal used to secure fat patch. Normal surrounding bowel. JANNET drain placed. NG placed. 4L abdominal lavage.  Complications: None.  Implants: * No implants in log *      Rosales MOON. KAI White

## 2025-07-09 NOTE — CODE/RAPID RESPONSE
"[facility]  House [facility] RRT Note  7/8/2025   Time called: 2233  RRT called for: Abdominal pain  Code status: Full Code    Assessment & Plan   Simone Johnson is a 50 year old  male with medical history of  history of ulcerative colitis (details not available in epic), Duodenal ulcer presented to ED with abdominal pain, vomiting, diarrhea. Evidence of nonspecific proximal enteritis on CT with possible developing obstructive process. GI and general surgery consulted, opting for supportive cares and medical management.    I was paged to the bedside to evaluate Mr. Simone Johnson for an acute episode of worsening abdominal pain, tachycardia, and tachypnea. Upon my arrival the patient was supine in bed, moaning, rotating from side to side. Patient endorsed 10/10 abdominal pain which had been unrelieved by tylenol, dilaudid, atarax, and oxycodone. Patient had point tenderness and guarding across his entire abdomen. Abdomen was firm and patient endorsed being unable to take full breaths due to abdominal pain. Patient denied passing flatus. Patient was tachycardic, hypertensive, and tachypneic. Low-grade temp, no leukocytosis, last lactic was normal. Patient was diaphoretic, warm, good pulses in all extremities, no mottling. Lung sounds clear, requiring 3L O2 per NC.    Dilaudid 0.5 mg IV was administered early in the RRT with some relief with pain rating reduced from 10/10 to 7/10 but patient still moaning and diaphoretic. Additional dose of dilaudid 0.5 mg IV administered prior to CT so patient could lie flat. Depending on results I will discuss with general surgery. If there is no change from prior then focus on pain control overnight and decompress abdomen with NGT.    BP (!) 153/94 (BP Location: Right arm)   Pulse 113   Temp 99.1  F (37.3  C) (Oral)   Resp 20   Ht 1.778 m (5' 10\")   Wt 101.1 kg (222 lb 14.2 oz)   SpO2 94%   BMI 31.98 kg/m      Diagnosis:  Acute abdominal pain despite " analgesia  Query perforation or obstruction  - Pain was previously controlled but gradually worsened throughout the evening and now is uncontrolled with the same pain regimen.  - Tachycardia, tachypnea, diaphoresis and point tenderness with guarding  - Abdomen firm, rigid  - Patient has been seen by GI and general surgery, planned for medical management unless an indication for surgical intervention arises.  - Place NGT to LIS for decompression    Plan:  -- Dilaudid 0.5 mg at start of RRT and again prior to CT to enable patient to lie flat  -- CT abdomen/pelvis with contrast STAT  -- Labs   *CBC   *CMP   *Lactic acid: 1.4   *Ionized calcium: 4.3   *VBG: normal  -- NGT placement    At the conclusion of this RRT patient was hemodynamically stable and will remain on current unit.    His history is significant for:  Past Medical History:   Diagnosis Date    Ulcerative colitis (H)      Past Surgical History:   Procedure Laterality Date    HERNIA REPAIR         Review of Systems   The 10 point Review of Systems is negative other than noted in the HPI or here.     Allergies   Allergies   Allergen Reactions    Amoxicillin      Tolerates cephalosporins (ceftriaxone and cefdinir)       Physical Exam   Physical Exam  Constitutional:       General: He is in acute distress.      Appearance: He is ill-appearing and diaphoretic.   HENT:      Mouth/Throat:      Mouth: Mucous membranes are moist.   Eyes:      Pupils: Pupils are equal, round, and reactive to light.   Cardiovascular:      Rate and Rhythm: Regular rhythm. Tachycardia present.   Pulmonary:      Effort: Pulmonary effort is normal.      Breath sounds: Normal breath sounds.   Abdominal:      General: There is distension.      Tenderness: There is abdominal tenderness. There is guarding.   Musculoskeletal:      Right lower leg: No edema.      Left lower leg: No edema.   Skin:     General: Skin is warm.      Capillary Refill: Capillary refill takes less than 2 seconds.    Neurological:      General: No focal deficit present.      Mental Status: He is alert and oriented to person, place, and time.   Psychiatric:         Mood and Affect: Mood is anxious.         Vital Signs with Ranges:  Temp:  [97.9  F (36.6  C)-99.6  F (37.6  C)] 99.1  F (37.3  C)  Pulse:  [] 113  Resp:  [17-36] 20  BP: (138-156)/() 153/94  SpO2:  [88 %-100 %] 94 %  No intake/output data recorded.    Data   Recent Results (from the past 24 hours)   CBC with platelets differential    Narrative    The following orders were created for panel order CBC with platelets differential.  Procedure                               Abnormality         Status                     ---------                               -----------         ------                     CBC with platelets and ...[[id number]]  Abnormal            Final result                 Please view results for these tests on the individual orders.   Comprehensive metabolic panel   Result Value Ref Range    Sodium 136 135 - 145 mmol/L    Potassium 3.6 3.4 - 5.3 mmol/L    Carbon Dioxide (CO2) 25 22 - 29 mmol/L    Anion Gap 13 7 - 15 mmol/L    Urea Nitrogen 5.6 (L) 6.0 - 20.0 mg/dL    Creatinine 1.01 0.67 - 1.17 mg/dL    GFR Estimate >90 >60 mL/min/1.73m2    Calcium 8.6 (L) 8.8 - 10.4 mg/dL    Chloride 98 98 - 107 mmol/L    Glucose 135 (H) 70 - 99 mg/dL    Alkaline Phosphatase 78 40 - 150 U/L    AST 14 0 - 45 U/L    ALT 14 0 - 70 U/L    Protein Total 6.7 6.4 - 8.3 g/dL    Albumin 4.0 3.5 - 5.2 g/dL    Bilirubin Total 0.6 <=1.2 mg/dL   Lipase   Result Value Ref Range    Lipase 259 (H) 13 - 60 U/L   CBC with platelets and differential   Result Value Ref Range    WBC Count 8.4 4.0 - 11.0 10e3/uL    RBC Count 5.36 4.40 - 5.90 10e6/uL    Hemoglobin 15.7 13.3 - 17.7 g/dL    Hematocrit 47.2 40.0 - 53.0 %    MCV 88 78 - 100 fL    MCH 29.3 26.5 - 33.0 pg    MCHC 33.3 31.5 - 36.5 g/dL    RDW 15.9 (H) 10.0 - 15.0 %    Platelet Count 341 150 - 450 10e3/uL    %  Neutrophils 58 %    % Lymphocytes 29 %    % Monocytes 10 %    % Eosinophils 2 %    % Basophils 1 %    % Immature Granulocytes 0 %    NRBCs per 100 WBC 0 <1 /100    Absolute Neutrophils 4.8 1.6 - 8.3 10e3/uL    Absolute Lymphocytes 2.4 0.8 - 5.3 10e3/uL    Absolute Monocytes 0.9 0.0 - 1.3 10e3/uL    Absolute Eosinophils 0.2 0.0 - 0.7 10e3/uL    Absolute Basophils 0.1 0.0 - 0.2 10e3/uL    Absolute Immature Granulocytes 0.0 <=0.4 10e3/uL    Absolute NRBCs 0.0 10e3/uL   Troponin T, High Sensitivity   Result Value Ref Range    Troponin T, High Sensitivity <6 <=22 ng/L   CRP inflammation   Result Value Ref Range    CRP Inflammation <3.00 <5.00 mg/L   Hemoglobin A1c   Result Value Ref Range    Estimated Average Glucose 117 (H) <117 mg/dL    Hemoglobin A1C 5.7 (H) <5.7 %   TSH with free T4 reflex   Result Value Ref Range    TSH 2.55 0.30 - 4.20 uIU/mL   Magnesium   Result Value Ref Range    Magnesium 1.8 1.7 - 2.3 mg/dL   EKG 12-lead, tracing only   Result Value Ref Range    Systolic Blood Pressure  mmHg    Diastolic Blood Pressure  mmHg    Ventricular Rate 54 BPM    Atrial Rate 54 BPM    WV Interval 174 ms    QRS Duration 76 ms     ms    QTc 379 ms    P Axis 58 degrees    R AXIS 29 degrees    T Axis 39 degrees    Interpretation ECG       Sinus bradycardia  Low voltage QRS  Borderline ECG  When compared with ECG of 16-Dec-2020 14:17,  Non-specific change in ST segment in Inferior leads  T wave inversion no longer evident in Inferior leads  Nonspecific T wave abnormality no longer evident in Lateral leads  QT has shortened  Confirmed by GENERATED REPORT, COMPUTER ([id number]),  Jenna Pastrana ([id number]) on 7/8/2025 8:53:50 AM     CT Aortic Survey w Contrast    Narrative    EXAM: CT AORTIC SURVEY W CONTRAST  LOCATION: [facility]  DATE: 7/8/2025    INDICATION: lower abdominal pain, flank pain. also has left shoulder pain. hx of ulcerative colitis. rule out dissection. but also concerned for  ureteral stone, colitis diverticulitis  COMPARISON: CT abdomen/pelvis from 3/21/2022.  TECHNIQUE: CT angiogram chest abdomen pelvis during arterial phase of injection of IV contrast. 2D and 3D MIP reconstructions were performed by the CT technologist. Dose reduction techniques were used.   CONTRAST: 72 mL of Isovue-370.    FINDINGS:   CT ANGIOGRAM CHEST, ABDOMEN, AND PELVIS: No hyperdense acute aortic intramural hematoma on the noncontrast series. Normal caliber thoracoabdominal aorta without atherosclerotic calcification. After the administration of IV contrast, there is no evidence   of dissection. Three-vessel aortic arch with patent arch vessels. The abdominal aortic branch vessels are patent. The iliac and proximal femoral arteries are patent. No central pulmonary embolism.    LUNGS AND PLEURA: No acute airspace disease. No interstitial edema. No pneumothorax or pleural effusion.    MEDIASTINUM/AXILLAE: Normal heart size. No pericardial effusion.    CORONARY ARTERY CALCIFICATION: None.    HEPATOBILIARY: Normal.    PANCREAS: Normal.    SPLEEN: There is some low density fluid adjacent to the spleen, though the spleen is within normal limits for arterial phase appearance.    ADRENAL GLANDS: Normal.    KIDNEYS/BLADDER: Normal.    BOWEL: Fluid-filled stomach. Fluid-filled loops of small bowel throughout the abdomen, with borderline distended loops of small bowel in the pelvis. There is gradual narrowing of bowel caliber distally. Mild to moderate wall thickening of the proximal   jejunum as seen on image 59 of series 5. No free air. Normal appendix.    LYMPH NODES: Normal.    PELVIC ORGANS: Small amount of free fluid. Fat-containing left inguinal hernia.    MUSCULOSKELETAL: Normal.      Impression    IMPRESSION:  1.  Findings consistent with a nonspecific proximal enteritis.    2.  Fluid-filled loops of small bowel throughout the abdomen, borderline distended in the pelvis with gradual narrowing of distal small  bowel caliber. Developing obstructive process not excluded.    3.  No aortic aneurysm or dissection.     Lactic acid whole blood   Result Value Ref Range    Lactic Acid 1.6 0.7 - 2.0 mmol/L   Troponin T, High Sensitivity   Result Value Ref Range    Troponin T, High Sensitivity <6 <=22 ng/L   EKG 12-lead, tracing only   Result Value Ref Range    Systolic Blood Pressure  mmHg    Diastolic Blood Pressure  mmHg    Ventricular Rate 110 BPM    Atrial Rate 110 BPM    DC Interval 140 ms    QRS Duration 80 ms     ms    QTc 443 ms    P Axis 41 degrees    R AXIS 25 degrees    T Axis 57 degrees    Interpretation ECG       Sinus tachycardia  Possible Left atrial enlargement  Septal infarct , age undetermined  Abnormal ECG  When compared with ECG of 08-Jul-2025 05:37,  Vent. rate has increased by  56 bpm  Confirmed by Dave Randolph ([id number]) on 7/8/2025 1:53:41 PM     UA with Microscopic reflex to Culture    Specimen: Urine, Clean Catch   Result Value Ref Range    Color Urine Yellow Colorless, Straw, Light Yellow, Yellow    Appearance Urine Clear Clear    Glucose Urine 70 (A) Negative mg/dL    Bilirubin Urine Negative Negative    Ketones Urine 10 (A) Negative mg/dL    Specific Gravity Urine 1.020 1.003 - 1.035    Blood Urine Trace (A) Negative    pH Urine 5.5 5.0 - 7.0    Protein Albumin Urine 30 (A) Negative mg/dL    Urobilinogen Urine Normal Normal mg/dL    Nitrite Urine Negative Negative    Leukocyte Esterase Urine Negative Negative    Mucus Urine Present (A) None Seen /LPF    RBC Urine 1 <=2 /HPF    WBC Urine 1 <=5 /HPF    Hyaline Casts Urine 13 (H) <=2 /LPF    Narrative    Urine Culture not indicated   Blood gas venous   Result Value Ref Range    pH Venous 7.36 7.32 - 7.43    pCO2 Venous 43 40 - 50 mm Hg    pO2 Venous 56 (H) 25 - 47 mm Hg    Bicarbonate Venous 25 21 - 28 mmol/L    Base Excess/Deficit Venous -1.2 -3.0 - 3.0 mmol/L    FIO2 3     Oxyhemoglobin Venous 89 (H) 70 - 75 %    O2 Sat, Venous 90.4 (H) 70.0 - 75.0  %    Narrative    In healthy individuals, oxyhemoglobin (O2Hb) and oxygen saturation (SO2) are approximately equal. In the presence of dyshemoglobins, oxyhemoglobin can be considerably lower than oxygen saturation.   Lactic Acid Whole Blood with 1X Repeat in 2 HR when >2   Result Value Ref Range    Lactic Acid, Initial 1.4 0.7 - 2.0 mmol/L   Ionized Calcium   Result Value Ref Range    Calcium Ionized Whole Blood 4.3 (L) 4.4 - 5.2 mg/dL   Glucose by meter   Result Value Ref Range    GLUCOSE BY METER POCT 140 (H) 70 - 99 mg/dL     COVID-19 PCR Results           No data to display              COVID-19 Antibody Results, Testing for Immunity           No data to display                Time Spent on this Encounter   I spent 60 minutes of critical care time on the unit/floor managing the care of Simone Johnson. Upon evaluation, this patient had a high probability of imminent or life-threatening deterioration due to an acute abdomen, which required my direct attention, intervention, and personal management. 100% of my time was spent at the bedside counseling the patient and/or coordinating care regarding services listed in this note.      CHERYLE Reddy, CNP  Hospitalist - [facility]  Text me on the [facility] for a textback

## 2025-07-09 NOTE — PLAN OF CARE
Pt is alert and oriented x4.  Pt denies nausea, denies dizziness. Pt put out 675 ml from NG tube, bile/brown in color. Pt up to chair with assist of one and tolerated well. PT with adequate urine output. Pt afebrile, sinus rhythm/sinus tachycardia. Pt JANNET with serosang drainage. Abdominal binder in place. Pt using dilaudid for pain relief. Pt rates pain 3-4/10 at rest with analgesia. Pt and pt wife updated on plan of care.          Problem: Adult Inpatient Plan of Care  Goal: Optimal Comfort and Wellbeing  Outcome: Progressing

## 2025-07-09 NOTE — PROVIDER NOTIFICATION
Notified Dr. Langley via messaging of patient with increased NG output. Output is bile/dark brown. Pt put out 675 ml over last 8 hours, 275 from 2p-3pm. Pt denies dizziness at rest, small amt with ambulation. Pt NPO. Pt pain well-controlled. VSS.

## 2025-07-09 NOTE — PROGRESS NOTES
"Surgery    Sore, but feeling much better this morning  No bowel function yet  Tolerating NG tube ok.   Denies CP, dyspnea    Gen:  Awake, Alert, uncomfortable. Pleasant and conversational.  /82   Pulse 101   Temp 98.1  F (36.7  C) (Oral)   Resp 19   Ht 1.778 m (5' 10\")   Wt 101.1 kg (222 lb 14.2 oz)   SpO2 94%   BMI 31.98 kg/m    Resp - Non-labored, clear to ascultation.    Cardiac - tachycardic. without murmur  Abdomen - soft, appropriately tender. Dressings with scant drainage. Drain site ok. Drain output: 70cc. Serosanguinous.  Extremities - no lower extremity edema or tenderness with palpation    Wbc 6.1    A/P This patient is a 50 year old man with a significant past medical history of perforated duodenal ulcer and ventral hernia repair with mesh who presents with diffuse abdominal pain. He had nonspecific enteritis with loops of mildly dilated small bowel. Later on 7/8, an RRT was called for intractable abdominal pain. Repeat CT was obtained and definitively evidence of perforated viscus. Emergent exploratory laparotomy was performed with abdominal lavage and primary repair of duodenal jejunal perforation on 7/9/25.      - continue npo for several days to protect bowel repair.  - ppi bid  - lovenox tonight.  - zosyn q6hr  - encourage incentive spirometer use  - abdominal binder  - ambulate  - discussed op findings in detail with patient and guest (wife)  - patient reports diarrhea for ~5yrs which is typically managed with imodium. He has had several visits to the ED for dehydration 2  to intractable diarrhea. He follows with Dr. Cadet.     Rosales White PA-C  Office: [phone]  Pager: [phone]    "

## 2025-07-09 NOTE — ANESTHESIA PROCEDURE NOTES
Airway       Patient location during procedure: OR       Procedure Start/Stop Times: 7/9/2025 2:21 AM  Staff -        CRNA: Daniel King Crna, CHERYLE CRNA       Performed By: CRNA  Consent for Airway        Urgency: emergent  Indications and Patient Condition       Indications for airway management: ebony-procedural       Induction type:intravenous       Mask difficulty assessment: 0 - not attempted    Final Airway Details       Final airway type: endotracheal airway       Successful airway: ETT - single  Endotracheal Airway Details        ETT size (mm): 8.0       Cuffed: yes       Cuff volume (mL): 8       Successful intubation technique: direct laryngoscopy       DL Blade Type: Madsen 2       Grade View of Cords: 1       Adjucts: stylet       Position: Right       Measured from: lips       Secured at (cm): 23       Bite block used: None    Post intubation assessment        Placement verified by: capnometry, equal breath sounds and chest rise        Number of attempts at approach: 1       Number of other approaches attempted: 0       Secured with: tape       Ease of procedure: easy       Dentition: Unchanged    Medication(s) Administered   Medication Administration Time: 7/9/2025 2:21 AM

## 2025-07-09 NOTE — PROGRESS NOTES
DATE & SHIFT: 7/8/25. [id number]  PRIMARY Concern: abdominal pain  SAFETY RISK Concerns (fall risk, behaviors, etc.): none      Aggression Tool Color: Red.  Isolation/Type: enteric precaution, Tests/Procedures for NEXT shift: CT abdomen/pelvis, NG tube for decompression,  Consults? (Pending/following, signed-off?) Gen surg, Gastroenterology   Where is patient from? (Home, TCU, etc.): home  Other Important info for NEXT shift: CT of abdomen showed findings consistent with proximal enteritis, as well as fluid-filled loops of small bowel throughout the abdomen, borderline distended in the pelvis with gradual narrowing of distal small bowel caliber. Developing obstructive process not excluded. Pain was not controlled. In house MD was notified. MD accessed and placed Stat order.  Anticipated DC date & active delays: TBD  _____________________________________________________________________________  SUMMARY NOTE:   Orientation/Cognitive: AOX4. Anxious.   Observation Goals (Met/ Not Met): not met  Mobility Level/Assist Equipment: ind.   Antibiotics & Plan (IV/po, length of tx left): none  Pain Management: IV dilaudid, Oxy, tylenol and  Atarax available PRN.  Complete Pain Reassessment: Y/N Y Due next: shift  Tele/VS/O2: Sinus tachy  ABNL Lab/BG: calcium ionized 4.3, Glucose 140.  Diet: NPO except Ice chips.  Bowel/Bladder: continent.  Skin Concerns: looks fine.  Drains/Devices: PIV NS infusing at 100 cr/hr  Patient Stated Goal for Today: pain management.

## 2025-07-09 NOTE — PLAN OF CARE
Goal Outcome Evaluation:       Shift: [id number]    Summary: POD# 0 exploratory laparotomy, repair of jejunal perforation   Cognition: A&O x4. Drowsy from sedation.Arouses to voice.  Neuro- Intact  Most Recent Vitals- Temp: 98.4  F (36.9  C) Temp src: Temporal BP: 114/81 Pulse: 105   Resp: 20 SpO2: 93 % O2 Device: Nasal cannula   Tele/Cardiac- ST  Resp- LS-clear, shallow breaths  Activity- Not OOB, ind before surgery  Pain- Schedule meds given   Drips- PIV infusing NS @100 ml/hr  Drains/Tubes- NG on LIS with green drainage., JANNET,& Yates  Skin- Abd surgical incision(scant drainage-marked), JANNET drain site.  GI/- Cont bowel. -gas,  Aggression Color- Green  Plan- Pending recovery  Misc-     Chuy Saucedo RN

## 2025-07-09 NOTE — PROGRESS NOTES
[facility]  Hospitalist Progress Note   07/09/2025          Assessment and Plan:       Simone Johnson is a 50 year old  male with medical history of history of ulcerative colitis (details not available in epic), history of contained duodenal ulcer admitted on 7/8/2025 with abdominal pain, vomiting, diarrhea.     Status post emergent exploratory laparotomy 7/9/2025  Status post proximal jejunum perforation with small volume pneumoperitoneum.  Acute abdominal pain likely from enteritis, early small bowel obstruction on admission.  --Reported diffuse abdominal pain, multiple episodes of loose watery diarrhea, vomiting nonbloody.    Reports little relief of pain with pantoprazole, dicyclomine, Zofran at home prompting ER evaluation.  -seen in ED on 7/6 for nausea, vomiting, diarrhea.  In ED WBC 13.5, electrolytes within normal limits, lipase 374.  Patient had received 1 L fluid bolus, IV Zofran with which symptoms improved, discharged home with close follow-up.    -WBC 8.4.  CRP less than 3.  Lipase 259.  UA No concerns for infection. Sodium 136, creatinine 1.01, potassium 3.6.  Liver enzymes within normal limits.   -CT aortic survey 7/8 consistent with a nonspecific proximal enteritis. fluid-filled loops of small bowel throughout the abdomen, borderline distended in the pelvis with gradual narrowing of distal small bowel caliber. Developing obstructive process not excluded.  ---Patient was admitted to the observation unit.  n.p.o., IV fluids, IV pantoprazole, stool studies ordered.  Received IV/p.o. as needed pain meds for pain control, general surgery, [facility] gastroenterology consulted.  Recommended bowel rest, conservative management.  --- On 7/8 night patient had RRT for worsening abdominal pain. Stat CT imaging with New perforation of the severely inflamed proximal jejunum in the left upper quadrant with small volume pneumoperitoneum, regional peritonitis, and increased volume of ascites.  --  Underwent emergent exploratory laparotomy with repair of duodenal jejunal perforation and abdominal lavage with drain placement on 7/9 early a.m.  1.5 L of succus throughout the abdomen.   -- Will defer postoperative care including pharmacological DVT prophylaxis to surgical team.  Appreciate general surgery comanagement.  [facility] following, appreciate input.  --Continue n.p.o. status.  Continue IV fluids.  Continue IV PPI twice daily.  Continue IV Zosyn every 6 hours [initiated 7/9 early a.m.].  As needed antiemetics.  IV as needed pain meds.    Sinus tachycardia.  Acute hypoxic respiratory failure -multifactorial secondary to pain, narcotic use, underlying CHACHA.  Left shoulder pain improved   Elevated blood pressures without diagnosis of hypertension - improving   Report of shortness of breath, noted hypoxia.  EKG on admission sinus bradycardia, heart rate 54.  No acute ischemic changes.  Repeat EKG showing sinus tachycardia.  Troponin high-sensitivity 6 < 6.  TSH, magnesium within normal limits.  CT aortic survey from ED-no central pulmonary embolism.  --Patient has been receiving intravenous antibiotics, history of CHACHA.  Continue supplemental nasal oxygen, wean as tolerated.  Continue telemetry monitoring overnight.  Aggressive incentive spirometry.  As needed IV hydralazine ordered.     Hyperglycemia, prediabetes with a hemoglobin A1c of 5.7.  Monitor blood sugars periodically.  If elevated will consider sliding scale insulin.    Obesity with a BMI of 31.98.  Increase all-cause mortality and morbidity.  Consider lifestyle modification with diet and exercise as able to.          Clinically Significant Risk Factors Present on Admission           # Hypocalcemia: Lowest iCa = 4.3 mg/dL in last 2 days, will monitor and replace as appropriate     # Hypoalbuminemia: Lowest albumin = 2.8 g/dL at 7/9/2025 11:10 AM, will monitor as appropriate               # Obesity: Estimated body mass index is 31.98 kg/m  as  "calculated from the following:    Height as of this encounter: 1.778 m (5' 10\").    Weight as of this encounter: 101.1 kg (222 lb 14.2 oz).            Orders Placed This Encounter      NPO for Procedure/Surgery per Anesthesia Guidelines Except for: Meds; Clear liquids before procedure/surgery: ADULT (Age GREATER than or Equal to 18 years) - Clear liquids 2 hours before procedure/surgery      DVT Prophylaxis: SCDs, pharmacological DVT prophylaxis per surgical team.  Code Status: Full Code  Disposition: Expected discharge in greater than 2 days.    Medically Ready for Discharge: Anticipated in 2-4 Days     Discussed with patient, his wife by the bedside, bedside RN  Total time greater than 51 minutes.  More than 70% of time spent in direct patient care, care coordination, patient counseling, and formalizing plan of care.      Stevo Langley MD        Interval History:        Patient lying in bed.  Denies any chest pain or palpitations.  Denies any headache or dizziness.  No nausea.  Having NG tube, coffee-ground emesis.  Reports abdominal pain, some relief with pain meds.  Denies any new back pain.  Denies any new tingling or numbness.  Having Yates catheter, draining clear urine.  Afebrile, Tmax 99.1.  Has been n.p.o., receiving IV fluids.  On intravenous pain meds, intravenous Zosyn.    Last night patient had worsening abdominal pain, CT imaging with Perforation now present in the proximal jejunum in the left upper quadrant with small volume pneumoperitoneum compared to prior CT imaging.  Went emergent exploratory laparotomy this morning.           Physical Exam:        Physical Exam   Temp:  [97.2  F (36.2  C)-99.1  F (37.3  C)] 98.1  F (36.7  C)  Pulse:  [] 106  Resp:  [16-24] 19  BP: (101-153)/() 121/84  SpO2:  [91 %-98 %] 93 %    Intake/Output Summary (Last 24 hours) at 7/9/2025 1502  Last data filed at 7/9/2025 1400  Gross per 24 hour   Intake 2450 ml   Output 2065 ml   Net 385 ml       Admission " Weight: 97.5 kg (215 lb)  Current Weight: 101.1 kg (222 lb 14.2 oz)    PHYSICAL EXAM  GENERAL: Patient is in no distress. Alert and oriented.  HEENT: Oropharynx pink, NG tube +   HEART: Regular rate and rhythm. S1S2.   LUNGS: Clear to auscultation bilaterally. No expiratory wheeze.  Respirations unlabored  ABDOMEN: Abdominal binder present.  JANNET drain present.  NEURO: Moving all extremities.  EXTREMITIES: No pedal edema.   SKIN: Warm, dry.  PSYCHIATRY Cooperative       Medications:        Current Facility-Administered Medications   Medication Dose Route Frequency Provider Last Rate Last Admin    pantoprazole (PROTONIX) injection 40 mg  40 mg Intravenous BID AC Rosales White PA-C   40 mg at 07/09/25 0633    piperacillin-tazobactam (ZOSYN) 4.5 g vial to attach to  mL bag  4.5 g Intravenous Q6H Rosales White PA-C   4.5 g at 07/09/25 1216    sodium chloride (PF) 0.9% PF flush 3 mL  3 mL Intracatheter Q8H OTIS Rosales White PA-C   3 mL at 07/09/25 0527     Current Facility-Administered Medications   Medication Dose Route Frequency Provider Last Rate Last Admin    acetaminophen (TYLENOL) Suppository 650 mg  650 mg Rectal Q4H PRN Rosales White PA-C        hydrALAZINE (APRESOLINE) injection 10 mg  10 mg Intravenous Q4H PRN Rosales White PA-C        HYDROmorphone (PF) (DILAUDID) injection 0.3 mg  0.3 mg Intravenous Q2H PRN Rosales White PA-C   0.3 mg at 07/09/25 0810    HYDROmorphone (PF) (DILAUDID) injection 0.5 mg  0.5 mg Intravenous Q2H PRN Rosales White PA-C   0.5 mg at 07/09/25 1229    lidocaine (LMX4) cream   Topical Q1H PRN Rosales White PA-C        lidocaine 1 % 0.1-1 mL  0.1-1 mL Other Q1H PRN Rosales White PA-C        naloxone (NARCAN) injection 0.2 mg  0.2 mg Intravenous Q2 Min PRN Rosales White PA-C        Or    naloxone (NARCAN) injection 0.4 mg  0.4 mg Intravenous Q2 Min PRN Rosales White PA-C        Or    naloxone (NARCAN) injection 0.2 mg  0.2 mg Intramuscular Q2 Min PRN  Rosales White PA-C        Or    naloxone (NARCAN) injection 0.4 mg  0.4 mg Intramuscular Q2 Min PRN Rosales White PA-C        ondansetron (ZOFRAN) injection 4 mg  4 mg Intravenous Q6H PRN Rosales White PA-C        Or    ondansetron (ZOFRAN ODT) ODT tab 4 mg  4 mg Oral Q6H PRN Rosales White PA-C        prochlorperazine (COMPAZINE) injection 10 mg  10 mg Intravenous Q6H PRN Rosales White PA-C        sodium chloride (PF) 0.9% PF flush 3 mL  3 mL Intracatheter q1 min prn Rosales White PA-C                Data:      All new lab and imaging data was reviewed.

## 2025-07-09 NOTE — ANESTHESIA PREPROCEDURE EVALUATION
Anesthesia Pre-Procedure Evaluation    Patient: Simone Johnson   MRN: [id number] : 1975          Procedure : Procedure(s):  exploratory laparotomy, possible bowel resection, possible temporary abdominal closure         Past Medical History:   Diagnosis Date    Ulcerative colitis (H)       Past Surgical History:   Procedure Laterality Date    HERNIA REPAIR        Allergies   Allergen Reactions    Amoxicillin      Tolerates cephalosporins (ceftriaxone and cefdinir)      Social History     Tobacco Use    Smoking status: Never    Smokeless tobacco: Never   Substance Use Topics    Alcohol use: Yes     Alcohol/week: 3.0 standard drinks of alcohol     Types: 3 Standard drinks or equivalent per week     Comment: couple beers per week      Wt Readings from Last 1 Encounters:   25 101.1 kg (222 lb 14.2 oz)        Anesthesia Evaluation            ROS/MED HX  ENT/Pulmonary:     (+)     CHACHA risk factors,   obese,                                Neurologic:  - neg neurologic ROS     Cardiovascular:  - neg cardiovascular ROS     METS/Exercise Tolerance:     Hematologic:  - neg hematologic  ROS     Musculoskeletal:       GI/Hepatic: Comment: H/o ulcerative colitis.   Duodenal ulcer.   Acute perforated bowel.       (+)       Inflammatory bowel disease,             Renal/Genitourinary:  - neg Renal ROS     Endo:  - neg endo ROS     Psychiatric/Substance Use:  - neg psychiatric ROS     Infectious Disease:       Malignancy:       Other:              Physical Exam  Airway  Mallampati: II  TM distance: >3 FB  Neck ROM: full    Cardiovascular - normal exam   Dental   (+) Minor Abnormalities - some fillings, tiny chips      Pulmonary - normal exam      Neurological - normal exam  He appears awake, alert and oriented x3.    Other Findings       OUTSIDE LABS:  CBC:   Lab Results   Component Value Date    WBC 8.9 2025    WBC 8.4 2025    HGB 16.9 2025    HGB 15.7 2025    HCT 50.6 2025    HCT 47.2  "07/08/2025     07/08/2025     07/08/2025     BMP:   Lab Results   Component Value Date     07/08/2025     07/08/2025    POTASSIUM 4.0 07/08/2025    POTASSIUM 3.6 07/08/2025    CHLORIDE 101 07/08/2025    CHLORIDE 98 07/08/2025    CO2 20 (L) 07/08/2025    CO2 25 07/08/2025    BUN 6.5 07/08/2025    BUN 5.6 (L) 07/08/2025    CR 0.86 07/08/2025    CR 1.01 07/08/2025     (H) 07/08/2025     (H) 07/08/2025     COAGS:   Lab Results   Component Value Date    INR 1.10 12/22/2024     POC: No results found for: \"BGM\", \"HCG\", \"HCGS\"  HEPATIC:   Lab Results   Component Value Date    ALBUMIN 3.6 07/08/2025    PROTTOTAL 6.4 07/08/2025    ALT 12 07/08/2025    AST 17 07/08/2025    ALKPHOS 56 07/08/2025    BILITOTAL 0.6 07/08/2025     OTHER:   Lab Results   Component Value Date    LACT 1.4 07/08/2025    A1C 5.7 (H) 07/08/2025    NICK 8.5 (L) 07/08/2025    MAG 1.8 07/08/2025    LIPASE 259 (H) 07/08/2025    TSH 2.55 07/08/2025    CRP <2.9 03/31/2022    SED 36 (H) 12/16/2020       Anesthesia Plan    ASA Status:  2, emergent      NPO Status: ELEVATED Aspiration Risk/Unknown   Anesthesia Type: General.  Airway: oral.  Induction: intravenous, rapid sequence.  Maintenance: Inhalation.   Techniques and Equipment:       - Monitoring Plan: standard ASA monitoring     Consents    Anesthesia Plan(s) and associated risks, benefits, and realistic alternatives discussed. Questions answered and patient/representative(s) expressed understanding.     - Discussed: CRNA     - Discussed with:  Patient               Postoperative Care    Pain management: multimodal analgesia.     Comments:                   Nigel Eli, DO, DO    I have reviewed the pertinent notes and labs in the chart from the past 30 days and (re)examined the patient.  Any updates or changes from those notes are reflected in this note.    Clinically Significant Risk Factors Present on Admission           # Hypocalcemia: Lowest Ca = 8.5 " "mg/dL in last 2 days, will monitor and replace as appropriate                   # Obesity: Estimated body mass index is 31.98 kg/m  as calculated from the following:    Height as of this encounter: 1.778 m (5' 10\").    Weight as of this encounter: 101.1 kg (222 lb 14.2 oz).                    "

## 2025-07-09 NOTE — PROGRESS NOTES
"Surgery Note    Pt reports pain improved from prior to surgery. No flatus. Tolerating NGT fine. NGT output is dark.     O:/84   Pulse 106   Temp 98.1  F (36.7  C) (Oral)   Resp 19   Ht 1.778 m (5' 10\")   Wt 101.1 kg (222 lb 14.2 oz)   SpO2 93%   BMI 31.98 kg/m    Abd: soft, incision with dressings is clean and dry. JANNET drain is serosanguinous    A/P; No is a 50yom POD 1 s/p ex lap with repair of perforated duodenojejunal ulcer and abdominal washout/drain placement. Unclear etiology of ulcer and rare location at the ligament of treitz. Gastrin lab level ordered to rule out gastrinoma and chromogranin A.     Will recheck CBC/BMP tonight with increasing NGT outputs. Wait on DVT ppx. Continue zosyn. Plan for gastrograffin UGI to evaluate for leak on Monday. Continue JANNET drain.     Ammy Dang MD  [facility]  [phone]    " - Patient presented with  elevated creatinine level 1.51 , baseline unknown but patient mentioned she know she had kidney problem - Patient presented with  elevated creatinine level 1.51 ,   - Baseline creatine - 1.5. as per records from [facility].

## 2025-07-09 NOTE — PROGRESS NOTES
"Surgery Note    I was called by CHERYLE Shepherd emergently overnight due to a new abdominal CT showing concern for bowel perforation with free air and free fluid in patient with a rigid abdomen.    Patient reports severe abdominal pain.  NG tube in place with 1 L of bilious appearing fluid in canister.  Denies any flatus or bowel movements throughout the day.  Reports prior laparoscopic ventral hernia repair and no other abdominal surgeries.  Notes he has had a prior duodenal ulcer which was treated with PPIs no need for surgery.  Discussed history of possible ulcerative colitis and he is ultimately not sure about this diagnosis.  No prior colon surgery.  Colonoscopies have been normal in the past.    O: BP (!) 144/102 (BP Location: Right arm, Patient Position: Semi-Ga's, Cuff Size: Adult Regular)   Pulse (!) 127   Temp 98.4  F (36.9  C) (Oral)   Resp 20   Ht 1.778 m (5' 10\")   Wt 101.1 kg (222 lb 14.2 oz)   SpO2 (!) 91%   BMI 31.98 kg/m    Abd: Distended and rigid with rebound and guarding throughout, diffuse peritonitis    A/P: Britton is a 50-year-old male who presented with concern for enteritis and new CT reveals concern for perforated bowel.  He is tachycardic and hypertensive.  We discussed he would likely require central line placement and concern for sepsis.  We discussed this is an emergent problem that will require surgery for repair.  We discussed plan for exploratory laparotomy possible bowel resection and possible temporary abdominal closure pending intraoperative findings.  We discussed if I need to leave him in a temporary abdominal closure he will be intubated and sedated in the ICU and return to the operating room in a day or so for repeat laparotomy and abdominal closure.  We discussed the risks, benefits, indications and alternatives and he agrees to proceed with emergent surgery.  I called his wife, Nikki and left a voicemail stating that Britton will be needing surgery tonight.  I will try " her again after surgery.    Ammy Dang MD  [facility]  [phone]

## 2025-07-09 NOTE — PROGRESS NOTES
[facility]  Gastroenterology Progress Note     Simone Johnson MRN# [id number]   YOB: 1975 Age: 50 year old          Assessment and Plan:     Simone Johnson is a 50 year old  male with medical history of  history of ulcerative colitis (details not available in epic), Duodenal ulcer presented to ED with abdominal pain, vomiting, diarrhea.      Enteritis  Acute abdominal pain  Nausea, vomiting and diarrhea  Perforated bowel  Reports diffuse abdominal pain, multiple episodes of loose watery diarrhea, vomiting nonbloody.  Reports little relief of pain with pantoprazole, dicyclomine, Zofran at home prompting ER evaluation.  CT aortic survey noted nonspecific proximal enteritis, fluid filled loops of small bowel throughout abdomen and borderline distended pelvic- with gradual narrowing of distal small bowel caliber. Developing obstructive process not excluded. NO aortic aneurysm or dissection  Hgb 15.7, WBC 8.4, Lipase minimally elevated at 374>259., CRP <3  Found to have new abdomen CT scan finding of bowel perforation and free air and fluid NG placed. Taken to OR emergently at 4 a.m on 7/9/25    - IVF, pain control, antiemetics, antibiotics  - BID PPI  -Appreciate surgery help and emergent surgical intervention  - GI will follow peripherally                  Interval History:     More comfortable today. Still drowsy from surgery.              Review of Systems:     C: NEGATIVE for fever, chills, change in weight  E/M: NEGATIVE for ear, mouth and throat problems  R: NEGATIVE for significant cough or SOB  CV: NEGATIVE for chest pain, palpitations or peripheral edema             Medications:   I have reviewed this patient's current medications  Current Facility-Administered Medications   Medication Dose Route Frequency Provider Last Rate Last Admin    ketorolac (TORADOL) injection 30 mg  30 mg Intravenous Q6H Rosales White PA-C   30 mg at 07/09/25 0525    pantoprazole  (PROTONIX) injection 40 mg  40 mg Intravenous BID AC Rosales White PA-C   40 mg at 07/09/25 0633    piperacillin-tazobactam (ZOSYN) 4.5 g vial to attach to  mL bag  4.5 g Intravenous Q6H Rosales White PA-C   4.5 g at 07/09/25 0633    sodium chloride (PF) 0.9% PF flush 3 mL  3 mL Intracatheter Q8H [facility] Rosales White PA-C   3 mL at 07/09/25 0527                  Physical Exam:   Vitals were reviewed  Vital Signs with Ranges  Temp:  [97.2  F (36.2  C)-99.6  F (37.6  C)] 98.4  F (36.9  C)  Pulse:  [] 102  Resp:  [16-36] 19  BP: (105-156)/() 105/79  SpO2:  [88 %-98 %] 94 %  I/O last 3 completed shifts:  In: 1000 [I.V.:1000]  Out: 1330 [Urine:550; Emesis/NG output:700; Drains:70; Blood:10]  Constitutional: healthy, alert, and mild distress            Data:   I reviewed the patient's new clinical lab test results.   Recent Labs   Lab Test 07/09/25  0623 07/08/25 2242 07/08/25 0530 07/06/25  1452 12/22/24  1143   WBC 6.1 8.9 8.4 13.5* 12.8*   HGB  --  16.9 15.7 17.5 14.4   MCV 88 88 88 90 89   PLT  --  326 341 380 265   INR  --   --   --   --  1.10     Recent Labs   Lab Test 07/08/25 2242 07/08/25  0530 07/06/25  1452   POTASSIUM 4.0 3.6 4.3   CHLORIDE 101 98 102   CO2 20* 25 22   BUN 6.5 5.6* 12.2   ANIONGAP 15 13 13     Recent Labs   Lab Test 07/08/25 2242 07/08/25  1311 07/08/25  0530 07/06/25  1452 03/31/22  1232 03/21/22  2241   ALBUMIN 3.6  --  4.0 4.6   < > 5.0   BILITOTAL 0.6  --  0.6 0.4   < > 1.0   ALT 12  --  14 21   < > 30   AST 17  --  14 25   < > 16   PROTEIN  --  30*  --   --   --   --    LIPASE  --   --  259* 374*  --  78    < > = values in this interval not displayed.       I reviewed the patient's new imaging results.    All laboratory data reviewed  All imaging studies reviewed by me.    Kathy Cruz PA-C,  7/9/2025  [facility]  Office : [phone]  Cell: [phone] (Dr. Cadet)  Cell: [phone] (Kathy Cruz PA-C)

## 2025-07-09 NOTE — PLAN OF CARE
Goal Outcome Evaluation:  PRIMARY Concern: abdominal pain  SAFETY RISK Concerns (fall risk, behaviors, etc.): none      Aggression Tool Color: Red.  Isolation/Type: enteric precaution, Tests/Procedures for NEXT shift: CT abdomen/pelvis done  NG tube placed   Consults? (Pending/following, signed-off?) Gen surg,   Where is patient from? (Home, TCU, etc.): home  Other Important info for NEXT shift: CT of abdomen  Perforation now present in the proximal jejunum in the left upper quadrant with small volume pneumoperitoneum compared to prior CT imaging.   Provider on unit, pain medications modified, NG tube placed and currently has 600 ml greenish output. IV zosyn administered stat. IV fluids started, Plan for surgery  ASAP  Anticipated DC date & active delays: TBD  _____________________________________________________________________________  SUMMARY NOTE:   Orientation/Cognitive: AOX4. Anxious.   Observation Goals (Met/ Not Met): not met  Mobility Level/Assist Equipment:  SBA 1  Antibiotics & Plan (IV/po, length of tx left): Zosyn q 6 hrs  Pain Management: IV dilaudid,  PRN.  Complete Pain Reassessment: Y/N Y Due next: shift  Tele/VS/O2: Sinus tachy  ABNL Lab/BG:   Diet: NPO   Bowel/Bladder: continent.  Skin Concerns:  No skin issues  Drains/Devices: PIV NS infusing at 100 ml/hr  Patient Stated Goal for Today:  Exploratory Lap now

## 2025-07-09 NOTE — PROVIDER NOTIFICATION
Called by radiologist to discuss CT results. Perforation now present in the proximal jejunum in the left upper quadrant with small volume pneumoperitoneum compared to prior CT imaging.    Plan:  - Start zosyn  - Discussed results with on-call general surgeon Dr. Dang   *OR team called-in for emergency surgical intervention  - Plan for IMC following surgery until stable    Attempted to update wife Nikki but no answer x4. Message left.    CHERYLE Reddy, CNP  Hospitalist - [facility]  Text me on the [employer] [place]aleigh for a textback

## 2025-07-09 NOTE — PROGRESS NOTES
Admission/Transfer from: PACU  2 RN skin assessment completed. Yes with Amilcar Delaney.  Significant findings include: see chart  WOC Nurse Consult Ordered? No

## 2025-07-09 NOTE — ANESTHESIA CARE TRANSFER NOTE
Patient: Simone Johnson    Procedure: Procedure(s):  exploratory laparotomy, with repair of perforated jejunal ulcer       Diagnosis: Perforation of intestine (H) [K63.1]  Diagnosis Additional Information: No value filed.    Anesthesia Type:   General     Note:    Oropharynx: oropharynx clear of all foreign objects and spontaneously breathing  Level of Consciousness: drowsy  Oxygen Supplementation: face mask  Level of Supplemental Oxygen (L/min / FiO2): 6  Independent Airway: airway patency satisfactory and stable  Dentition: dentition unchanged  Vital Signs Stable: post-procedure vital signs reviewed and stable  Report to RN Given: handoff report given  Patient transferred to: PACU    Handoff Report: Identifed the Patient, Identified the Reponsible Provider, Reviewed the pertinent medical history, Discussed the surgical course, Reviewed Intra-OP anesthesia mangement and issues during anesthesia, Set expectations for post-procedure period and Allowed opportunity for questions and acknowledgement of understanding      Vitals:  Vitals Value Taken Time   /102    Temp     Pulse 110    Resp 18    SpO2 100        Electronically Signed By: CHERYLE Felton CRNA  July 9, 2025  4:02 AM

## 2025-07-10 VITALS
OXYGEN SATURATION: 95 % | RESPIRATION RATE: 16 BRPM | SYSTOLIC BLOOD PRESSURE: 125 MMHG | TEMPERATURE: 98.9 F | HEIGHT: 70 IN | HEART RATE: 84 BPM | DIASTOLIC BLOOD PRESSURE: 72 MMHG | BODY MASS INDEX: 30.99 KG/M2 | WEIGHT: 216.49 LBS

## 2025-07-10 LAB
ALBUMIN SERPL BCG-MCNC: 2.7 G/DL (ref 3.5–5.2)
ALP SERPL-CCNC: 53 U/L (ref 40–150)
ALT SERPL W P-5'-P-CCNC: 10 U/L (ref 0–70)
ANION GAP SERPL CALCULATED.3IONS-SCNC: 11 MMOL/L (ref 7–15)
AST SERPL W P-5'-P-CCNC: 16 U/L (ref 0–45)
BILIRUB SERPL-MCNC: 0.6 MG/DL
BUN SERPL-MCNC: 15.6 MG/DL (ref 6–20)
CALCIUM SERPL-MCNC: 8.3 MG/DL (ref 8.8–10.4)
CHLORIDE SERPL-SCNC: 100 MMOL/L (ref 98–107)
CREAT SERPL-MCNC: 1.11 MG/DL (ref 0.67–1.17)
EGFRCR SERPLBLD CKD-EPI 2021: 81 ML/MIN/1.73M2
ERYTHROCYTE [DISTWIDTH] IN BLOOD BY AUTOMATED COUNT: 16.8 % (ref 10–15)
GLUCOSE SERPL-MCNC: 113 MG/DL (ref 70–99)
HCO3 SERPL-SCNC: 29 MMOL/L (ref 22–29)
HCT VFR BLD AUTO: 40.3 % (ref 40–53)
HGB BLD-MCNC: 13.5 G/DL (ref 13.3–17.7)
HGB BLD-MCNC: 14.1 G/DL (ref 13.3–17.7)
MCH RBC QN AUTO: 30.2 PG (ref 26.5–33)
MCHC RBC AUTO-ENTMCNC: 33.5 G/DL (ref 31.5–36.5)
MCV RBC AUTO: 87 FL (ref 78–100)
MCV RBC AUTO: 90 FL (ref 78–100)
PLATELET # BLD AUTO: 252 10E3/UL (ref 150–450)
POTASSIUM SERPL-SCNC: 3.5 MMOL/L (ref 3.4–5.3)
PROT SERPL-MCNC: 5.6 G/DL (ref 6.4–8.3)
RBC # BLD AUTO: 4.47 10E6/UL (ref 4.4–5.9)
SODIUM SERPL-SCNC: 140 MMOL/L (ref 135–145)
WBC # BLD AUTO: 7.8 10E3/UL (ref 4–11)

## 2025-07-10 PROCEDURE — 120N000013 HC R&B IMCU

## 2025-07-10 PROCEDURE — 250N000011 HC RX IP 250 OP 636: Performed by: PHYSICIAN ASSISTANT

## 2025-07-10 PROCEDURE — 82040 ASSAY OF SERUM ALBUMIN: CPT | Performed by: HOSPITALIST

## 2025-07-10 PROCEDURE — 36415 COLL VENOUS BLD VENIPUNCTURE: CPT | Performed by: HOSPITALIST

## 2025-07-10 PROCEDURE — 250N000011 HC RX IP 250 OP 636: Performed by: SURGERY

## 2025-07-10 PROCEDURE — 258N000003 HC RX IP 258 OP 636: Performed by: HOSPITALIST

## 2025-07-10 PROCEDURE — 85014 HEMATOCRIT: CPT | Performed by: SURGERY

## 2025-07-10 PROCEDURE — 99232 SBSQ HOSP IP/OBS MODERATE 35: CPT | Performed by: HOSPITALIST

## 2025-07-10 PROCEDURE — 36415 COLL VENOUS BLD VENIPUNCTURE: CPT | Performed by: SURGERY

## 2025-07-10 PROCEDURE — 258N000003 HC RX IP 258 OP 636: Performed by: SURGERY

## 2025-07-10 PROCEDURE — 250N000013 HC RX MED GY IP 250 OP 250 PS 637: Performed by: SURGERY

## 2025-07-10 PROCEDURE — 85018 HEMOGLOBIN: CPT | Performed by: SURGERY

## 2025-07-10 RX ORDER — HEPARIN SODIUM 5000 [USP'U]/.5ML
5000 INJECTION, SOLUTION INTRAVENOUS; SUBCUTANEOUS EVERY 12 HOURS
Status: DISCONTINUED | OUTPATIENT
Start: 2025-07-10 | End: 2025-07-16 | Stop reason: HOSPADM

## 2025-07-10 RX ORDER — BISACODYL 10 MG
10 SUPPOSITORY, RECTAL RECTAL ONCE
Status: COMPLETED | OUTPATIENT
Start: 2025-07-10 | End: 2025-07-10

## 2025-07-10 RX ADMIN — PANTOPRAZOLE SODIUM 40 MG: 40 INJECTION, POWDER, FOR SOLUTION INTRAVENOUS at 08:40

## 2025-07-10 RX ADMIN — HYDROMORPHONE HYDROCHLORIDE 0.5 MG: 1 INJECTION, SOLUTION INTRAMUSCULAR; INTRAVENOUS; SUBCUTANEOUS at 23:47

## 2025-07-10 RX ADMIN — HYDROMORPHONE HYDROCHLORIDE 0.5 MG: 1 INJECTION, SOLUTION INTRAMUSCULAR; INTRAVENOUS; SUBCUTANEOUS at 12:50

## 2025-07-10 RX ADMIN — HYDROMORPHONE HYDROCHLORIDE 0.5 MG: 1 INJECTION, SOLUTION INTRAMUSCULAR; INTRAVENOUS; SUBCUTANEOUS at 10:40

## 2025-07-10 RX ADMIN — HYDROMORPHONE HYDROCHLORIDE 0.5 MG: 1 INJECTION, SOLUTION INTRAMUSCULAR; INTRAVENOUS; SUBCUTANEOUS at 20:22

## 2025-07-10 RX ADMIN — BISACODYL 10 MG: 10 SUPPOSITORY RECTAL at 08:40

## 2025-07-10 RX ADMIN — HYDROMORPHONE HYDROCHLORIDE 0.5 MG: 1 INJECTION, SOLUTION INTRAMUSCULAR; INTRAVENOUS; SUBCUTANEOUS at 17:34

## 2025-07-10 RX ADMIN — HYDROMORPHONE HYDROCHLORIDE 0.5 MG: 1 INJECTION, SOLUTION INTRAMUSCULAR; INTRAVENOUS; SUBCUTANEOUS at 03:59

## 2025-07-10 RX ADMIN — PIPERACILLIN AND TAZOBACTAM 4.5 G: 4; .5 INJECTION, POWDER, FOR SOLUTION INTRAVENOUS at 08:40

## 2025-07-10 RX ADMIN — HYDROMORPHONE HYDROCHLORIDE 0.5 MG: 1 INJECTION, SOLUTION INTRAMUSCULAR; INTRAVENOUS; SUBCUTANEOUS at 01:33

## 2025-07-10 RX ADMIN — SODIUM CHLORIDE: 900 INJECTION INTRAVENOUS at 18:27

## 2025-07-10 RX ADMIN — PIPERACILLIN AND TAZOBACTAM 4.5 G: 4; .5 INJECTION, POWDER, FOR SOLUTION INTRAVENOUS at 20:22

## 2025-07-10 RX ADMIN — HYDROMORPHONE HYDROCHLORIDE 0.5 MG: 1 INJECTION, SOLUTION INTRAMUSCULAR; INTRAVENOUS; SUBCUTANEOUS at 07:33

## 2025-07-10 RX ADMIN — SODIUM CHLORIDE 500 ML: 0.9 INJECTION, SOLUTION INTRAVENOUS at 07:33

## 2025-07-10 RX ADMIN — SODIUM CHLORIDE: 900 INJECTION INTRAVENOUS at 09:55

## 2025-07-10 RX ADMIN — PIPERACILLIN AND TAZOBACTAM 4.5 G: 4; .5 INJECTION, POWDER, FOR SOLUTION INTRAVENOUS at 03:17

## 2025-07-10 RX ADMIN — PIPERACILLIN AND TAZOBACTAM 4.5 G: 4; .5 INJECTION, POWDER, FOR SOLUTION INTRAVENOUS at 14:13

## 2025-07-10 RX ADMIN — HEPARIN SODIUM 5000 UNITS: 5000 INJECTION, SOLUTION INTRAVENOUS; SUBCUTANEOUS at 17:34

## 2025-07-10 RX ADMIN — PANTOPRAZOLE SODIUM 40 MG: 40 INJECTION, POWDER, FOR SOLUTION INTRAVENOUS at 20:22

## 2025-07-10 ASSESSMENT — ACTIVITIES OF DAILY LIVING (ADL)
ADLS_ACUITY_SCORE: 57
ADLS_ACUITY_SCORE: 55
ADLS_ACUITY_SCORE: 57
ADLS_ACUITY_SCORE: 55
ADLS_ACUITY_SCORE: 57
ADLS_ACUITY_SCORE: 57
ADLS_ACUITY_SCORE: 55
ADLS_ACUITY_SCORE: 57
ADLS_ACUITY_SCORE: 57
ADLS_ACUITY_SCORE: 55
ADLS_ACUITY_SCORE: 57
ADLS_ACUITY_SCORE: 55

## 2025-07-10 NOTE — PROGRESS NOTES
[facility]  Hospitalist Progress Note   07/10/2025          Assessment and Plan:       Simone Johnson is a 50 year old  male with medical history of history of ulcerative colitis (details not available in epic), history of contained duodenal ulcer admitted on 7/8/2025 with abdominal pain, vomiting, diarrhea.    -seen in ED on 7/6 for nausea, vomiting, diarrhea.  In ED WBC 13.5, electrolytes within normal limits, lipase 374.  Patient had received 1 L fluid bolus, IV Zofran with which symptoms improved, discharged home with close follow-up.       Status post emergent exploratory laparotomy with repair of duodenal jejunal perforation and abdominal lavage with drain placement 7/9/2025  Status post proximal jejunum perforation with small volume pneumoperitoneum.  Acute abdominal pain likely from enteritis, early small bowel obstruction on admission.  --Reported diffuse abdominal pain, multiple episodes of loose watery diarrhea, vomiting nonbloody.  Little relief of pain with pantoprazole, dicyclomine, Zofran at home prompting ER evaluation.  -WBC 8.4.  CRP less than 3.  Lipase 259.  UA No concerns for infection. Sodium 136, creatinine 1.01, potassium 3.6.  Liver enzymes within normal limits.   -CT aortic survey 7/8 consistent with a nonspecific proximal enteritis. fluid-filled loops of small bowel throughout the abdomen, borderline distended in the pelvis with gradual narrowing of distal small bowel caliber. Developing obstructive process not excluded.  ---Patient was admitted to the observation unit.  GI, general surgery followed.  Recommended bowel rest, conservative management.  N.p.o., IV fluids, IV pantoprazole, stool studies ordered.   --- On 7/8 night patient had RRT for worsening abdominal pain. Stat CT imaging with New perforation of the severely inflamed proximal jejunum in the left upper quadrant with small volume pneumoperitoneum, regional peritonitis, and increased volume of ascites.  --  Underwent emergent exploratory laparotomy with repair of duodenal jejunal perforation and abdominal lavage with drain placement on 7/9   1.5 L of succus throughout the abdomen.   --7/10 patient continues to have coffee-ground colored output from NG.  JANNET drain with serosanguineous output.  Not passing gas, Yates catheter in place  -- Will defer postoperative care including pharmacological DVT prophylaxis to surgical team.  Appreciate general surgery comanagement.  [facility] following, appreciate input.  NG tube to low intermittent suction.  Closely monitor output.  Continue NPO.  Continue IV fluids.  Continue IV PPI twice daily.  Continue IV Zosyn every 6 hours [7/9].  As needed antiemetics.  IV as needed pain meds.  Yates Catheter removal per surgical team.  Courage ambulation.    Sinus tachycardia -improving.  Acute hypoxic respiratory failure -multifactorial secondary to pain, narcotic use, underlying CHACHA.  Left shoulder pain improved   Elevated blood pressures without diagnosis of hypertension - improving   Report of shortness of breath, noted hypoxia.  EKG on admission sinus bradycardia, heart rate 54.  No acute ischemic changes.  Repeat EKG showing sinus tachycardia.  Troponin high-sensitivity 6 < 6.  TSH, magnesium within normal limits.  CT aortic survey from ED-no central pulmonary embolism.  --Patient has been receiving intravenous antibiotics, history of CHACHA.  Some improvement in tachycardia, continues to require oxygen  Continue supplemental nasal oxygen, wean as tolerated.  Continue telemetry monitoring overnight.  Aggressive incentive spirometry.  As needed IV hydralazine ordered.     Hyperglycemia, prediabetes with a hemoglobin A1c of 5.7.  Monitor blood sugars, if elevated will consider sliding scale insulin.    Obesity with a BMI of 31.98.  Increase all-cause mortality and morbidity.  Consider lifestyle modification with diet and exercise as able to.    Hypoalbuminemia likely dilutional, acute  "illness.  Hypocalcemia likely dilutional.  Monitor.          Clinically Significant Risk Factors           # Hypocalcemia: Lowest iCa = 4.3 mg/dL in last 2 days, will monitor and replace as appropriate     # Hypoalbuminemia: Lowest albumin = 2.7 g/dL at 7/10/2025  5:25 AM, will monitor as appropriate                # Obesity: Estimated body mass index is 31.06 kg/m  as calculated from the following:    Height as of this encounter: 1.778 m (5' 10\").    Weight as of this encounter: 98.2 kg (216 lb 7.9 oz)., PRESENT ON ADMISSION          Orders Placed This Encounter      NPO for Medical/Clinical Reasons Except for: Meds      DVT Prophylaxis: SCDs, pharmacological DVT prophylaxis per surgical team.  Code Status: Full Code  Disposition: Expected discharge in greater than 2 days.    Medically Ready for Discharge: Anticipated in 2-4 Days     Discussed with patient, his wife by the bedside, bedside RN  More than 70% of time spent in direct patient care, care coordination, patient counseling, and formalizing plan of care.      Stevo Langley MD        Interval History:        Patient lying in bed.  Denies any chest pain or palpitations.  Denies any headache or dizziness.  No nausea.  Having NG tube, coffee-ground emesis.  Reports abdominal pain, some relief with pain meds.  Denies any new back pain.  Denies any new tingling or numbness.  Having Yates catheter, draining clear urine.  Afebrile  Has been n.p.o., receiving IV fluids.  On intravenous pain meds, intravenous Zosyn.         Physical Exam:        Physical Exam   Temp:  [98.1  F (36.7  C)-98.7  F (37.1  C)] 98.2  F (36.8  C)  Pulse:  [] 98  Resp:  [16] 16  BP: (114-128)/(74-88) 127/87  SpO2:  [92 %-95 %] 92 %    Intake/Output Summary (Last 24 hours) at 7/9/2025 1502  Last data filed at 7/9/2025 1400  Gross per 24 hour   Intake 2450 ml   Output 2065 ml   Net 385 ml       Admission Weight: 97.5 kg (215 lb)  Current Weight: 101.1 kg (222 lb 14.2 oz)    PHYSICAL " EXAM  GENERAL: Patient is in no distress. Alert and oriented.  HEENT: Oropharynx pink, NG tube +   HEART: Regular rate and rhythm. S1S2.   LUNGS: Clear to auscultation bilaterally. No expiratory wheeze.  Respirations unlabored  ABDOMEN: Abdominal binder present.  JANNET drain present.  NEURO: Moving all extremities.  EXTREMITIES: No pedal edema.   SKIN: Warm, dry.  PSYCHIATRY Cooperative       Medications:        Current Facility-Administered Medications   Medication Dose Route Frequency Provider Last Rate Last Admin    heparin ANTICOAGULANT injection 5,000 Units  5,000 Units Subcutaneous Q12H Ammy Dang MD        pantoprazole (PROTONIX) injection 40 mg  40 mg Intravenous BID Ammy Dang MD   40 mg at 07/10/25 0840    piperacillin-tazobactam (ZOSYN) 4.5 g vial to attach to  mL bag  4.5 g Intravenous Q6H Rosales White PA-C   4.5 g at 07/10/25 0840    sodium chloride (PF) 0.9% PF flush 3 mL  3 mL Intracatheter Q8H OTIS Rosales White PA-C   3 mL at 07/09/25 2221     Current Facility-Administered Medications   Medication Dose Route Frequency Provider Last Rate Last Admin    acetaminophen (TYLENOL) Suppository 650 mg  650 mg Rectal Q4H PRN Rosales White PA-C        hydrALAZINE (APRESOLINE) injection 10 mg  10 mg Intravenous Q4H PRN Rosales White PA-C        HYDROmorphone (PF) (DILAUDID) injection 0.3 mg  0.3 mg Intravenous Q2H PRN Rosales White PA-C   0.3 mg at 07/09/25 1531    HYDROmorphone (PF) (DILAUDID) injection 0.5 mg  0.5 mg Intravenous Q2H PRN Rosales White PA-C   0.5 mg at 07/10/25 0733    lidocaine (LMX4) cream   Topical Q1H PRN Rosales White PA-C        lidocaine 1 % 0.1-1 mL  0.1-1 mL Other Q1H PRN Rosales White PA-C        naloxone (NARCAN) injection 0.2 mg  0.2 mg Intravenous Q2 Min PRN Rosales White PA-C        Or    naloxone (NARCAN) injection 0.4 mg  0.4 mg Intravenous Q2 Min PRN Rosales White PA-C        Or    naloxone (NARCAN) injection 0.2 mg  0.2 mg  Intramuscular Q2 Min PRN Rosales White PA-C        Or    naloxone (NARCAN) injection 0.4 mg  0.4 mg Intramuscular Q2 Min PRN Rosales White PA-C        ondansetron (ZOFRAN) injection 4 mg  4 mg Intravenous Q6H PRN Rosales White PA-C        Or    ondansetron (ZOFRAN ODT) ODT tab 4 mg  4 mg Oral Q6H PRN Rosales White PA-C        prochlorperazine (COMPAZINE) injection 10 mg  10 mg Intravenous Q6H PRN Rosales White PA-C        sodium chloride (PF) 0.9% PF flush 3 mL  3 mL Intracatheter q1 min prn Rosales White PA-C                Data:      All new lab and imaging data was reviewed.

## 2025-07-10 NOTE — PLAN OF CARE
"Patient Name: Yas  MRN: [id number]  Date of Admission: 7/8/2025  Reason for Admission: Perf  [place] [facility] [place]itals:   BP Readings from Last 1 Encounters:   07/10/25 122/79     Pulse Readings from Last 1 Encounters:   07/10/25 89     Wt Readings from Last 1 Encounters:   07/10/25 98.2 kg (216 lb 7.9 oz)     Ht Readings from Last 1 Encounters:   07/08/25 1.778 m (5' 10\")     Estimated body mass index is 31.06 kg/m  as calculated from the following:    Height as of this encounter: 1.778 m (5' 10\").    Weight as of this encounter: 98.2 kg (216 lb 7.9 oz).  Temp Readings from Last 1 Encounters:   07/09/25 98.7  F (37.1  C) (Oral)       Pain: 3-4 with dilaudid q2-3    CV Surgery Patient: No    Assessment    Resp: on 2L NC  Telemetry: SR-ST  Neuro: intact  GI/: Last BM Monday. Flatus + Yates in place.   Skin/Wounds: Midline incision and JANNET site.   Lines/Drains: JANNET drain with serosanguinous output and NG tube to LIS(60cm) w/ increasingly brown and dark red output. MD aware.   Activity: A1    Hgb trending down.     Aggression Stop Light: Green          Patient Care Plan: Gastrogaf Monday.     "

## 2025-07-10 NOTE — PROVIDER NOTIFICATION
"MD Notification    Notified Person: MD    Notified Person Name: Ammy Dang     Notification Date/Time: 7/10/25 1113    Notification Interaction: Pineda     Purpose of Notification: Do we need to continue wei    Orders Received:     Comments: \"Okay to remove today\"   "

## 2025-07-10 NOTE — PLAN OF CARE
Shift: 9206-1585    Cognition/Mentation/Neuro: Aox4 calm cooperative   VS: VSS on 1L NC  Cardiac: SR on tele   GI/: Yates in place and draining well. BS hypoactive. No BM this shift. NGT in place. Abdominal binder on.   Pulmonary: LS clear, denies SOB, shallow breaths   Pain: Managed with PRN dilaudid   Drains/Lines: 2 PIVs- NS infusing @125ml/hr. Yates out this shift at 1415. NGT to LIS- 2450ml out this shift. JANNET drain 80ml out this shift.   Skin: Midline abdominal incision, JANNET drain site   Activity: A1 GB/ walker   Diet: NPO  Discharge: Pending     Shift summary: Yates removed at 1415, due to void.

## 2025-07-10 NOTE — PROGRESS NOTES
"General Surgery Progress Note    Admission Date: 7/8/2025  Today's Date: 7/10/2025         Assessment:      Simone Johnson is a 50 year old male admitted with abdominal pain with initial picture of proximal enteritis and early SBO on CT.  Repeat CT after RRT called for increasing pain showed free air.  Taken to OR emergent early am 7/9 and was found to have a bowel perforation near duodenum-jejunum junction.    S/P   Exploratory laparotomy with repair of duodenal jejunal perforation and abdominal lavage with drain placement   POD #1     Diagnosis: Bowel Perforation         Plan:   Discussed recovery expectations.    Continue NGT to LIS.    Gastrograffin UGI on Monday  Increase activity as able.    Pain: controlled with Dilaudid q 3hrs.  Wean as able  Bowel: NGT, Protonix BID  Antibiotics: Zosyn  Diet: NPO except ice chips  IVFs: 125mL/hr  DVT prophylaxis: PCDs, heparin started.  Hgb slightly down but NG output looks more brown than maroon to me.  Dispo: Still 5-7 days        Interval History:   Doing ok.  Pain is better controlled today.  On commode but does not feel anything coming for bowel function yet.            Physical Exam:   /82 (BP Location: Right arm)   Pulse 89   Temp 98.2  F (36.8  C) (Oral)   Resp 16   Ht 1.778 m (5' 10\")   Wt 98.2 kg (216 lb 7.9 oz)   SpO2 94%   BMI 31.06 kg/m    I/O last 3 completed shifts:  In: 1450 [I.V.:1450]  Out: 4545 [Urine:650; Emesis/NG output:3725; Drains:170]  General: NAD, pleasant, alert and oriented x3  Abdomen: soft, non tender, non distended, + bowel sounds.  Incision: clean, dry, and intact  JANNET: mostly serous but some mild turbidity  NGT: brown output; 2.4L out yesterday but taking some ice chips    LABS:  Recent Results (from the past 24 hours)   Hepatic function panel   Result Value Ref Range    Protein Total 5.3 (L) 6.4 - 8.3 g/dL    Albumin 2.8 (L) 3.5 - 5.2 g/dL    Bilirubin Total 0.6 <=1.2 mg/dL    Alkaline Phosphatase 43 40 - 150 U/L    AST 12 0 " - 45 U/L    ALT 9 0 - 70 U/L    Bilirubin Direct 0.24 0.00 - 0.30 mg/dL   Glucose   Result Value Ref Range    Glucose 133 (H) 70 - 99 mg/dL   Extra Tube    Narrative    The following orders were created for panel order Extra Tube.  Procedure                               Abnormality         Status                     ---------                               -----------         ------                     Extra Purple Top Tube[[id number]]                           Final result                 Please view results for these tests on the individual orders.   Extra Purple Top Tube   Result Value Ref Range    Hold Specimen [facility]    CBC with platelets   Result Value Ref Range    WBC Count 8.1 4.0 - 11.0 10e3/uL    RBC Count 5.08 4.40 - 5.90 10e6/uL    Hemoglobin 15.1 13.3 - 17.7 g/dL    Hematocrit 45.2 40.0 - 53.0 %    MCV 89 78 - 100 fL    MCH 29.7 26.5 - 33.0 pg    MCHC 33.4 31.5 - 36.5 g/dL    RDW 16.8 (H) 10.0 - 15.0 %    Platelet Count 267 150 - 450 10e3/uL   Basic metabolic panel   Result Value Ref Range    Sodium 137 135 - 145 mmol/L    Potassium 3.7 3.4 - 5.3 mmol/L    Chloride 103 98 - 107 mmol/L    Carbon Dioxide (CO2) 23 22 - 29 mmol/L    Anion Gap 11 7 - 15 mmol/L    Urea Nitrogen 16.0 6.0 - 20.0 mg/dL    Creatinine 1.23 (H) 0.67 - 1.17 mg/dL    GFR Estimate 72 >60 mL/min/1.73m2    Calcium 8.3 (L) 8.8 - 10.4 mg/dL    Glucose 144 (H) 70 - 99 mg/dL   Hemoglobin   Result Value Ref Range    Hemoglobin 14.1 13.3 - 17.7 g/dL    MCV 87 78 - 100 fL   Comprehensive metabolic panel   Result Value Ref Range    Sodium 140 135 - 145 mmol/L    Potassium 3.5 3.4 - 5.3 mmol/L    Carbon Dioxide (CO2) 29 22 - 29 mmol/L    Anion Gap 11 7 - 15 mmol/L    Urea Nitrogen 15.6 6.0 - 20.0 mg/dL    Creatinine 1.11 0.67 - 1.17 mg/dL    GFR Estimate 81 >60 mL/min/1.73m2    Calcium 8.3 (L) 8.8 - 10.4 mg/dL    Chloride 100 98 - 107 mmol/L    Glucose 113 (H) 70 - 99 mg/dL    Alkaline Phosphatase 53 40 - 150 U/L    AST 16 0 - 45 U/L    ALT 10 0 -  70 U/L    Protein Total 5.6 (L) 6.4 - 8.3 g/dL    Albumin 2.7 (L) 3.5 - 5.2 g/dL    Bilirubin Total 0.6 <=1.2 mg/dL   CBC with platelets   Result Value Ref Range    WBC Count 7.8 4.0 - 11.0 10e3/uL    RBC Count 4.47 4.40 - 5.90 10e6/uL    Hemoglobin 13.5 13.3 - 17.7 g/dL    Hematocrit 40.3 40.0 - 53.0 %    MCV 90 78 - 100 fL    MCH 30.2 26.5 - 33.0 pg    MCHC 33.5 31.5 - 36.5 g/dL    RDW 16.8 (H) 10.0 - 15.0 %    Platelet Count 252 150 - 450 10e3/uL       Mannie Tavarez PA-C  Pager: [phone]  [facility]: [phone]

## 2025-07-10 NOTE — PLAN OF CARE
Goal Outcome Evaluation:  Neuro- x4  Tele/Cardiac- SR/ST  Resp- 2L NC   Activity- 1A not out of bed this shift   Pain- abd pain- received dilaudid x2   Drips- NS @ 125  Drains/Tubes- JANNET drain w/ serosanguinous output and NG tube to light intermittent suction w/ green bile to dark brown/red output - MD's aware   GI/- indwelling wei in place, No BM, passing gas   Diet: NPO for Medical/Clinical Reasons Except for: Meds    Plan- Continue to monitor hgb- next check at midnight, Iv abx/fluids and pain control. Gastrografin UGI Monday

## 2025-07-11 LAB
ANION GAP SERPL CALCULATED.3IONS-SCNC: 12 MMOL/L (ref 7–15)
BUN SERPL-MCNC: 12.3 MG/DL (ref 6–20)
CALCIUM SERPL-MCNC: 8.8 MG/DL (ref 8.8–10.4)
CGA SERPL-MCNC: 1856 NG/ML
CHLORIDE SERPL-SCNC: 102 MMOL/L (ref 98–107)
CREAT SERPL-MCNC: 1 MG/DL (ref 0.67–1.17)
EGFRCR SERPLBLD CKD-EPI 2021: >90 ML/MIN/1.73M2
GASTRIN SERPL-MCNC: 318 PG/ML
GLUCOSE BLDC GLUCOMTR-MCNC: 79 MG/DL (ref 70–99)
GLUCOSE BLDC GLUCOMTR-MCNC: 82 MG/DL (ref 70–99)
GLUCOSE BLDC GLUCOMTR-MCNC: 86 MG/DL (ref 70–99)
GLUCOSE BLDC GLUCOMTR-MCNC: 87 MG/DL (ref 70–99)
GLUCOSE BLDC GLUCOMTR-MCNC: 90 MG/DL (ref 70–99)
GLUCOSE BLDC GLUCOMTR-MCNC: 96 MG/DL (ref 70–99)
GLUCOSE SERPL-MCNC: 94 MG/DL (ref 70–99)
HCO3 SERPL-SCNC: 31 MMOL/L (ref 22–29)
HGB BLD-MCNC: 12.7 G/DL (ref 13.3–17.7)
MCV RBC AUTO: 87 FL (ref 78–100)
POTASSIUM SERPL-SCNC: 2.9 MMOL/L (ref 3.4–5.3)
POTASSIUM SERPL-SCNC: 3 MMOL/L (ref 3.4–5.3)
SODIUM SERPL-SCNC: 145 MMOL/L (ref 135–145)

## 2025-07-11 PROCEDURE — 80048 BASIC METABOLIC PNL TOTAL CA: CPT | Performed by: HOSPITALIST

## 2025-07-11 PROCEDURE — 84132 ASSAY OF SERUM POTASSIUM: CPT | Performed by: SURGERY

## 2025-07-11 PROCEDURE — 120N000001 HC R&B MED SURG/OB

## 2025-07-11 PROCEDURE — 258N000003 HC RX IP 258 OP 636: Performed by: HOSPITALIST

## 2025-07-11 PROCEDURE — 250N000013 HC RX MED GY IP 250 OP 250 PS 637: Performed by: HOSPITALIST

## 2025-07-11 PROCEDURE — 85018 HEMOGLOBIN: CPT | Performed by: HOSPITALIST

## 2025-07-11 PROCEDURE — 36415 COLL VENOUS BLD VENIPUNCTURE: CPT | Performed by: HOSPITALIST

## 2025-07-11 PROCEDURE — 99232 SBSQ HOSP IP/OBS MODERATE 35: CPT | Performed by: HOSPITALIST

## 2025-07-11 PROCEDURE — 36415 COLL VENOUS BLD VENIPUNCTURE: CPT | Performed by: SURGERY

## 2025-07-11 PROCEDURE — 250N000011 HC RX IP 250 OP 636: Performed by: PHYSICIAN ASSISTANT

## 2025-07-11 PROCEDURE — 250N000011 HC RX IP 250 OP 636: Performed by: SURGERY

## 2025-07-11 RX ORDER — OXYCODONE HYDROCHLORIDE 5 MG/1
5 TABLET ORAL EVERY 4 HOURS PRN
Refills: 0 | Status: DISCONTINUED | OUTPATIENT
Start: 2025-07-11 | End: 2025-07-11

## 2025-07-11 RX ORDER — POTASSIUM CHLORIDE 7.45 MG/ML
10 INJECTION INTRAVENOUS
Status: COMPLETED | OUTPATIENT
Start: 2025-07-11 | End: 2025-07-11

## 2025-07-11 RX ORDER — SALIVA STIMULANT COMB. NO.3
1 SPRAY, NON-AEROSOL (ML) MUCOUS MEMBRANE 4 TIMES DAILY PRN
Status: DISCONTINUED | OUTPATIENT
Start: 2025-07-11 | End: 2025-07-16 | Stop reason: HOSPADM

## 2025-07-11 RX ORDER — DIAZEPAM 10 MG/2ML
2.5-5 INJECTION, SOLUTION INTRAMUSCULAR; INTRAVENOUS EVERY 8 HOURS PRN
Status: DISCONTINUED | OUTPATIENT
Start: 2025-07-11 | End: 2025-07-14

## 2025-07-11 RX ORDER — SODIUM CHLORIDE AND POTASSIUM CHLORIDE 150; 450 MG/100ML; MG/100ML
INJECTION, SOLUTION INTRAVENOUS CONTINUOUS
Status: DISCONTINUED | OUTPATIENT
Start: 2025-07-11 | End: 2025-07-14

## 2025-07-11 RX ADMIN — HYDROMORPHONE HYDROCHLORIDE 0.3 MG: 1 INJECTION, SOLUTION INTRAMUSCULAR; INTRAVENOUS; SUBCUTANEOUS at 12:48

## 2025-07-11 RX ADMIN — PANTOPRAZOLE SODIUM 40 MG: 40 INJECTION, POWDER, FOR SOLUTION INTRAVENOUS at 20:22

## 2025-07-11 RX ADMIN — Medication 1 SPRAY: at 18:15

## 2025-07-11 RX ADMIN — POTASSIUM CHLORIDE 10 MEQ: 7.46 INJECTION, SOLUTION INTRAVENOUS at 16:29

## 2025-07-11 RX ADMIN — POTASSIUM CHLORIDE 10 MEQ: 7.46 INJECTION, SOLUTION INTRAVENOUS at 15:55

## 2025-07-11 RX ADMIN — Medication 1 SPRAY: at 12:37

## 2025-07-11 RX ADMIN — HEPARIN SODIUM 5000 UNITS: 5000 INJECTION, SOLUTION INTRAVENOUS; SUBCUTANEOUS at 06:43

## 2025-07-11 RX ADMIN — POTASSIUM CHLORIDE 10 MEQ: 7.46 INJECTION, SOLUTION INTRAVENOUS at 12:51

## 2025-07-11 RX ADMIN — POTASSIUM CHLORIDE AND SODIUM CHLORIDE: 450; 150 INJECTION, SOLUTION INTRAVENOUS at 12:37

## 2025-07-11 RX ADMIN — Medication 1 SPRAY: at 18:16

## 2025-07-11 RX ADMIN — DIAZEPAM 2.5 MG: 5 INJECTION INTRAMUSCULAR; INTRAVENOUS at 22:41

## 2025-07-11 RX ADMIN — PIPERACILLIN AND TAZOBACTAM 4.5 G: 4; .5 INJECTION, POWDER, FOR SOLUTION INTRAVENOUS at 03:02

## 2025-07-11 RX ADMIN — HEPARIN SODIUM 5000 UNITS: 5000 INJECTION, SOLUTION INTRAVENOUS; SUBCUTANEOUS at 18:06

## 2025-07-11 RX ADMIN — HYDROMORPHONE HYDROCHLORIDE 0.3 MG: 1 INJECTION, SOLUTION INTRAMUSCULAR; INTRAVENOUS; SUBCUTANEOUS at 20:21

## 2025-07-11 RX ADMIN — SODIUM CHLORIDE: 900 INJECTION INTRAVENOUS at 03:16

## 2025-07-11 RX ADMIN — HYDROMORPHONE HYDROCHLORIDE 0.3 MG: 1 INJECTION, SOLUTION INTRAMUSCULAR; INTRAVENOUS; SUBCUTANEOUS at 15:03

## 2025-07-11 RX ADMIN — POTASSIUM CHLORIDE 10 MEQ: 7.46 INJECTION, SOLUTION INTRAVENOUS at 14:53

## 2025-07-11 RX ADMIN — POTASSIUM CHLORIDE 10 MEQ: 7.46 INJECTION, SOLUTION INTRAVENOUS at 11:51

## 2025-07-11 RX ADMIN — PIPERACILLIN AND TAZOBACTAM 4.5 G: 4; .5 INJECTION, POWDER, FOR SOLUTION INTRAVENOUS at 20:22

## 2025-07-11 RX ADMIN — HYDROMORPHONE HYDROCHLORIDE 0.5 MG: 1 INJECTION, SOLUTION INTRAMUSCULAR; INTRAVENOUS; SUBCUTANEOUS at 06:43

## 2025-07-11 RX ADMIN — HYDROMORPHONE HYDROCHLORIDE 0.5 MG: 1 INJECTION, SOLUTION INTRAMUSCULAR; INTRAVENOUS; SUBCUTANEOUS at 03:02

## 2025-07-11 RX ADMIN — POTASSIUM CHLORIDE 10 MEQ: 7.46 INJECTION, SOLUTION INTRAVENOUS at 13:43

## 2025-07-11 RX ADMIN — PANTOPRAZOLE SODIUM 40 MG: 40 INJECTION, POWDER, FOR SOLUTION INTRAVENOUS at 08:09

## 2025-07-11 RX ADMIN — HYDROMORPHONE HYDROCHLORIDE 0.5 MG: 1 INJECTION, SOLUTION INTRAMUSCULAR; INTRAVENOUS; SUBCUTANEOUS at 09:51

## 2025-07-11 RX ADMIN — POTASSIUM CHLORIDE AND SODIUM CHLORIDE: 450; 150 INJECTION, SOLUTION INTRAVENOUS at 22:41

## 2025-07-11 RX ADMIN — PIPERACILLIN AND TAZOBACTAM 4.5 G: 4; .5 INJECTION, POWDER, FOR SOLUTION INTRAVENOUS at 08:09

## 2025-07-11 RX ADMIN — PIPERACILLIN AND TAZOBACTAM 4.5 G: 4; .5 INJECTION, POWDER, FOR SOLUTION INTRAVENOUS at 15:03

## 2025-07-11 ASSESSMENT — ACTIVITIES OF DAILY LIVING (ADL)
ADLS_ACUITY_SCORE: 57
ADLS_ACUITY_SCORE: 55
ADLS_ACUITY_SCORE: 59
ADLS_ACUITY_SCORE: 55
ADLS_ACUITY_SCORE: 57
ADLS_ACUITY_SCORE: 57
ADLS_ACUITY_SCORE: 59
ADLS_ACUITY_SCORE: 55
ADLS_ACUITY_SCORE: 59
ADLS_ACUITY_SCORE: 55
ADLS_ACUITY_SCORE: 59
ADLS_ACUITY_SCORE: 59

## 2025-07-11 NOTE — PLAN OF CARE
Reason for Admission: POD 2 ex lap w/ perf repair/lavage  Level of Care: Medical     Shift Events:   - IMC discontinued   - Ambulated outside room     Vitals: VSS  Telemetry: Discontinued   Pain: Abd pain - prn dilaudid given. Pt report having hallucinations when he closes his eyes to sleep. Advised to taper off pain meds a bit as pain has been well controlled especially at rest.    Neuro: A&Ox4   Resp: LS clear. Tolerates RA but often desats - intermittently wears 2 L.   GI/: -BM this shift. BS audible, +flatus. NPO, ice chips. Borderline UOP.   Skin/Wounds: Midline abd incision.   Lines/Drains: LLQ JANNET drain to bulb suction. R PIV infusing 1/2NS w/ Kcl @ 125. NGT to STEPH. Brown output.   Activity: SBA   Sleep: Unable to fall asleep   Abnormal Labs: K 3.0, replaced.     Plan of Care: Plan for gastrografin challenge Monday. Pain management, IS, mobilize.

## 2025-07-11 NOTE — PLAN OF CARE
2300- 0700 Alert and oriented x4. Vital signs stable on RA. Up SBA. Tolerating NPO. Lungs sounds clear. IS up to 750. Bowel sounds +. Passing flatus, BM x1. Voiding adequately. Skin: Midline incision with staples, IJEOMA. NG to LIS at 60cm with brown-maroon/dark red output. JANNET drain on L abdomen, dressing CDI, strip Q4. Pain managed with Dilaudid 0.5mg PRN. Denies nausea. Neuro's and CMS intact. Tele SR. IVF infusing.

## 2025-07-11 NOTE — PROGRESS NOTES
[facility]  Hospitalist Progress Note   07/11/2025          Assessment and Plan:       Simone Johnson is a 50 year old  male with medical history of history of ulcerative colitis (details not available in epic), history of contained duodenal ulcer admitted on 7/8/2025 with abdominal pain, vomiting, diarrhea.    -seen in ED on 7/6 for nausea, vomiting, diarrhea.  In ED WBC 13.5, electrolytes within normal limits, lipase 374.  Patient had received 1 L fluid bolus, IV Zofran with which symptoms improved, discharged home with close follow-up.       Status post emergent exploratory laparotomy with repair of duodenal jejunal perforation and abdominal lavage with drain placement 7/9/2025  Status post proximal jejunum perforation with small volume pneumoperitoneum.  Acute abdominal pain likely from enteritis, early small bowel obstruction on admission.  --Reported diffuse abdominal pain, multiple episodes of loose watery diarrhea, vomiting nonbloody.  Little relief of pain with pantoprazole, dicyclomine, Zofran at home prompting ER evaluation.  -WBC 8.4.  CRP less than 3.  Lipase 259.  UA No concerns for infection. Sodium 136, creatinine 1.01, potassium 3.6.  Liver enzymes within normal limits.   -CT aortic survey 7/8 consistent with a nonspecific proximal enteritis. fluid-filled loops of small bowel throughout the abdomen, borderline distended in the pelvis with gradual narrowing of distal small bowel caliber. Developing obstructive process not excluded.  ---Patient was admitted to the observation unit.  GI, general surgery followed.  Recommended bowel rest, conservative management.  N.p.o., IV fluids, IV pantoprazole, stool studies ordered.   --- On 7/8 night patient had RRT for worsening abdominal pain. Stat CT imaging with New perforation of the severely inflamed proximal jejunum in the left upper quadrant with small volume pneumoperitoneum, regional peritonitis, and increased volume of ascites.  --  Underwent emergent exploratory laparotomy with repair of duodenal jejunal perforation and abdominal lavage with drain placement on 7/9   1.5 L of succus throughout the abdomen.   --7/10 postop abdominal pain.  Yates catheter removed.   --7/11 patient continues to have coffee-ground colored output from NG (high output likely from ice chips, water with sponge] JANNET drain with serosanguineous output.  Passing gas.  Voiding.   -- Will defer postoperative care including pharmacological DVT prophylaxis to surgical team.  Appreciate general surgery comanagement.  [facility] following, appreciate input.  NG tube to low intermittent suction.  Closely monitor output.  Continue NPO.  Continue IV fluids.  Continue IV PPI twice daily.  Continue IV Zosyn every 6 hours [7/9].  As needed antiemetics.  IV as needed pain meds.  Encourage ambulation.    Acute hypoxic respiratory failure -multifactorial secondary to pain, narcotic use, underlying CHACHA.  Sinus tachycardia -improved  Left shoulder pain improved   Elevated blood pressures without diagnosis of hypertension - improving   Report of shortness of breath, noted hypoxia.  EKG on admission sinus bradycardia, heart rate 54.  No acute ischemic changes.  Repeat EKG showing sinus tachycardia.  Troponin high-sensitivity 6 < 6.  TSH, magnesium within normal limits.  CT aortic survey from ED-no central pulmonary embolism.  --Patient had been receiving intravenous pain meds, history of CHACHA.  Tachycardia improved.  Continues to require supplemental nasal oxygen.  Wean as tolerated.  Discontinue telemetry monitoring, sinus rhythm.  Aggressive incentive spirometry.  As needed IV hydralazine ordered.    Acute anemia likely dilutional.  Presented with hemoglobin of 15.7, now dropped to 12.7.   No active bleeding.  Noted coffee-ground emesis from NG, high output likely from ice chips, water via sponge.  Monitor hemoglobin level in a.m. or earlier if symptomatic     Hyperglycemia, prediabetes with  "a hemoglobin A1c of 5.7.  Monitor blood sugars, if elevated will consider sliding scale insulin.    Hypoalbuminemia likely dilutional, acute illness.  Hypocalcemia likely dilutional.  Monitor.    Hypokalemia.  Potassium replacement protocol ordered  Switch maintenance fluids to fluids with potassium.          Clinically Significant Risk Factors        # Hypokalemia: Lowest K = 3 mmol/L in last 2 days, will replace as needed        # Hypoalbuminemia: Lowest albumin = 2.7 g/dL at 7/10/2025  5:25 AM, will monitor as appropriate                # Obesity: Estimated body mass index is 31.06 kg/m  as calculated from the following:    Height as of this encounter: 1.778 m (5' 10\").    Weight as of this encounter: 98.2 kg (216 lb 7.9 oz)., PRESENT ON ADMISSION          Orders Placed This Encounter      NPO for Medical/Clinical Reasons Except for: Meds      DVT Prophylaxis: SCDs, pharmacological DVT prophylaxis per surgical team.  Code Status: Full Code  Disposition: Expected discharge in greater than 2 days.    Medically Ready for Discharge: Anticipated in 2-4 Days     Discussed with patient, his wife by the bedside, bedside RN  More than 70% of time spent in direct patient care, care coordination, patient counseling, and formalizing plan of care.      Stevo Langley MD        Interval History:        Patient lying in bed.  Denies any chest pain or palpitations.  Denies any headache or dizziness.  No nausea.  Having NG tube, coffee-ground emesis.  Reports abdominal pain, some relief with pain meds.  Denies any new back pain.  Denies any new tingling or numbness.  Yates catheter removed 7/10,  Afebrile  Has been n.p.o., receiving IV fluids.  On intravenous pain meds, intravenous Zosyn.         Physical Exam:        Physical Exam   Temp:  [97.6  F (36.4  C)-99  F (37.2  C)] 98.1  F (36.7  C)  Pulse:  [71-87] 84  Resp:  [18-20] 20  BP: (117-134)/(72-88) 130/88  SpO2:  [87 %-95 %] 93 %    Intake/Output Summary (Last 24 hours) " at 7/9/2025 1502  Last data filed at 7/9/2025 1400  Gross per 24 hour   Intake 2450 ml   Output 2065 ml   Net 385 ml       Admission Weight: 97.5 kg (215 lb)  Current Weight: 101.1 kg (222 lb 14.2 oz)    PHYSICAL EXAM  GENERAL: Patient is in no distress. Alert and oriented.  HEENT: Oropharynx pink, NG tube +   HEART: Regular rate and rhythm. S1S2.   LUNGS: Clear to auscultation bilaterally. No expiratory wheeze.  Respirations unlabored  ABDOMEN: Abdominal binder present.  JANNET drain present.  NEURO: Moving all extremities.  EXTREMITIES: No pedal edema.   SKIN: Warm, dry.  PSYCHIATRY Cooperative       Medications:        Current Facility-Administered Medications   Medication Dose Route Frequency Provider Last Rate Last Admin    heparin ANTICOAGULANT injection 5,000 Units  5,000 Units Subcutaneous Q12H Ammy Dang MD   5,000 Units at 07/11/25 0643    pantoprazole (PROTONIX) injection 40 mg  40 mg Intravenous BID Ammy Dang MD   40 mg at 07/11/25 0809    piperacillin-tazobactam (ZOSYN) 4.5 g vial to attach to  mL bag  4.5 g Intravenous Q6H Rosales White PA-C   4.5 g at 07/11/25 0809    potassium chloride 10 mEq in 100 mL sterile water infusion  10 mEq Intravenous Q1H Ammy Dang  mL/hr at 07/11/25 1343 10 mEq at 07/11/25 1343    sodium chloride (PF) 0.9% PF flush 3 mL  3 mL Intracatheter Q8H OTIS Rosales White PA-C   3 mL at 07/09/25 2221     Current Facility-Administered Medications   Medication Dose Route Frequency Provider Last Rate Last Admin    acetaminophen (TYLENOL) Suppository 650 mg  650 mg Rectal Q4H PRN Rosales White PA-C        hydrALAZINE (APRESOLINE) injection 10 mg  10 mg Intravenous Q4H PRN Rosales White PA-C        HYDROmorphone (PF) (DILAUDID) injection 0.3 mg  0.3 mg Intravenous Q2H PRN Rosales White PA-C   0.3 mg at 07/11/25 1248    HYDROmorphone (PF) (DILAUDID) injection 0.5 mg  0.5 mg Intravenous Q2H PRN Rosales White PA-C   0.5 mg at 07/11/25 0951     lidocaine (LMX4) cream   Topical Q1H PRN Rosales White PA-C        lidocaine 1 % 0.1-1 mL  0.1-1 mL Other Q1H PRN Rosales White PA-C        naloxone (NARCAN) injection 0.2 mg  0.2 mg Intravenous Q2 Min PRN Rosales White PA-C        Or    naloxone (NARCAN) injection 0.4 mg  0.4 mg Intravenous Q2 Min PRN Rosales White PA-C        Or    naloxone (NARCAN) injection 0.2 mg  0.2 mg Intramuscular Q2 Min PRN Rosales White PA-C        Or    naloxone (NARCAN) injection 0.4 mg  0.4 mg Intramuscular Q2 Min PRN Rosales White PA-C        ondansetron (ZOFRAN) injection 4 mg  4 mg Intravenous Q6H PRN Rosales White PA-C        Or    ondansetron (ZOFRAN ODT) ODT tab 4 mg  4 mg Oral Q6H PRN Rosales White PA-C        oxyCODONE (ROXICODONE) tablet 5 mg  5 mg Oral Q4H PRN Stevo Langley MD        oxyCODONE IR (ROXICODONE) half-tab 2.5 mg  2.5 mg Oral Q4H PRN Stevo Langley MD        prochlorperazine (COMPAZINE) injection 10 mg  10 mg Intravenous Q6H PRN Rosales White PA-C        sodium chloride (OCEAN) 0.65 % nasal spray 1 spray  1 spray Both Nostrils Q1H PRN Stevo Langley MD   1 spray at 07/11/25 1237    sodium chloride (PF) 0.9% PF flush 3 mL  3 mL Intracatheter q1 min prn Rosales White PA-C   3 mL at 07/10/25 2031            Data:      All new lab and imaging data was reviewed.

## 2025-07-11 NOTE — PROGRESS NOTES
"Surgery    Doing pretty well today.   Reasonably comfortable, pain controlled.   + flatus, + bm   Consumed ice chips and water via sponge most of yesterday which accounts for high NG output.   Tolerating NG fine but reports nasal congestion.  Walking in room.  Voiding normally in bathroom.   Denies CP, dyspnea  Many family members present and supportive    Gen:  Awake, Alert, NAD, pleasant   /78   Pulse 71   Temp 97.6  F (36.4  C) (Oral)   Resp 18   Ht 1.778 m (5' 10\")   Wt 98.2 kg (216 lb 7.9 oz)   SpO2 92%   BMI 31.06 kg/m    Resp - Non-labored. clear to ascultation.    Cardiac - Regular rate & rhythm without murmur  Abdomen - bowel sounds present, soft, appropriately tender, non distended. Incision healing appropriately without erythema or drainage. Drain site ok.   Extremities - no lower extremity edema or tenderness with palpation    Drain output: 95cc yesterday scant serosanguinous fluid in tubing and bulb.   NG output 5600cc yesterday, diminishing throughout the day. Clear/light green output in wall canister. (Diluted with water)    Hgb 12.7 (13.5)    A/P This patient is a 50 year old man with a significant past medical history of perforated duodenal ulcer and ventral hernia repair with mesh who presents with diffuse abdominal pain. He had nonspecific enteritis with loops of mildly dilated small bowel. Later on 7/8, an RRT was called for intractable abdominal pain. Repeat CT was obtained and definitively evidence of perforated viscus. Emergent exploratory laparotomy was performed with abdominal lavage and primary repair of duodenal jejunal perforation on 7/9/25.      - ng tube to LIS  - npo, meds  - dvt ppx: heparin, pcd's, ambulate  - gi ppx: protonix bid  - continue JANNET drain  - zosyn for intraabdominal infection  - acute blood loss anemia: continue to monitor hgb and signs of bleeding.  - gu: wei removed, voiding normally  - ent: saline nasal spray prn  - pulm: encourage incentive spirometer " use, wean supplemental oxygen.  - f/e/n: NS 125cc/hr --> 1/2NS w/KCl for hypocalcemia along with replacement protocol  - ambulate  - plan for gastrografin UGI Monday.   - dispo: home in ~5 days    Rosales White PA-C  Office: [phone]  Pager: [phone]

## 2025-07-11 NOTE — PLAN OF CARE
Pod #1  Exploratory laparotomy with repair of duodenal jejunal perforation and abdominal lavage with drain placement. Pt A&Ox4. VSS on 2L NC. NSR. A1 GB/ walker. NPO. Yates removed, due to void.  BS hypoactive. BMx1 this shift. NGT in place w/ increasingly brown and dark red output. Abdominal binder on. Pain: Managed with PRN dilaudid. 2 PIVs- NS infusing @125ml/hr. NGT 1450ml out this shift. Midline abdominal incision, JANNET drain site.

## 2025-07-12 LAB
ALBUMIN SERPL BCG-MCNC: 2.9 G/DL (ref 3.5–5.2)
ALP SERPL-CCNC: 48 U/L (ref 40–150)
ALT SERPL W P-5'-P-CCNC: 8 U/L (ref 0–70)
ANION GAP SERPL CALCULATED.3IONS-SCNC: 16 MMOL/L (ref 7–15)
AST SERPL W P-5'-P-CCNC: 12 U/L (ref 0–45)
BILIRUB DIRECT SERPL-MCNC: 0.11 MG/DL (ref 0–0.3)
BILIRUB SERPL-MCNC: 0.3 MG/DL
BUN SERPL-MCNC: 12 MG/DL (ref 6–20)
CALCIUM SERPL-MCNC: 8.7 MG/DL (ref 8.8–10.4)
CHLORIDE SERPL-SCNC: 101 MMOL/L (ref 98–107)
CREAT SERPL-MCNC: 0.89 MG/DL (ref 0.67–1.17)
EGFRCR SERPLBLD CKD-EPI 2021: >90 ML/MIN/1.73M2
GLUCOSE BLDC GLUCOMTR-MCNC: 80 MG/DL (ref 70–99)
GLUCOSE BLDC GLUCOMTR-MCNC: 81 MG/DL (ref 70–99)
GLUCOSE BLDC GLUCOMTR-MCNC: 82 MG/DL (ref 70–99)
GLUCOSE BLDC GLUCOMTR-MCNC: 82 MG/DL (ref 70–99)
GLUCOSE BLDC GLUCOMTR-MCNC: 84 MG/DL (ref 70–99)
GLUCOSE SERPL-MCNC: 85 MG/DL (ref 70–99)
HCO3 SERPL-SCNC: 28 MMOL/L (ref 22–29)
HGB BLD-MCNC: 11.8 G/DL (ref 13.3–17.7)
MAGNESIUM SERPL-MCNC: 2 MG/DL (ref 1.7–2.3)
MCV RBC AUTO: 90 FL (ref 78–100)
PHOSPHATE SERPL-MCNC: 2.6 MG/DL (ref 2.5–4.5)
POTASSIUM SERPL-SCNC: 2.9 MMOL/L (ref 3.4–5.3)
POTASSIUM SERPL-SCNC: 3.3 MMOL/L (ref 3.4–5.3)
PROT SERPL-MCNC: 5.8 G/DL (ref 6.4–8.3)
SODIUM SERPL-SCNC: 145 MMOL/L (ref 135–145)

## 2025-07-12 PROCEDURE — 85018 HEMOGLOBIN: CPT | Performed by: HOSPITALIST

## 2025-07-12 PROCEDURE — 250N000011 HC RX IP 250 OP 636: Performed by: PHYSICIAN ASSISTANT

## 2025-07-12 PROCEDURE — 84100 ASSAY OF PHOSPHORUS: CPT | Performed by: STUDENT IN AN ORGANIZED HEALTH CARE EDUCATION/TRAINING PROGRAM

## 2025-07-12 PROCEDURE — 84132 ASSAY OF SERUM POTASSIUM: CPT | Performed by: STUDENT IN AN ORGANIZED HEALTH CARE EDUCATION/TRAINING PROGRAM

## 2025-07-12 PROCEDURE — 120N000001 HC R&B MED SURG/OB

## 2025-07-12 PROCEDURE — 36415 COLL VENOUS BLD VENIPUNCTURE: CPT | Performed by: STUDENT IN AN ORGANIZED HEALTH CARE EDUCATION/TRAINING PROGRAM

## 2025-07-12 PROCEDURE — 84155 ASSAY OF PROTEIN SERUM: CPT | Performed by: HOSPITALIST

## 2025-07-12 PROCEDURE — 99232 SBSQ HOSP IP/OBS MODERATE 35: CPT | Performed by: STUDENT IN AN ORGANIZED HEALTH CARE EDUCATION/TRAINING PROGRAM

## 2025-07-12 PROCEDURE — 250N000011 HC RX IP 250 OP 636: Performed by: SURGERY

## 2025-07-12 PROCEDURE — 250N000011 HC RX IP 250 OP 636: Performed by: STUDENT IN AN ORGANIZED HEALTH CARE EDUCATION/TRAINING PROGRAM

## 2025-07-12 PROCEDURE — 36415 COLL VENOUS BLD VENIPUNCTURE: CPT | Performed by: HOSPITALIST

## 2025-07-12 PROCEDURE — 80048 BASIC METABOLIC PNL TOTAL CA: CPT | Performed by: HOSPITALIST

## 2025-07-12 PROCEDURE — 83735 ASSAY OF MAGNESIUM: CPT | Performed by: STUDENT IN AN ORGANIZED HEALTH CARE EDUCATION/TRAINING PROGRAM

## 2025-07-12 RX ORDER — METHOCARBAMOL 100 MG/ML
500 INJECTION, SOLUTION INTRAMUSCULAR; INTRAVENOUS EVERY 8 HOURS
Status: DISCONTINUED | OUTPATIENT
Start: 2025-07-12 | End: 2025-07-14

## 2025-07-12 RX ORDER — POTASSIUM CHLORIDE 1500 MG/1
40 TABLET, EXTENDED RELEASE ORAL ONCE
Status: COMPLETED | OUTPATIENT
Start: 2025-07-12 | End: 2025-07-12

## 2025-07-12 RX ORDER — POTASSIUM CHLORIDE 1.5 G/1.58G
20 POWDER, FOR SOLUTION ORAL
Status: COMPLETED | OUTPATIENT
Start: 2025-07-12 | End: 2025-07-12

## 2025-07-12 RX ORDER — POTASSIUM CHLORIDE 1.5 G/1.58G
40 POWDER, FOR SOLUTION ORAL ONCE
Status: COMPLETED | OUTPATIENT
Start: 2025-07-12 | End: 2025-07-12

## 2025-07-12 RX ORDER — HYDROMORPHONE HCL IN WATER/PF 6 MG/30 ML
0.2 PATIENT CONTROLLED ANALGESIA SYRINGE INTRAVENOUS
Status: DISCONTINUED | OUTPATIENT
Start: 2025-07-12 | End: 2025-07-15

## 2025-07-12 RX ORDER — POTASSIUM CHLORIDE 7.45 MG/ML
10 INJECTION INTRAVENOUS
Status: COMPLETED | OUTPATIENT
Start: 2025-07-12 | End: 2025-07-13

## 2025-07-12 RX ORDER — POTASSIUM CHLORIDE 7.45 MG/ML
10 INJECTION INTRAVENOUS
Status: DISCONTINUED | OUTPATIENT
Start: 2025-07-12 | End: 2025-07-12

## 2025-07-12 RX ORDER — POTASSIUM CHLORIDE 7.45 MG/ML
10 INJECTION INTRAVENOUS
Status: COMPLETED | OUTPATIENT
Start: 2025-07-12 | End: 2025-07-12

## 2025-07-12 RX ADMIN — POTASSIUM CHLORIDE 10 MEQ: 7.46 INJECTION, SOLUTION INTRAVENOUS at 11:01

## 2025-07-12 RX ADMIN — POTASSIUM CHLORIDE 10 MEQ: 7.46 INJECTION, SOLUTION INTRAVENOUS at 17:30

## 2025-07-12 RX ADMIN — Medication 1 SPRAY: at 00:22

## 2025-07-12 RX ADMIN — POTASSIUM CHLORIDE 10 MEQ: 7.46 INJECTION, SOLUTION INTRAVENOUS at 22:23

## 2025-07-12 RX ADMIN — HEPARIN SODIUM 5000 UNITS: 5000 INJECTION, SOLUTION INTRAVENOUS; SUBCUTANEOUS at 06:30

## 2025-07-12 RX ADMIN — Medication 1 SPRAY: at 23:21

## 2025-07-12 RX ADMIN — METHOCARBAMOL 500 MG: 100 INJECTION INTRAMUSCULAR; INTRAVENOUS at 12:05

## 2025-07-12 RX ADMIN — HEPARIN SODIUM 5000 UNITS: 5000 INJECTION, SOLUTION INTRAVENOUS; SUBCUTANEOUS at 17:31

## 2025-07-12 RX ADMIN — PIPERACILLIN AND TAZOBACTAM 4.5 G: 4; .5 INJECTION, POWDER, FOR SOLUTION INTRAVENOUS at 20:11

## 2025-07-12 RX ADMIN — POTASSIUM CHLORIDE AND SODIUM CHLORIDE: 450; 150 INJECTION, SOLUTION INTRAVENOUS at 15:54

## 2025-07-12 RX ADMIN — Medication 1 SPRAY: at 12:00

## 2025-07-12 RX ADMIN — METHOCARBAMOL 500 MG: 100 INJECTION INTRAMUSCULAR; INTRAVENOUS at 21:08

## 2025-07-12 RX ADMIN — POTASSIUM CHLORIDE 10 MEQ: 7.46 INJECTION, SOLUTION INTRAVENOUS at 12:05

## 2025-07-12 RX ADMIN — POTASSIUM CHLORIDE 10 MEQ: 7.46 INJECTION, SOLUTION INTRAVENOUS at 14:24

## 2025-07-12 RX ADMIN — Medication 1 SPRAY: at 06:40

## 2025-07-12 RX ADMIN — POTASSIUM CHLORIDE AND SODIUM CHLORIDE: 450; 150 INJECTION, SOLUTION INTRAVENOUS at 06:40

## 2025-07-12 RX ADMIN — PANTOPRAZOLE SODIUM 40 MG: 40 INJECTION, POWDER, FOR SOLUTION INTRAVENOUS at 20:11

## 2025-07-12 RX ADMIN — POTASSIUM CHLORIDE 10 MEQ: 7.46 INJECTION, SOLUTION INTRAVENOUS at 23:19

## 2025-07-12 RX ADMIN — PANTOPRAZOLE SODIUM 40 MG: 40 INJECTION, POWDER, FOR SOLUTION INTRAVENOUS at 09:46

## 2025-07-12 RX ADMIN — POTASSIUM CHLORIDE 10 MEQ: 7.46 INJECTION, SOLUTION INTRAVENOUS at 13:05

## 2025-07-12 RX ADMIN — PIPERACILLIN AND TAZOBACTAM 4.5 G: 4; .5 INJECTION, POWDER, FOR SOLUTION INTRAVENOUS at 09:46

## 2025-07-12 RX ADMIN — Medication 1 SPRAY: at 06:41

## 2025-07-12 RX ADMIN — POTASSIUM CHLORIDE 10 MEQ: 7.46 INJECTION, SOLUTION INTRAVENOUS at 16:29

## 2025-07-12 RX ADMIN — PIPERACILLIN AND TAZOBACTAM 4.5 G: 4; .5 INJECTION, POWDER, FOR SOLUTION INTRAVENOUS at 14:38

## 2025-07-12 RX ADMIN — PIPERACILLIN AND TAZOBACTAM 4.5 G: 4; .5 INJECTION, POWDER, FOR SOLUTION INTRAVENOUS at 02:27

## 2025-07-12 RX ADMIN — POTASSIUM CHLORIDE 10 MEQ: 7.46 INJECTION, SOLUTION INTRAVENOUS at 15:25

## 2025-07-12 RX ADMIN — POTASSIUM CHLORIDE 10 MEQ: 7.46 INJECTION, SOLUTION INTRAVENOUS at 09:46

## 2025-07-12 ASSESSMENT — ACTIVITIES OF DAILY LIVING (ADL)
ADLS_ACUITY_SCORE: 59
ADLS_ACUITY_SCORE: 58
ADLS_ACUITY_SCORE: 59
ADLS_ACUITY_SCORE: 58
ADLS_ACUITY_SCORE: 59

## 2025-07-12 NOTE — PROGRESS NOTES
[facility]  Hospitalist Progress Note   07/12/2025          Assessment and Plan:       Simone Johnson is a 50 year old  male with medical history of history of ulcerative colitis (details not available in epic), history of contained duodenal ulcer admitted on 7/8/2025 with abdominal pain, vomiting, diarrhea.    -seen in ED on 7/6 for nausea, vomiting, diarrhea.  In ED WBC 13.5, electrolytes within normal limits, lipase 374.  Patient had received 1 L fluid bolus, IV Zofran with which symptoms improved, discharged home with close follow-up.       Status post emergent exploratory laparotomy with repair of duodenal jejunal perforation and abdominal lavage with drain placement 7/9/2025  Status post proximal jejunum perforation with small volume pneumoperitoneum.  Acute abdominal pain likely from enteritis, early small bowel obstruction on admission.  --Reported diffuse abdominal pain, multiple episodes of loose watery diarrhea, vomiting nonbloody.  Little relief of pain with pantoprazole, dicyclomine, Zofran at home prompting ER evaluation.  -WBC 8.4.  CRP less than 3.  Lipase 259.  UA No concerns for infection. Sodium 136, creatinine 1.01, potassium 3.6.  Liver enzymes within normal limits.   -CT aortic survey 7/8 consistent with a nonspecific proximal enteritis. fluid-filled loops of small bowel throughout the abdomen, borderline distended in the pelvis with gradual narrowing of distal small bowel caliber. Developing obstructive process not excluded.  ---Patient was admitted to the observation unit.  GI, general surgery followed.  Recommended bowel rest, conservative management.  N.p.o., IV fluids, IV pantoprazole, stool studies ordered.   --- On 7/8 night patient had RRT for worsening abdominal pain. Stat CT imaging with New perforation of the severely inflamed proximal jejunum in the left upper quadrant with small volume pneumoperitoneum, regional peritonitis, and increased volume of ascites.  --  Underwent emergent exploratory laparotomy with repair of duodenal jejunal perforation and abdominal lavage with drain placement on 7/9   1.5 L of succus throughout the abdomen.   --7/10 postop abdominal pain.  Yates catheter removed.   --7/11 patient continues to have coffee-ground colored output from NG (high output likely from ice chips, water with sponge] JANNET drain with serosanguineous output.  Passing gas.  Voiding.   -- Will defer postoperative care including pharmacological DVT prophylaxis to surgical team.  Appreciate general surgery comanagement.   - Discussed TPN with surgery who agree. However, patient wants to hold off given it would require a PICC line. If we cannot get diet restarted on Monday, will revisit starting TPN with him as he has not had any nutrition since 7/8/2025.  - NG tube to low intermittent suction.  Closely monitor output.  - Continue NPO.  - Continue IV fluids.  - Continue IV PPI twice daily.  - Continue IV Zosyn every 6 hours [7/9].  - As needed antiemetics.  - IV as needed pain meds.  - Encourage ambulation.    Acute hypoxic respiratory failure -multifactorial secondary to pain, narcotic use, underlying CHACHA, resolved  Sinus tachycardia -improved  Left shoulder pain improved   Elevated blood pressures without diagnosis of hypertension - improving   Report of shortness of breath, noted hypoxia.  EKG on admission sinus bradycardia, heart rate 54.  No acute ischemic changes.  Repeat EKG showing sinus tachycardia.  Troponin high-sensitivity 6 < 6.  TSH, magnesium within normal limits.  CT aortic survey from ED-no central pulmonary embolism.  Aggressive incentive spirometry.  As needed IV hydralazine ordered.    Acute anemia likely dilutional.  Presented with hemoglobin of 15.7, now dropped to 12.7.   No signs of active bleeding.  Noted coffee-ground emesis from NG, high output likely from ice chips, water via sponge.  Monitor hemoglobin level in a.m. or earlier if symptomatic    "  Hyperglycemia, prediabetes with a hemoglobin A1c of 5.7.  Monitor blood sugars, if elevated will consider sliding scale insulin.    Hypoalbuminemia likely dilutional, acute illness.  Hypocalcemia likely dilutional.  Monitor.    Hypokalemia.  Potassium replacement protocol ordered  Switch maintenance fluids to fluids with potassium.          Clinically Significant Risk Factors        # Hypokalemia: Lowest K = 2.9 mmol/L in last 2 days, will replace as needed        # Hypoalbuminemia: Lowest albumin = 2.7 g/dL at 7/10/2025  5:25 AM, will monitor as appropriate                # Obesity: Estimated body mass index is 31.06 kg/m  as calculated from the following:    Height as of this encounter: 1.778 m (5' 10\").    Weight as of this encounter: 98.2 kg (216 lb 7.9 oz)., PRESENT ON ADMISSION          Orders Placed This Encounter      NPO for Medical/Clinical Reasons Except for: Ice Chips      DVT Prophylaxis: SCDs, pharmacological DVT prophylaxis per surgical team.  Code Status: Full Code    Medically Ready for Discharge: Anticipated in 2-4 Days    Medical Decision Making   47 MINUTES SPENT BY ME on the date of service doing chart review, history, exam, documentation & further activities per the note.        Tien Javier MD        Interval History:      Lying in bed.  NG tube still in place and still having some coffee-ground output.  Overall, he is feeling better today.  Still NPO.  JANNET drain still in place and reviewed by surgery looks good         Physical Exam:        Physical Exam   Temp:  [98.4  F (36.9  C)-98.9  F (37.2  C)] 98.4  F (36.9  C)  Pulse:  [54-60] 55  Resp:  [16-20] 16  BP: (121-133)/(70-85) 130/70  SpO2:  [88 %-95 %] 94 %    Constitutional: Awake, alert, cooperative, no apparent distress.    HEENT: NGT in place to LIS.  Pulmonary: No increased work of breathing, good air exchange, clear to auscultation bilaterally, no crackles or wheezing.  Cardiovascular: Regular rate and rhythm, normal S1 and S2, " no S3 or S4. No murmurs, rubs, or gallops noted.  GI: Normal bowel sounds, soft, non-distended, non-tender.  No guarding or rebound. JANNET drain in place.  Skin/Integumen: No cyanosis or jaundice. No rashes noted.  Extremities: No lower extremity edema.  Neuro: A&Ox4. Conversant. Responds appropriately in conversation. Moves all extremities with no focal deficit identified.          Medications:        Current Facility-Administered Medications   Medication Dose Route Frequency Provider Last Rate Last Admin    heparin ANTICOAGULANT injection 5,000 Units  5,000 Units Subcutaneous Q12H Ammy Dang MD   5,000 Units at 07/12/25 0630    methocarbamol (ROBAXIN) injection 500 mg  500 mg Intravenous Q8H Radha Serna PA-C   500 mg at 07/12/25 1205    pantoprazole (PROTONIX) injection 40 mg  40 mg Intravenous BID Ammy Dang MD   40 mg at 07/12/25 0946    piperacillin-tazobactam (ZOSYN) 4.5 g vial to attach to  mL bag  4.5 g Intravenous Q6H Rosales White PA-C 200 mL/hr at 07/12/25 1438 4.5 g at 07/12/25 1438    potassium chloride 10 mEq in 100 mL sterile water infusion  10 mEq Intravenous Q1H Tien Javier  mL/hr at 07/12/25 1424 10 mEq at 07/12/25 1424    sodium chloride (PF) 0.9% PF flush 3 mL  3 mL Intracatheter Q8H OTIS Rosales White PA-C   3 mL at 07/09/25 2221     Current Facility-Administered Medications   Medication Dose Route Frequency Provider Last Rate Last Admin    acetaminophen (TYLENOL) Suppository 650 mg  650 mg Rectal Q4H PRN Rosales White PA-C        artificial saliva (BIOTENE MT) solution 1 spray  1 spray Swish & Spit 4x Daily PRN Stevo Langley MD   1 spray at 07/12/25 1200    diazepam (VALIUM) injection 2.5-5 mg  2.5-5 mg Intravenous Q8H PRN Rosales White PA-C   2.5 mg at 07/11/25 2241    hydrALAZINE (APRESOLINE) injection 10 mg  10 mg Intravenous Q4H PRN Rosales White PA-C        HYDROmorphone (DILAUDID) injection 0.2 mg  0.2 mg Intravenous Q2H PRN Kareem,  KAI Garcia        lidocaine (LMX4) cream   Topical Q1H PRN Rosales White PA-C        lidocaine 1 % 0.1-1 mL  0.1-1 mL Other Q1H PRN Rosales White PA-C        naloxone (NARCAN) injection 0.2 mg  0.2 mg Intravenous Q2 Min PRN Rosales White PA-C        Or    naloxone (NARCAN) injection 0.4 mg  0.4 mg Intravenous Q2 Min PRN Rosales White PA-C        Or    naloxone (NARCAN) injection 0.2 mg  0.2 mg Intramuscular Q2 Min PRN Rosales White PA-C        Or    naloxone (NARCAN) injection 0.4 mg  0.4 mg Intramuscular Q2 Min PRN Rosales White PA-C        ondansetron (ZOFRAN) injection 4 mg  4 mg Intravenous Q6H PRN Rosales White PA-C        Or    ondansetron (ZOFRAN ODT) ODT tab 4 mg  4 mg Oral Q6H PRN Rosales White PA-C        prochlorperazine (COMPAZINE) injection 10 mg  10 mg Intravenous Q6H PRN Rosales White PA-C        sodium chloride (OCEAN) 0.65 % nasal spray 1 spray  1 spray Both Nostrils Q1H PRN Stevo Langley MD   1 spray at 07/12/25 0641    sodium chloride (PF) 0.9% PF flush 3 mL  3 mL Intracatheter q1 min prn Rosales White PA-C   3 mL at 07/10/25 2031            Data:      All new lab and imaging data was reviewed.

## 2025-07-12 NOTE — PLAN OF CARE
Goal Outcome Evaluation:      Plan of Care Reviewed With: patient  SUMMARY: History of UC. Presented with abdominal pain, vomiting, and diarrhea. Found to have a new perforation, underwent exploratory laparotomy with repair of duodenal jejunal perforation and abdominal lavage with drain placement on 7/9. POD 3. Transfer from [facility] to  afternoon of 7/12.    DATE & TIME: 7/12/2025, 1009-1292    Cognitive Concerns/ Orientation : A & O x 4, calm and cooperative.    BEHAVIOR & AGGRESSION TOOL COLOR: Green   CIWA SCORE: NA    ABNL VS/O2: VSS on RA, can be dmitri at times- 50s  MOBILITY: SBA, steady, needs help navigating medical equipment.   PAIN MANAGMENT: denies at this time.  DIET: NPO ex small amount of ice chips   BOWEL/BLADDER: BS active x 4, continent. Using urinal at bedside, no voiding concerns at this time, denies any new or progressing abdominal pain, passing flatus, abdomin soft/non distended.   ABNL LAB/BG: K 2.9 (currenly replacing, re-check scheduled for 1900), calcium 8.7, hgb 11.8  DRAIN/DEVICES: x 2 R. PIV, hand PIV infusing IV fluids @ 125 mL and antecubital PIV infusing potassium replacement (currently on bag 5 out of 8). Receiving intermittent IV antibiotics. NG tube at 60 cm to left nare at [facility], output brown, had been putting out high volumes per C and surgery was updated and aware.   TELEMETRY RHYTHM: NA  SKIN: pale complexion, surgical incision to center of abdomin with staples-CDI, JANNET drain site to LLQ, stripping drain Q4hr.   TESTS/PROCEDURES: NA   D/C DATE: pending clinical improvement 2-4 days per MD note.   Discharge Barriers: clinical improvement  OTHER IMPORTANT INFO: GI and surgery following. Wife at bedside and supportive of patient. Medications sent from C placed in patient medication bin. BG Q4hr. CPAP at bedtime for sleep. No new sign and held orders.

## 2025-07-12 NOTE — PLAN OF CARE
Alert and oriented x4. Vital signs stable on RA. Up SBA. Tolerating NPO. Lungs sounds clear. IS up to 1000. Bowel sounds +. Passing flatus, BM -. Voiding adequately. Skin: Midline incision with staples, IJEOMA. NG to LIS at 60cm with brown output. JANNET drain on L abdomen, dressing CDI, strip Q4. Pain managed with Dilaudid 0.5mg PRN. PRN Valium given for sleep- fair results. Denies nausea. Neuro's and CMS intact. IVF infusing.

## 2025-07-12 NOTE — PROGRESS NOTES
[facility]    General Surgery  Daily Progress Note       Assessment and Plan:   Simone Johnson is a 50 year old male with significant past medical history of perforated duodenal ulcer and ventral hernia repair with mesh who presents with diffuse abdominal pain.    Emergent exploratory laparotomy was performed with abdominal lavage and primary repair of duodenal jejunal perforation on 7/9/25.     PLAN:  - Continue plan for gastrografin UGI on Monday.  - NPO except sips of water and ice chips, no meds. Continue IV protonix BID.  - NG to LIS, monitor output- currently thin bilious.  - Continue JANNET drain, monitor output- currently serous and minimal.  - Given hallucinations with dilaudid, did decrease the dose. Added scheduled robaxin. No toradol given perforation and acute blood loss anemia.  - White count wnl 7/10, will recheck tomorrow. Continue IV Zosyn (on Day 4).  - Ambulate QID to assist with bowel function, PCDs for DVT prophylaxis.   - Hgb drift, acute blood loss anemia secondary to surgery and perforation. Will continue heparin for DVT chemoprophylaxis and recheck Hgb tomorrow.  - Encourage deep breathe and IS.   - Medical management per primary team.    DISPOSITION:  - Pending clinical course. Would expect a 1-2 days of diet advancement if UGI wnl on Monday.  - Given Gastrin and Chromogranin A level elevation, will need follow up with hepatobiliary/U of  team or [place] for ongoing workup for possible gastrinoma as an outpatient pending ongoing recovery. Discussed this with patient today.        Interval History:   Siomne Johnson is seen on surgical rounds. He states he's not been able to sleep well due to hallucinations with dilaudid which have been isolated to the past two nights. He had valium in addition to the dilaudid last night. He states abdominal pain has continued to improve. It is 4/10 on pain scale and he has not needed dilaudid today. He questions alternative to  narcotics given NPO status. He continues to take water through sponge with wetting mouth. NG output 4,100 ml yesterday, continues to have ice chips and water via sponge. Passing flatus, no BM per patient but loose BM charted since surgery. Vitals wnl.         Physical Exam:   Temp: 98.9  F (37.2  C) Temp src: Oral BP: 127/75 Pulse: 55   Resp: 16 SpO2: (!) 91 % O2 Device: None (Room air) Oxygen Delivery: 2 LPM    I/O last 3 completed shifts:  In: 4885 [P.O.:1100; I.V.:3085; IV Piggyback:700]  Out: 4966 [Urine:850; Emesis/NG output:4100; Drains:16]    GENERAL: VS reviewed, alert, oriented, no acute distress  LUNGS: Normal respiratory effort, no wheezing  ABDOMEN:  Abdominal binder, soft, only minimally tender at incision otherwise nontender and nondistended. NG output thin bilious output.  INCISION: Staples in place. Incision is clean, dry, and intact. No surrounding erythema.  EXTREMITIES: Moving all extremities, no gross deformities  NEUROLOGICAL: Grossly non-focal, mood & affect appropriate    Data   Recent Labs   Lab 07/12/25  0801 07/12/25  0742 07/12/25  0219 07/11/25  2007 07/11/25  0751 07/11/25  0555 07/11/25  0001 07/10/25  0525 07/10/25  0000 07/09/25  1654 07/09/25  1110 07/09/25  0623 07/08/25  2244 07/08/25  2242   WBC  --   --   --   --   --   --   --  7.8  --  8.1  --  6.1  --  8.9   HGB  --  11.8*  --   --   --  12.7*  --  13.5   < > 15.1  --  17.5  --  16.9   MCV  --  90  --   --   --  87  --  90   < > 89  --  88  88  --  88   PLT  --   --   --   --   --   --   --  252  --  267  --   --   --  326   NA  --  145  --   --   --  145  --  140  --  137  --  136  --  136   POTASSIUM  --  2.9*  --  2.9*  --  3.0*  --  3.5  --  3.7  --  4.0  --  4.0   CHLORIDE  --  101  --   --   --  102  --  100  --  103  --  103  --  101   CO2  --  28  --   --   --  31*  --  29  --  23  --  21*  --  20*   BUN  --  12.0  --   --   --  12.3  --  15.6  --  16.0  --  9.5  --  6.5   CR  --  0.89  --   --   --  1.00  --  1.11   --  1.23*  --  0.99  --  0.86   ANIONGAP  --  16*  --   --   --  12  --  11  --  11  --  12  --  15   NICK  --  8.7*  --   --   --  8.8  --  8.3*  --  8.3*  --  8.3*  --  8.5*   GLC 82 85 82  --    < > 94   < > 113*  --  144*   < > 137*  144*   < > 131*   ALBUMIN  --  2.9*  --   --   --   --   --  2.7*  --   --    < >  --   --  3.6   PROTTOTAL  --  5.8*  --   --   --   --   --  5.6*  --   --    < >  --   --  6.4   BILITOTAL  --  0.3  --   --   --   --   --  0.6  --   --    < >  --   --  0.6   ALKPHOS  --  48  --   --   --   --   --  53  --   --    < >  --   --  56   ALT  --  8  --   --   --   --   --  10  --   --    < >  --   --  12   AST  --  12  --   --   --   --   --  16  --   --    < >  --   --  17    < > = values in this interval not displayed.       Radha Serna PA-C    Please use [facility] to page 7:30am-noon on weekends, page on-call surgeon after noon on weekends  Office number: [phone]

## 2025-07-12 NOTE — PLAN OF CARE
Reason for Admission: POD 3 ex lap w/ perf repair/lavage   Level of Care: Medical     Shift Events:   -Started IV robaxin   -Replacing K     Vitals: VSS ex bradycardia   Telemetry: NA  Pain: C/o abd pain - Scheduled IV robaxin given   Neuro: A&Ox4, pleasant.  Resp: LS clear on room air.   GI/: NPO ex ice. BG stable. Abd soft, +BS, passing gas. -BM this shift. AUO, needs prompting.   Skin/Wounds: Midline abd incision.   Lines/Drains: R PIV infusing 1/2NS w/ 20 Kcl @ 125 mll/hr. IV zosyn. NGT to LIS. Had 1.5 L brown output this shift. Surgery aware of high output volumes. LLQ JANNET drain to bulb suction.   Activity: Ind/SBA   Sleep: Awake   Abnormal Labs: K 2.9, replacing - recheck at 1900.     Plan of Care: Transfer to . RN to RN report given to FAHEEM Díaz.

## 2025-07-13 ENCOUNTER — APPOINTMENT (OUTPATIENT)
Dept: GENERAL RADIOLOGY | Facility: CLINIC | Age: 50
End: 2025-07-13
Attending: PHYSICIAN ASSISTANT
Payer: COMMERCIAL

## 2025-07-13 LAB
ANION GAP SERPL CALCULATED.3IONS-SCNC: 16 MMOL/L (ref 7–15)
BASOPHILS # BLD AUTO: 0 10E3/UL (ref 0–0.2)
BASOPHILS NFR BLD AUTO: 1 %
BUN SERPL-MCNC: 11.6 MG/DL (ref 6–20)
CALCIUM SERPL-MCNC: 8.8 MG/DL (ref 8.8–10.4)
CHLORIDE SERPL-SCNC: 100 MMOL/L (ref 98–107)
CREAT SERPL-MCNC: 0.88 MG/DL (ref 0.67–1.17)
EGFRCR SERPLBLD CKD-EPI 2021: >90 ML/MIN/1.73M2
EOSINOPHIL # BLD AUTO: 0.4 10E3/UL (ref 0–0.7)
EOSINOPHIL NFR BLD AUTO: 5 %
ERYTHROCYTE [DISTWIDTH] IN BLOOD BY AUTOMATED COUNT: 15.7 % (ref 10–15)
GLUCOSE BLDC GLUCOMTR-MCNC: 84 MG/DL (ref 70–99)
GLUCOSE SERPL-MCNC: 83 MG/DL (ref 70–99)
HCO3 SERPL-SCNC: 27 MMOL/L (ref 22–29)
HCT VFR BLD AUTO: 36.5 % (ref 40–53)
HGB BLD-MCNC: 12 G/DL (ref 13.3–17.7)
IMM GRANULOCYTES # BLD: 0 10E3/UL
IMM GRANULOCYTES NFR BLD: 0 %
LYMPHOCYTES # BLD AUTO: 1 10E3/UL (ref 0.8–5.3)
LYMPHOCYTES NFR BLD AUTO: 15 %
MAGNESIUM SERPL-MCNC: 1.8 MG/DL (ref 1.7–2.3)
MCH RBC QN AUTO: 30 PG (ref 26.5–33)
MCHC RBC AUTO-ENTMCNC: 32.9 G/DL (ref 31.5–36.5)
MCV RBC AUTO: 91 FL (ref 78–100)
MONOCYTES # BLD AUTO: 0.8 10E3/UL (ref 0–1.3)
MONOCYTES NFR BLD AUTO: 11 %
NEUTROPHILS # BLD AUTO: 4.6 10E3/UL (ref 1.6–8.3)
NEUTROPHILS NFR BLD AUTO: 67 %
NRBC # BLD AUTO: 0 10E3/UL
NRBC BLD AUTO-RTO: 0 /100
PHOSPHATE SERPL-MCNC: 3 MG/DL (ref 2.5–4.5)
PLATELET # BLD AUTO: 294 10E3/UL (ref 150–450)
POTASSIUM SERPL-SCNC: 3.2 MMOL/L (ref 3.4–5.3)
POTASSIUM SERPL-SCNC: 3.3 MMOL/L (ref 3.4–5.3)
POTASSIUM SERPL-SCNC: 4.1 MMOL/L (ref 3.4–5.3)
RBC # BLD AUTO: 4 10E6/UL (ref 4.4–5.9)
SODIUM SERPL-SCNC: 143 MMOL/L (ref 135–145)
WBC # BLD AUTO: 6.9 10E3/UL (ref 4–11)

## 2025-07-13 PROCEDURE — 120N000001 HC R&B MED SURG/OB

## 2025-07-13 PROCEDURE — 999N000065 XR ABDOMEN 1 VIEW

## 2025-07-13 PROCEDURE — 85004 AUTOMATED DIFF WBC COUNT: CPT | Performed by: PHYSICIAN ASSISTANT

## 2025-07-13 PROCEDURE — 84132 ASSAY OF SERUM POTASSIUM: CPT | Performed by: STUDENT IN AN ORGANIZED HEALTH CARE EDUCATION/TRAINING PROGRAM

## 2025-07-13 PROCEDURE — 99232 SBSQ HOSP IP/OBS MODERATE 35: CPT | Performed by: STUDENT IN AN ORGANIZED HEALTH CARE EDUCATION/TRAINING PROGRAM

## 2025-07-13 PROCEDURE — 250N000011 HC RX IP 250 OP 636: Performed by: SURGERY

## 2025-07-13 PROCEDURE — 250N000011 HC RX IP 250 OP 636: Performed by: STUDENT IN AN ORGANIZED HEALTH CARE EDUCATION/TRAINING PROGRAM

## 2025-07-13 PROCEDURE — 999N000128 HC STATISTIC PERIPHERAL IV START W/O US GUIDANCE

## 2025-07-13 PROCEDURE — 999N000157 HC STATISTIC RCP TIME EA 10 MIN

## 2025-07-13 PROCEDURE — 250N000011 HC RX IP 250 OP 636: Performed by: PHYSICIAN ASSISTANT

## 2025-07-13 PROCEDURE — 250N000013 HC RX MED GY IP 250 OP 250 PS 637: Performed by: STUDENT IN AN ORGANIZED HEALTH CARE EDUCATION/TRAINING PROGRAM

## 2025-07-13 PROCEDURE — 36415 COLL VENOUS BLD VENIPUNCTURE: CPT | Performed by: STUDENT IN AN ORGANIZED HEALTH CARE EDUCATION/TRAINING PROGRAM

## 2025-07-13 PROCEDURE — 84100 ASSAY OF PHOSPHORUS: CPT | Performed by: SURGERY

## 2025-07-13 PROCEDURE — 80048 BASIC METABOLIC PNL TOTAL CA: CPT | Performed by: STUDENT IN AN ORGANIZED HEALTH CARE EDUCATION/TRAINING PROGRAM

## 2025-07-13 PROCEDURE — 83735 ASSAY OF MAGNESIUM: CPT | Performed by: SURGERY

## 2025-07-13 PROCEDURE — 36415 COLL VENOUS BLD VENIPUNCTURE: CPT | Performed by: SURGERY

## 2025-07-13 RX ORDER — POTASSIUM CHLORIDE 7.45 MG/ML
10 INJECTION INTRAVENOUS
Status: COMPLETED | OUTPATIENT
Start: 2025-07-13 | End: 2025-07-13

## 2025-07-13 RX ORDER — CARBOXYMETHYLCELLULOSE SODIUM 5 MG/ML
1 SOLUTION/ DROPS OPHTHALMIC
Status: DISCONTINUED | OUTPATIENT
Start: 2025-07-13 | End: 2025-07-16 | Stop reason: HOSPADM

## 2025-07-13 RX ADMIN — POTASSIUM CHLORIDE 10 MEQ: 7.46 INJECTION, SOLUTION INTRAVENOUS at 08:08

## 2025-07-13 RX ADMIN — PIPERACILLIN AND TAZOBACTAM 4.5 G: 4; .5 INJECTION, POWDER, FOR SOLUTION INTRAVENOUS at 19:39

## 2025-07-13 RX ADMIN — Medication 1 SPRAY: at 19:39

## 2025-07-13 RX ADMIN — PANTOPRAZOLE SODIUM 40 MG: 40 INJECTION, POWDER, FOR SOLUTION INTRAVENOUS at 08:12

## 2025-07-13 RX ADMIN — Medication 1 SPRAY: at 10:32

## 2025-07-13 RX ADMIN — POTASSIUM CHLORIDE 10 MEQ: 7.46 INJECTION, SOLUTION INTRAVENOUS at 01:12

## 2025-07-13 RX ADMIN — POTASSIUM CHLORIDE AND SODIUM CHLORIDE: 450; 150 INJECTION, SOLUTION INTRAVENOUS at 09:04

## 2025-07-13 RX ADMIN — POTASSIUM CHLORIDE 10 MEQ: 7.46 INJECTION, SOLUTION INTRAVENOUS at 13:05

## 2025-07-13 RX ADMIN — POTASSIUM CHLORIDE AND SODIUM CHLORIDE: 450; 150 INJECTION, SOLUTION INTRAVENOUS at 17:33

## 2025-07-13 RX ADMIN — POTASSIUM CHLORIDE 10 MEQ: 7.46 INJECTION, SOLUTION INTRAVENOUS at 10:26

## 2025-07-13 RX ADMIN — PANTOPRAZOLE SODIUM 40 MG: 40 INJECTION, POWDER, FOR SOLUTION INTRAVENOUS at 20:09

## 2025-07-13 RX ADMIN — PIPERACILLIN AND TAZOBACTAM 4.5 G: 4; .5 INJECTION, POWDER, FOR SOLUTION INTRAVENOUS at 08:18

## 2025-07-13 RX ADMIN — PIPERACILLIN AND TAZOBACTAM 4.5 G: 4; .5 INJECTION, POWDER, FOR SOLUTION INTRAVENOUS at 01:12

## 2025-07-13 RX ADMIN — POTASSIUM CHLORIDE 10 MEQ: 7.46 INJECTION, SOLUTION INTRAVENOUS at 11:47

## 2025-07-13 RX ADMIN — HEPARIN SODIUM 5000 UNITS: 5000 INJECTION, SOLUTION INTRAVENOUS; SUBCUTANEOUS at 17:30

## 2025-07-13 RX ADMIN — POTASSIUM CHLORIDE AND SODIUM CHLORIDE: 450; 150 INJECTION, SOLUTION INTRAVENOUS at 00:15

## 2025-07-13 RX ADMIN — CARBOXYMETHYLCELLULOSE SODIUM 1 DROP: 5 SOLUTION/ DROPS OPHTHALMIC at 14:17

## 2025-07-13 RX ADMIN — HEPARIN SODIUM 5000 UNITS: 5000 INJECTION, SOLUTION INTRAVENOUS; SUBCUTANEOUS at 05:48

## 2025-07-13 RX ADMIN — POTASSIUM CHLORIDE 10 MEQ: 7.46 INJECTION, SOLUTION INTRAVENOUS at 00:14

## 2025-07-13 RX ADMIN — PIPERACILLIN AND TAZOBACTAM 4.5 G: 4; .5 INJECTION, POWDER, FOR SOLUTION INTRAVENOUS at 14:16

## 2025-07-13 ASSESSMENT — ACTIVITIES OF DAILY LIVING (ADL)
ADLS_ACUITY_SCORE: 59

## 2025-07-13 NOTE — PROVIDER NOTIFICATION
Patient HR 45, other vitals stable. patient asymptomatic.  Updated Dr Owen on the above via [employer].  Per DR Owen, monitor for symptoms.   No

## 2025-07-13 NOTE — PROVIDER NOTIFICATION
Patient refused packet potassium. patient reported that Dr Dang ([facility]) told him nothing by mouth and nothing should enter his stomach for now. patient requesting to have iv potassium tomorrow morning because he want to have some sleep. Paged Dr Peterson regarding above via [facility].  Dr Peterson requested me to do verbal order for potassium replacement protocol.

## 2025-07-13 NOTE — PLAN OF CARE
Goal Outcome Evaluation:                          SUMMARY: History of UC. Presented with abdominal pain, vomiting, and diarrhea. Found to have a new perforation, underwent exploratory laparotomy with repair of duodenal jejunal perforation and abdominal lavage with drain placement on 7/9. POD 3. [place] [facility] [place]  afternoon of 7/12.    DATE & TIME: 7/13/2025, Night shift.    Cognitive Concerns/ Orientation : A & O x 4, calm and cooperative.    BEHAVIOR & AGGRESSION TOOL COLOR: Green   CIWA SCORE: NA    ABNL VS/O2: VSS except HR 45 ( oncall  updated, monitor for symptoms), on RA.  MOBILITY: Patient steady on feet. SBA due to iv infusing and NG tube in place. Calls appropriately.  PAIN MANAGMENT: Denies. Refused schedule Robaxin.  DIET: NPO ex small amount of ice chips   BOWEL/BLADDER: Continent. Ambulating to the bathroom. BS active x 4 passing flatus, abdomen soft/non distended. Had large formed stool.   ABNL LAB/BG: K 3.3, iv replacement started, to be recheck this morning. BG 84.  DRAIN/DEVICES: x 2 R. PIV, hand PIV infusing IV fluids @ 125 mL and antecubital SL. Receiving intermittent IV antibiotics. NG tube at 60 cm to left nare at LIS, with 650cc brown output.  TELEMETRY RHYTHM: NA  SKIN: pale complexion, surgical incision to center of abdomin with staples-CDI, JANNET drain site to LLQ, stripping drain Q4hr, 6cc pink tinged/srous  output emptied. JANNET site dressing CDI.  TESTS/PROCEDURES: X ray abdomen to be done this morning.  D/C DATE: pending clinical improvement.  Discharge Barriers: clinical improvement  OTHER IMPORTANT INFO:  GI following. Surgery following, plan for gastrografin UGI on Monday. Patient has patient care order to replace electrolyte iv until after UGI results Monday. Patient has order for CPAP at bedtime. Patient reported he does not like hospital CPAP. Patient said he will tell his wife to bring his home CPAP today.

## 2025-07-13 NOTE — PROGRESS NOTES
[facility]  Hospitalist Progress Note   07/13/2025          Assessment and Plan:       Simone Johnson is a 50 year old  male with medical history of history of ulcerative colitis (details not available in epic), history of contained duodenal ulcer admitted on 7/8/2025 with abdominal pain, vomiting, diarrhea.    -seen in ED on 7/6 for nausea, vomiting, diarrhea.  In ED WBC 13.5, electrolytes within normal limits, lipase 374.  Patient had received 1 L fluid bolus, IV Zofran with which symptoms improved, discharged home with close follow-up.       Status post emergent exploratory laparotomy with repair of duodenal jejunal perforation and abdominal lavage with drain placement 7/9/2025  Status post proximal jejunum perforation with small volume pneumoperitoneum.  Acute abdominal pain likely from enteritis, early small bowel obstruction on admission.  --Reported diffuse abdominal pain, multiple episodes of loose watery diarrhea, vomiting nonbloody.  Little relief of pain with pantoprazole, dicyclomine, Zofran at home prompting ER evaluation.  -WBC 8.4.  CRP less than 3.  Lipase 259.  UA No concerns for infection. Sodium 136, creatinine 1.01, potassium 3.6.  Liver enzymes within normal limits.   -CT aortic survey 7/8 consistent with a nonspecific proximal enteritis. fluid-filled loops of small bowel throughout the abdomen, borderline distended in the pelvis with gradual narrowing of distal small bowel caliber. Developing obstructive process not excluded.  ---Patient was admitted to the observation unit.  GI, general surgery followed.  Recommended bowel rest, conservative management.  N.p.o., IV fluids, IV pantoprazole, stool studies ordered.   --- On 7/8 night patient had RRT for worsening abdominal pain. Stat CT imaging with New perforation of the severely inflamed proximal jejunum in the left upper quadrant with small volume pneumoperitoneum, regional peritonitis, and increased volume of ascites.  --  Underwent emergent exploratory laparotomy with repair of duodenal jejunal perforation and abdominal lavage with drain placement on 7/9   1.5 L of succus throughout the abdomen.   --7/10 postop abdominal pain.  Yates catheter removed.   --7/11 patient continues to have coffee-ground colored output from NG (high output likely from ice chips, water with sponge] JANNET drain with serosanguineous output.  Passing gas.  Voiding.   -- Will defer postoperative care including pharmacological DVT prophylaxis to surgical team.  Appreciate general surgery comanagement.   - Discussed TPN with surgery who agree. However, patient wants to hold off given it would require a PICC line. If we cannot get diet restarted on Monday, 7/14, will revisit starting TPN with him as he has not had any nutrition since 7/8/2025.  - Plan for gastrografin UGI tomorrow per surgery  - NG tube to low intermittent suction.  Closely monitor output.  - Continue NPO.  - Continue IV fluids.  - Continue IV PPI twice daily.  - Continue IV Zosyn every 6 hours [7/9].  - As needed antiemetics.  - IV as needed pain meds.  - Encourage ambulation.    Acute hypoxic respiratory failure -multifactorial secondary to pain, narcotic use, underlying CHACHA, resolved  Sinus tachycardia -improved  Left shoulder pain improved   Elevated blood pressures without diagnosis of hypertension - improving   Report of shortness of breath, noted hypoxia.  EKG on admission sinus bradycardia, heart rate 54.  No acute ischemic changes.  Repeat EKG showing sinus tachycardia.  Troponin high-sensitivity 6 < 6.  TSH, magnesium within normal limits.  CT aortic survey from ED-no central pulmonary embolism.  Aggressive incentive spirometry.  As needed IV hydralazine ordered.    Acute anemia likely dilutional.  Presented with hemoglobin of 15.7, now dropped to 12.7.   No signs of active bleeding.  Noted coffee-ground emesis from NG, high output likely from ice chips, water via sponge.  Monitor  "hemoglobin level in a.m. or earlier if symptomatic     Hyperglycemia, prediabetes with a hemoglobin A1c of 5.7.  Monitor blood sugars, if elevated will consider sliding scale insulin.    Hypoalbuminemia likely dilutional, acute illness.  Hypocalcemia likely dilutional.  Monitor.    Hypokalemia.  Potassium replacement protocol ordered  Switch maintenance fluids to fluids with potassium.          Clinically Significant Risk Factors        # Hypokalemia: Lowest K = 2.9 mmol/L in last 2 days, will replace as needed        # Hypoalbuminemia: Lowest albumin = 2.7 g/dL at 7/10/2025  5:25 AM, will monitor as appropriate                # Obesity: Estimated body mass index is 31.06 kg/m  as calculated from the following:    Height as of this encounter: 1.778 m (5' 10\").    Weight as of this encounter: 98.2 kg (216 lb 7.9 oz).           Orders Placed This Encounter      NPO for Medical/Clinical Reasons Except for: Ice Chips      DVT Prophylaxis: SCDs, pharmacological DVT prophylaxis per surgical team.  Code Status: Full Code    Medically Ready for Discharge: Anticipated in 2-4 Days pending diet advancement, removal of NGT and  plan for JANNET drain    Medical Decision Making   43 MINUTES SPENT BY ME on the date of service doing chart review, history, exam, documentation & further activities per the note.        Tien Javier MD        Interval History:      Sitting in chair.  Tired of getting IV potassium replacement, excited to advance diet so he can take oral potassium supplementation instead.  Understands plan for upper GI Gastrografin series tomorrow and hopeful advancement of diet if all is well.  He has no other questions or concerns at this time.  NG tube still in place and still having some coffee-ground output.          Physical Exam:        Physical Exam   Temp:  [97.7  F (36.5  C)-98.7  F (37.1  C)] 98.7  F (37.1  C)  Pulse:  [45-56] 54  Resp:  [16] 16  BP: (117-141)/(57-80) 141/80  SpO2:  [93 %-96 %] 96 " %    Constitutional: Awake, alert, cooperative, no apparent distress.    HEENT: NGT in place to LIS, dark output noted in canister.  Pulmonary: No increased work of breathing, good air exchange, clear to auscultation bilaterally, no crackles or wheezing.  Cardiovascular: Regular rate and rhythm, normal S1 and S2, no S3 or S4. No murmurs, rubs, or gallops noted.  GI: Normal bowel sounds, soft, non-distended, non-tender.  No guarding or rebound. JANNET drain in place.  Skin/Integumen: No cyanosis or jaundice. No rashes noted.  Extremities: No lower extremity edema.  Neuro: A&Ox4. Conversant. Responds appropriately in conversation. Moves all extremities with no focal deficit identified.          Medications:        Current Facility-Administered Medications   Medication Dose Route Frequency Provider Last Rate Last Admin    heparin ANTICOAGULANT injection 5,000 Units  5,000 Units Subcutaneous Q12H Ammy Dang MD   5,000 Units at 07/13/25 0548    methocarbamol (ROBAXIN) injection 500 mg  500 mg Intravenous Q8H Radha Serna PA-C   500 mg at 07/12/25 2108    pantoprazole (PROTONIX) injection 40 mg  40 mg Intravenous BID Ammy Dang MD   40 mg at 07/13/25 0812    piperacillin-tazobactam (ZOSYN) 4.5 g vial to attach to  mL bag  4.5 g Intravenous Q6H Rosales White PA-C 200 mL/hr at 07/13/25 1416 4.5 g at 07/13/25 1416    sodium chloride (PF) 0.9% PF flush 3 mL  3 mL Intracatheter Q8H [facility] Rosales White PA-C   3 mL at 07/13/25 1425     Current Facility-Administered Medications   Medication Dose Route Frequency Provider Last Rate Last Admin    acetaminophen (TYLENOL) Suppository 650 mg  650 mg Rectal Q4H PRN Rosales White PA-C        artificial saliva (BIOTENE MT) solution 1 spray  1 spray Swish & Spit 4x Daily PRN Stevo Langley MD   1 spray at 07/13/25 1032    carboxymethylcellulose PF (REFRESH PLUS) 0.5 % ophthalmic solution 1 drop  1 drop Both Eyes Q1H PRN Tien Javier MD   1 drop at  07/13/25 1417    diazepam (VALIUM) injection 2.5-5 mg  2.5-5 mg Intravenous Q8H PRN Rosales White PA-C   2.5 mg at 07/11/25 2241    hydrALAZINE (APRESOLINE) injection 10 mg  10 mg Intravenous Q4H PRN Rosales White PA-C        HYDROmorphone (DILAUDID) injection 0.2 mg  0.2 mg Intravenous Q2H PRN Radha Serna PA-C        lidocaine (LMX4) cream   Topical Q1H PRN Rosales White PA-C        lidocaine 1 % 0.1-1 mL  0.1-1 mL Other Q1H PRN Rosales White PA-C        naloxone (NARCAN) injection 0.2 mg  0.2 mg Intravenous Q2 Min PRN Rosales White PA-C        Or    naloxone (NARCAN) injection 0.4 mg  0.4 mg Intravenous Q2 Min PRN Rosales White PA-C        Or    naloxone (NARCAN) injection 0.2 mg  0.2 mg Intramuscular Q2 Min PRN Rosales White PA-C        Or    naloxone (NARCAN) injection 0.4 mg  0.4 mg Intramuscular Q2 Min PRN Rosales White PA-C        ondansetron (ZOFRAN) injection 4 mg  4 mg Intravenous Q6H PRN Rosales White PA-C        Or    ondansetron (ZOFRAN ODT) ODT tab 4 mg  4 mg Oral Q6H PRN Rosales White PA-C        prochlorperazine (COMPAZINE) injection 10 mg  10 mg Intravenous Q6H PRN Rosales White PA-C        sodium chloride (OCEAN) 0.65 % nasal spray 1 spray  1 spray Both Nostrils Q1H PRN Stevo Langley MD   1 spray at 07/13/25 1032    sodium chloride (PF) 0.9% PF flush 3 mL  3 mL Intracatheter q1 min prn Rosales White PA-C   3 mL at 07/10/25 2031            Data:      All new lab and imaging data was reviewed.

## 2025-07-13 NOTE — PROVIDER NOTIFICATION
Patient is refusing oral potassium. patient on NG tube for decompression. Paged Dr Peterson via ryley on the these.

## 2025-07-13 NOTE — PLAN OF CARE
Goal Outcome Evaluation:                          SUMMARY: History of UC. Presented with abdominal pain, vomiting, and diarrhea. Found to have a new perforation, underwent exploratory laparotomy with repair of duodenal jejunal perforation and abdominal lavage with drain placement on 7/9. POD 3. [place] [facility] [place]  afternoon of 7/12.    DATE & TIME: 7/12/2025, pm shift.    Cognitive Concerns/ Orientation : A & O x 4, calm and cooperative.    BEHAVIOR & AGGRESSION TOOL COLOR: Green   CIWA SCORE: NA    ABNL VS/O2: VSS on RA, can be dmitri at times- 50s.  MOBILITY: SBA, steady. Up in chair.  PAIN MANAGMENT: Denies. On schedule iv Robaxin.  DIET: NPO ex small amount of ice chips   BOWEL/BLADDER: Continent. Using urinal to void. BS active x 4 passing flatus, abdomin soft/non distended. No bm.  ABNL LAB/BG: K 3.3, iv replacement started, to be recheck tomorrow morning. calcium 8.7, hgb 11.8. BG 81 and 80.  DRAIN/DEVICES: x 2 R. PIV, hand PIV infusing IV fluids @ 125 mL and antecubital SL. Receiving intermittent IV antibiotics. NG tube at 60 cm to left nare at LIS, with 900cc brown output.  TELEMETRY RHYTHM: NA  SKIN: pale complexion, surgical incision to center of abdomin with staples-CDI, JANNET drain site to LLQ, stripping drain Q4hr, 2 cc serosanguinous output emptied. JANNET site dressing CDI.  TESTS/PROCEDURES: NA  D/C DATE: pending clinical improvement 2-4 days per MD note.   Discharge Barriers: clinical improvement  OTHER IMPORTANT INFO:  GI following. Surgery following, plan for gastrografin UGI on Monday. Patient has patient care order to replace electrolyte iv until after UGI results Monday.

## 2025-07-13 NOTE — PLAN OF CARE
Goal Outcome Evaluation:      Plan of Care Reviewed With: patient  SUMMARY: History of UC. Presented with abdominal pain, vomiting, and diarrhea. Found to have a new perforation, underwent exploratory laparotomy with repair of duodenal jejunal perforation and abdominal lavage with drain placement on 7/9. POD 3. [place] [facility] [place]  afternoon of 7/12.    DATE & TIME: 7/14/2025, [id number]    Cognitive Concerns/ Orientation : A & O x 4, calm and cooperative.    BEHAVIOR & AGGRESSION TOOL COLOR: Green   CIWA SCORE: NA    ABNL VS/O2: VSS except HR 50s (asymptomatic) on RA.  MOBILITY: Patient steady on feet. SBA due to iv infusing and NG tube in place. Calls appropriately. Per MD okay to ambulate in halls with NG tube clamped.   PAIN MANAGMENT: Denies. Refused schedule Robaxin.  DIET: NPO ex small amount of ice chips   BOWEL/BLADDER: Continent. Ambulating to the bathroom. BS active x 4 passing flatus, abdomen soft/non distended. Had x 2 large formed brown stool today.   ABNL LAB/BG: K 3.2 (replaced IV x 4 bags, re-check at 1615 this afternoon), hgb 12.0  DRAIN/DEVICES: x 2 R. PIV, hand PIV infusing IV fluids @ 125 mL with intermittent IV antibiotics. New right lower forearm used for potassium replacement today, now SL. NG tube at 60 cm to left nare at LIS, NG tube dressing loosened after patient sneezed new tape applied to nare due to discomfort, no new change in pain or nausea, site placement assessed, 300 mL dark brown output.  TELEMETRY RHYTHM: NA  SKIN: pale complexion, surgical incision to center of abdomin with staples-CDI, JANNET drain site to LLQ, minimal output noted. JANNET site dressing CDI. Abdominal binder used intermittently by patient.   TESTS/PROCEDURES: X ray abdomen to be done this morning.  D/C DATE: pending clinical improvement.  Discharge Barriers: clinical improvement  OTHER IMPORTANT INFO:  GI following. Surgery following, plan for gastrografin UGI on Monday. Patient has patient care order to replace  electrolyte iv until after UGI results Monday. PRN nasal spray, biotene spray, and eye drops provided. Patient has order for CPAP at bedtime-patient uses home CPAP. Family at bedside and supportive of patient.

## 2025-07-13 NOTE — PROGRESS NOTES
[facility]    General Surgery  Daily Progress Note       Assessment and Plan:   Simone Johnson is a 50 year old male with significant past medical history of perforated duodenal ulcer and ventral hernia repair with mesh who presents with diffuse abdominal pain.    Emergent exploratory laparotomy was performed with abdominal lavage and primary repair of duodenal jejunal perforation on 7/9/25.     PLAN:  - Plan for gastrografin UGI tomorrow, ordered.  - NPO except sips of water and ice chips, no meds. Continue IV protonix BID. Patient declined initiation of TPN given possible diet initiation tomorrow.   - NG to LIS, monitor output- currently thin bilious and output coming down. Having BM's.  - Continue JANNET drain, monitor output- currently serous and minimal.  - Avoid narcotics given hallucinations, pain now better controlled. Continue scheduled robaxin. No toradol given perforation and acute blood loss anemia.  - White count wnl 7/10, will recheck tomorrow. Continue IV Zosyn (on Day 4).  - Ambulate QID to assist with bowel function, PCDs for DVT prophylaxis.   - Acute blood loss anemia secondary to surgery and perforation, now stable. Okay to continue heparin for DVT chemoprophylaxis.  - Encourage deep breathe and IS.   - Medical management per primary team.    DISPOSITION:  - Pending clinical course. Would expect 2 days given diet advancement if UGI wnl tomorrow.  - Given Gastrin and Chromogranin A level elevation, will need follow up with hepatobiliary/U of  team or [place] for ongoing workup for possible gastrinoma as an outpatient pending ongoing recovery. Discussed this with patient on 7/12.        Interval History:   Simone Johnson is seen on surgical rounds with wife and sister present. No longer taking dilaudid so hallucinations ceased overnight. Unfortunately up overnight due to IV potassium replacement. Otherwise feeling pretty good today. Abdominal pain continues to improve.  Moving around well. Two BM's today, loose but otherwise normal. XR this morning with NG in good position. Voiding. Hypertensive, vitals otherwise wnl.         Physical Exam:   Temp: 98.7  F (37.1  C) Temp src: Oral BP: (!) 141/80 Pulse: 54   Resp: 16 SpO2: 96 % O2 Device: None (Room air)      I/O last 3 completed shifts:  In: 1117 [I.V.:817; NG/GT:300]  Out: 2158 [Urine:400; Emesis/NG output:1750; Drains:8]    GENERAL: VS reviewed, alert, oriented, no acute distress  LUNGS: Normal respiratory effort, no wheezing  ABDOMEN:  Abdomen soft, only minimally tender at incision otherwise nontender and nondistended. JANNET drain 8 ml/24 hours serous output, NG output thin dark bilious output.  INCISION: Staples in place. Incision is clean, dry, and intact. No surrounding erythema.  EXTREMITIES: Moving all extremities, no gross deformities  NEUROLOGICAL: Grossly non-focal, mood & affect appropriate    Data   Recent Labs   Lab 07/13/25  0527 07/13/25  0219 07/12/25  2119 07/12/25  1907 07/12/25  0801 07/12/25  0742 07/11/25  0751 07/11/25  0555 07/11/25  0001 07/10/25  0525 07/10/25  0000 07/09/25  1654   WBC 6.9  --   --   --   --   --   --   --   --  7.8  --  8.1   HGB 12.0*  --   --   --   --  11.8*  --  12.7*  --  13.5   < > 15.1   MCV 91  --   --   --   --  90  --  87  --  90   < > 89     --   --   --   --   --   --   --   --  252  --  267     --   --   --   --  145  --  145  --  140  --  137   POTASSIUM 3.3*  3.2*  --   --  3.3*  --  2.9*   < > 3.0*  --  3.5  --  3.7   CHLORIDE 100  --   --   --   --  101  --  102  --  100  --  103   CO2 27  --   --   --   --  28  --  31*  --  29  --  23   BUN 11.6  --   --   --   --  12.0  --  12.3  --  15.6  --  16.0   CR 0.88  --   --   --   --  0.89  --  1.00  --  1.11  --  1.23*   ANIONGAP 16*  --   --   --   --  16*  --  12  --  11  --  11   NICK 8.8  --   --   --   --  8.7*  --  8.8  --  8.3*  --  8.3*   GLC 83 84 80  --    < > 85   < > 94   < > 113*  --  144*   ALBUMIN   --   --   --   --   --  2.9*  --   --   --  2.7*  --   --    PROTTOTAL  --   --   --   --   --  5.8*  --   --   --  5.6*  --   --    BILITOTAL  --   --   --   --   --  0.3  --   --   --  0.6  --   --    ALKPHOS  --   --   --   --   --  48  --   --   --  53  --   --    ALT  --   --   --   --   --  8  --   --   --  10  --   --    AST  --   --   --   --   --  12  --   --   --  16  --   --     < > = values in this interval not displayed.       Radha Serna PA-C    Please use [place] to page. If not logged in, page on-call surgeon.  Office number: [phone]

## 2025-07-14 ENCOUNTER — APPOINTMENT (OUTPATIENT)
Dept: GENERAL RADIOLOGY | Facility: CLINIC | Age: 50
End: 2025-07-14
Attending: SURGERY
Payer: COMMERCIAL

## 2025-07-14 LAB
ANION GAP SERPL CALCULATED.3IONS-SCNC: 18 MMOL/L (ref 7–15)
B-OH-BUTYR SERPL-SCNC: 0.52 MMOL/L
B-OH-BUTYR SERPL-SCNC: 4.12 MMOL/L
BUN SERPL-MCNC: 10.1 MG/DL (ref 6–20)
CALCIUM SERPL-MCNC: 8.3 MG/DL (ref 8.8–10.4)
CHLORIDE SERPL-SCNC: 100 MMOL/L (ref 98–107)
CREAT SERPL-MCNC: 0.84 MG/DL (ref 0.67–1.17)
EGFRCR SERPLBLD CKD-EPI 2021: >90 ML/MIN/1.73M2
ERYTHROCYTE [DISTWIDTH] IN BLOOD BY AUTOMATED COUNT: 15.3 % (ref 10–15)
GLUCOSE SERPL-MCNC: 76 MG/DL (ref 70–99)
HCO3 SERPL-SCNC: 25 MMOL/L (ref 22–29)
HCT VFR BLD AUTO: 37.7 % (ref 40–53)
HGB BLD-MCNC: 12.7 G/DL (ref 13.3–17.7)
LACTATE SERPL-SCNC: 0.9 MMOL/L (ref 0.7–2)
MAGNESIUM SERPL-MCNC: 1.7 MG/DL (ref 1.7–2.3)
MCH RBC QN AUTO: 30 PG (ref 26.5–33)
MCHC RBC AUTO-ENTMCNC: 33.7 G/DL (ref 31.5–36.5)
MCV RBC AUTO: 89 FL (ref 78–100)
PHOSPHATE SERPL-MCNC: 3.6 MG/DL (ref 2.5–4.5)
PLATELET # BLD AUTO: 324 10E3/UL (ref 150–450)
POTASSIUM SERPL-SCNC: 3.3 MMOL/L (ref 3.4–5.3)
POTASSIUM SERPL-SCNC: 3.4 MMOL/L (ref 3.4–5.3)
POTASSIUM SERPL-SCNC: 3.6 MMOL/L (ref 3.4–5.3)
RBC # BLD AUTO: 4.23 10E6/UL (ref 4.4–5.9)
SODIUM SERPL-SCNC: 143 MMOL/L (ref 135–145)
WBC # BLD AUTO: 7.4 10E3/UL (ref 4–11)

## 2025-07-14 PROCEDURE — 120N000001 HC R&B MED SURG/OB

## 2025-07-14 PROCEDURE — 82010 KETONE BODYS QUAN: CPT | Performed by: STUDENT IN AN ORGANIZED HEALTH CARE EDUCATION/TRAINING PROGRAM

## 2025-07-14 PROCEDURE — 83605 ASSAY OF LACTIC ACID: CPT | Performed by: STUDENT IN AN ORGANIZED HEALTH CARE EDUCATION/TRAINING PROGRAM

## 2025-07-14 PROCEDURE — 83735 ASSAY OF MAGNESIUM: CPT | Performed by: STUDENT IN AN ORGANIZED HEALTH CARE EDUCATION/TRAINING PROGRAM

## 2025-07-14 PROCEDURE — 99232 SBSQ HOSP IP/OBS MODERATE 35: CPT | Performed by: STUDENT IN AN ORGANIZED HEALTH CARE EDUCATION/TRAINING PROGRAM

## 2025-07-14 PROCEDURE — 250N000011 HC RX IP 250 OP 636: Performed by: SURGERY

## 2025-07-14 PROCEDURE — 84100 ASSAY OF PHOSPHORUS: CPT | Performed by: STUDENT IN AN ORGANIZED HEALTH CARE EDUCATION/TRAINING PROGRAM

## 2025-07-14 PROCEDURE — 250N000011 HC RX IP 250 OP 636: Performed by: PHYSICIAN ASSISTANT

## 2025-07-14 PROCEDURE — 84132 ASSAY OF SERUM POTASSIUM: CPT | Performed by: STUDENT IN AN ORGANIZED HEALTH CARE EDUCATION/TRAINING PROGRAM

## 2025-07-14 PROCEDURE — 250N000013 HC RX MED GY IP 250 OP 250 PS 637: Performed by: STUDENT IN AN ORGANIZED HEALTH CARE EDUCATION/TRAINING PROGRAM

## 2025-07-14 PROCEDURE — 36415 COLL VENOUS BLD VENIPUNCTURE: CPT | Performed by: STUDENT IN AN ORGANIZED HEALTH CARE EDUCATION/TRAINING PROGRAM

## 2025-07-14 PROCEDURE — 85027 COMPLETE CBC AUTOMATED: CPT | Performed by: STUDENT IN AN ORGANIZED HEALTH CARE EDUCATION/TRAINING PROGRAM

## 2025-07-14 PROCEDURE — 258N000003 HC RX IP 258 OP 636: Performed by: HOSPITALIST

## 2025-07-14 PROCEDURE — 74240 X-RAY XM UPR GI TRC 1CNTRST: CPT

## 2025-07-14 PROCEDURE — 80048 BASIC METABOLIC PNL TOTAL CA: CPT | Performed by: STUDENT IN AN ORGANIZED HEALTH CARE EDUCATION/TRAINING PROGRAM

## 2025-07-14 RX ORDER — POTASSIUM CHLORIDE 1500 MG/1
40 TABLET, EXTENDED RELEASE ORAL ONCE
Status: COMPLETED | OUTPATIENT
Start: 2025-07-14 | End: 2025-07-14

## 2025-07-14 RX ORDER — DEXTROSE MONOHYDRATE, SODIUM CHLORIDE, AND POTASSIUM CHLORIDE 50; 1.49; 4.5 G/1000ML; G/1000ML; G/1000ML
INJECTION, SOLUTION INTRAVENOUS CONTINUOUS
Status: DISCONTINUED | OUTPATIENT
Start: 2025-07-14 | End: 2025-07-15

## 2025-07-14 RX ORDER — POTASSIUM CHLORIDE 7.45 MG/ML
10 INJECTION INTRAVENOUS
Status: DISCONTINUED | OUTPATIENT
Start: 2025-07-14 | End: 2025-07-14

## 2025-07-14 RX ORDER — METHOCARBAMOL 500 MG/1
500 TABLET, FILM COATED ORAL 3 TIMES DAILY PRN
Status: DISCONTINUED | OUTPATIENT
Start: 2025-07-14 | End: 2025-07-16 | Stop reason: HOSPADM

## 2025-07-14 RX ADMIN — PIPERACILLIN AND TAZOBACTAM 4.5 G: 4; .5 INJECTION, POWDER, FOR SOLUTION INTRAVENOUS at 14:35

## 2025-07-14 RX ADMIN — DEXTROSE, SODIUM CHLORIDE, AND POTASSIUM CHLORIDE: 5; .45; .15 INJECTION INTRAVENOUS at 12:22

## 2025-07-14 RX ADMIN — PIPERACILLIN AND TAZOBACTAM 4.5 G: 4; .5 INJECTION, POWDER, FOR SOLUTION INTRAVENOUS at 20:05

## 2025-07-14 RX ADMIN — METHOCARBAMOL 500 MG: 100 INJECTION INTRAMUSCULAR; INTRAVENOUS at 11:40

## 2025-07-14 RX ADMIN — DEXTROSE, SODIUM CHLORIDE, AND POTASSIUM CHLORIDE: 5; .45; .15 INJECTION INTRAVENOUS at 21:00

## 2025-07-14 RX ADMIN — HEPARIN SODIUM 5000 UNITS: 5000 INJECTION, SOLUTION INTRAVENOUS; SUBCUTANEOUS at 18:11

## 2025-07-14 RX ADMIN — Medication 1 SPRAY: at 02:20

## 2025-07-14 RX ADMIN — POTASSIUM CHLORIDE 40 MEQ: 1500 TABLET, EXTENDED RELEASE ORAL at 11:40

## 2025-07-14 RX ADMIN — POTASSIUM CHLORIDE AND SODIUM CHLORIDE: 450; 150 INJECTION, SOLUTION INTRAVENOUS at 02:20

## 2025-07-14 RX ADMIN — PANTOPRAZOLE SODIUM 40 MG: 40 INJECTION, POWDER, FOR SOLUTION INTRAVENOUS at 20:05

## 2025-07-14 RX ADMIN — PIPERACILLIN AND TAZOBACTAM 4.5 G: 4; .5 INJECTION, POWDER, FOR SOLUTION INTRAVENOUS at 09:22

## 2025-07-14 RX ADMIN — HEPARIN SODIUM 5000 UNITS: 5000 INJECTION, SOLUTION INTRAVENOUS; SUBCUTANEOUS at 05:32

## 2025-07-14 RX ADMIN — PIPERACILLIN AND TAZOBACTAM 4.5 G: 4; .5 INJECTION, POWDER, FOR SOLUTION INTRAVENOUS at 02:18

## 2025-07-14 RX ADMIN — PANTOPRAZOLE SODIUM 40 MG: 40 INJECTION, POWDER, FOR SOLUTION INTRAVENOUS at 09:22

## 2025-07-14 RX ADMIN — POTASSIUM CHLORIDE AND SODIUM CHLORIDE: 450; 150 INJECTION, SOLUTION INTRAVENOUS at 09:34

## 2025-07-14 RX ADMIN — POTASSIUM CHLORIDE 40 MEQ: 1500 TABLET, EXTENDED RELEASE ORAL at 18:01

## 2025-07-14 ASSESSMENT — ACTIVITIES OF DAILY LIVING (ADL)
ADLS_ACUITY_SCORE: 59
ADLS_ACUITY_SCORE: 59
ADLS_ACUITY_SCORE: 32
ADLS_ACUITY_SCORE: 59
ADLS_ACUITY_SCORE: 32
ADLS_ACUITY_SCORE: 59
ADLS_ACUITY_SCORE: 32
ADLS_ACUITY_SCORE: 32
ADLS_ACUITY_SCORE: 59
ADLS_ACUITY_SCORE: 32
ADLS_ACUITY_SCORE: 59
ADLS_ACUITY_SCORE: 32
ADLS_ACUITY_SCORE: 32
ADLS_ACUITY_SCORE: 58
ADLS_ACUITY_SCORE: 59
ADLS_ACUITY_SCORE: 58
ADLS_ACUITY_SCORE: 32
ADLS_ACUITY_SCORE: 59
ADLS_ACUITY_SCORE: 59
ADLS_ACUITY_SCORE: 58
ADLS_ACUITY_SCORE: 59
ADLS_ACUITY_SCORE: 32
ADLS_ACUITY_SCORE: 32

## 2025-07-14 NOTE — PLAN OF CARE
SUMMARY: History of UC. Presented with abdominal pain, vomiting, and diarrhea. Found to have a new perforation, underwent exploratory laparotomy with repair of duodenal jejunal perforation and abdominal lavage with drain placement on 7/9. POD 3. Transfer from [facility] to  afternoon of 7/12.     DATE & TIME: 7/13/2025, [id number]              Cognitive Concerns/ Orientation: Alert/Oriented x 4  BEHAVIOR & AGGRESSION TOOL COLOR: Green    ABNL VS/O2: VSS except HR 50s (asymptomatic), room air; CPAP from home but did not use overnight due to irritation of nose while on  MOBILITY: Patient steady on feet. SBA due to IV infusing and NG tube; Calls appropriately; Per MD able to ambulate in [place] with NG tube clamps   PAIN MANAGMENT: Denies  DIET: NPO, except ice chips   BOWEL/BLADDER: Continent. Ambulating to the bathroom. BS active x 4 passing flatus, abdomen soft/non distended; BM on evening shift   ABNL LAB/BG: K 4.1 (was replaced earlier, recheck 07/14 am), hgb 12.0  DRAIN/DEVICES: R lower PIV infusing 1/2 Normal Saline + 20 mEq potassium at 125 ml/hour, IV Zosyn Q6hrs; NG tube LIS in left nare at 60 cm; Brown output  SKIN: pale complexion, surgical incision to center of abdomin with staples-CDI, JANNET drain site to LLQ, dressing clean/dry/intact  TESTS/PROCEDURES: X ray abdomen confirms NG correct placement; Plan for XRay gastrografin upper GI tomorrow 7/14/25 at 0600  D/C DATE: Anticipate discharge in 2-4 days per MD note  Discharge Barriers: pending diet advancement, removal of NGT and plan for JANNET drain  OTHER IMPORTANT INFO:  GI/Surgery following, plan for gastrografin UGI; Electrolyte replacement via IV until UGI results

## 2025-07-14 NOTE — PLAN OF CARE
Care plan note:      Recent Vitals:  Temp: 98.1  F (36.7  C) Temp src: Oral BP: 126/79 Pulse: 64   Resp: 16 SpO2: 98 % O2 Device: None (Room air)      Orientation/Neuro: Alert and Oriented x 4  Pain: The patient is not having any pain.   IV medications: D5 0.5NS 20kcl   Mobility: up ad nora   Skin: Incision: Abdominal.   Resp: RA.  GI: WDL  : WDL     Diet: Tolerating diet:   Well  Orders Placed This Encounter      Clear Liquid Diet      Safety/Concerns:  None  Aggression Color: Green    Plan: Pt states feeling much better after NG tube removal, tolerating clear diet, having soft bowel movements. Discharge in 1-2 days.    Continue to monitor.      Cathy Riddle RN                              Detail Level: Detailed Send Procedure Quote As Charge: No

## 2025-07-14 NOTE — PLAN OF CARE
Goal Outcome Evaluation:                          SUMMARY: History of UC. Presented with abdominal pain, vomiting, and diarrhea. Found to have a new perforation, underwent exploratory laparotomy with repair of duodenal jejunal perforation and abdominal lavage with drain placement on 7/9. POD 3. Transfer from [facility] to  afternoon of 7/12.    DATE & TIME: 7/13/2025, pm shift.    Cognitive Concerns/ Orientation : A & O x 4, calm and cooperative.    BEHAVIOR & AGGRESSION TOOL COLOR: Green   CIWA SCORE: NA    ABNL VS/O2: VSS except HR 50s (asymptomatic) on RA.  MOBILITY: Patient steady on feet. SBA due to iv infusing and NG tube in place. Calls appropriately. Per MD okay to ambulate in halls with NG tube clamped.   PAIN MANAGMENT: Denies. Patient refused schedule Gabapentin.  DIET: NPO, except ice chips   BOWEL/BLADDER: Continent. Ambulating to the bathroom. BS active x 4 passing flatus, abdomen soft/non distended. Had  bm x 1.   ABNL LAB/BG: K 3.2  replaced today in the morning, rechecked 4.1, hgb 12.0.  DRAIN/DEVICES:  PIV infusing IV fluids @ 125 mL with intermittent IV antibiotics. NG tube at 60 cm to left nare at LIS with 250cc brown output emptied.  TELEMETRY RHYTHM: NA  SKIN: pale complexion, surgical incision to center of abdomin with staples-CDI, JANNET drain site to LLQ,  3cc serous output emptied. JANNET site dressing CDI.  TESTS/PROCEDURES: X ray abdomen done today with NG in good position. Plan for XR gastrografin upper GI tomorrow 7/14/25 at 0600.  D/C DATE: Anticipate discharge in 2-4 days per MD note.  Discharge Barriers: pending diet advancement, removal of NGT and plan for JANNET drain.  OTHER IMPORTANT INFO:  GI following. Surgery following, plan for gastrografin UGI on Monday. Patient has patient care order to replace electrolyte iv until after UGI results Monday. Using home CPAP at bedtime. RT came and started patient CPAP at bedtime.

## 2025-07-14 NOTE — PROGRESS NOTES
"Surgery    No complaints except ng is bothersome  Denies pain  +bowel function   Tolerating diet  No nausea  Walking   Denies CP, dyspnea    Gen:  Awake, Alert, NAD  /77 (BP Location: Left arm, Patient Position: Semi-Ga's, Cuff Size: Adult Regular)   Pulse 53   Temp 98.5  F (36.9  C) (Oral)   Resp 18   Ht 1.778 m (5' 10\")   Wt 98.2 kg (216 lb 7.9 oz)   SpO2 95%   BMI 31.06 kg/m    Resp - Non-labored  Abdomen - soft, non tender, non distended. Incisions healing appropriately without erythema or drainage. Staples in place.  Extremities - no lower extremity edema    UGI: no leak.    JANNET output: 43cc/24hr, clear, thin.    Hgb 12.7  WBC 7.4  Electrolytes deranged.    A/P This patient is a 50 year old man with a significant past medical history of perforated duodenal ulcer and ventral hernia repair with mesh who presents with diffuse abdominal pain. He had nonspecific enteritis with loops of mildly dilated small bowel. Later on 7/8, an RRT was called for intractable abdominal pain. Repeat CT was obtained and definitively evidence of perforated viscus. Emergent exploratory laparotomy was performed with abdominal lavage and primary repair of duodenal jejunal perforation on 7/9/25.      - remove NG tube  - begin sips of clear diet.  - electrolyte correction per hospitalist.  - plan to remove drain in 1-2 days.  - encourage incentive spirometer use  - ambulate  - dispo: 1-2 days  - follow up with [facility] as outpatient.    Rosales White PA-C  Office: [phone]  Pager: [phone]    "

## 2025-07-14 NOTE — PROGRESS NOTES
[facility]  Hospitalist Progress Note   07/14/2025          Assessment and Plan:       Simone Johnson is a 50 year old  male with medical history of history of ulcerative colitis (details not available in epic), history of contained duodenal ulcer admitted on 7/8/2025 with abdominal pain, vomiting, diarrhea.    -seen in ED on 7/6 for nausea, vomiting, diarrhea.  In ED WBC 13.5, electrolytes within normal limits, lipase 374.  Patient had received 1 L fluid bolus, IV Zofran with which symptoms improved, discharged home with close follow-up.       Status post emergent exploratory laparotomy with repair of duodenal jejunal perforation and abdominal lavage with drain placement 7/9/2025  Status post proximal jejunum perforation with small volume pneumoperitoneum.  Acute abdominal pain likely from enteritis, early small bowel obstruction on admission.  --Reported diffuse abdominal pain, multiple episodes of loose watery diarrhea, vomiting nonbloody.  Little relief of pain with pantoprazole, dicyclomine, Zofran at home prompting ER evaluation.  -WBC 8.4.  CRP less than 3.  Lipase 259.  UA No concerns for infection. Sodium 136, creatinine 1.01, potassium 3.6.  Liver enzymes within normal limits.   -CT aortic survey 7/8 consistent with a nonspecific proximal enteritis. fluid-filled loops of small bowel throughout the abdomen, borderline distended in the pelvis with gradual narrowing of distal small bowel caliber. Developing obstructive process not excluded.  ---Patient was admitted to the observation unit.  GI, general surgery followed.  Recommended bowel rest, conservative management.  N.p.o., IV fluids, IV pantoprazole, stool studies ordered.   --- On 7/8 night patient had RRT for worsening abdominal pain. Stat CT imaging with New perforation of the severely inflamed proximal jejunum in the left upper quadrant with small volume pneumoperitoneum, regional peritonitis, and increased volume of ascites.  --  Underwent emergent exploratory laparotomy with repair of duodenal jejunal perforation and abdominal lavage with drain placement on 7/9   1.5 L of succus throughout the abdomen.   --7/10 postop abdominal pain.  Yates catheter removed.   --7/11 patient continues to have coffee-ground colored output from NG (high output likely from ice chips, water with sponge] JANNET drain with serosanguineous output.  Passing gas.  Voiding.   -- Will defer postoperative care including pharmacological DVT prophylaxis to surgical team.  Appreciate general surgery comanagement.   - Discussed TPN with surgery who agreed. However, patient wants to hold off given it would require a PICC line. If we cannot get diet restarted on Monday, 7/14, will revisit starting TPN with him as he has not had any nutrition since 7/8/2025.  - Plan for gastrografin UGI tomorrow per surgery  - NGT removed. Plan to remove JANNET drain in 1-2 days per surgery.  - Continue NPO.  - Continue IV fluids.  - Continue IV PPI twice daily.  - Continue IV Zosyn every 6 hours [7/9].  - As needed antiemetics.  - IV as needed pain meds.  - Encourage ambulation.    Starvation ketoacidosis  Noted on labs 7/14. Patient is starting to take in CLD. Will also add D5 to his IV fluids. Recheck ketones this afternoon and tomorrow morning.    Acute hypoxic respiratory failure -multifactorial secondary to pain, narcotic use, underlying CHACHA, resolved  Sinus tachycardia -improved  Left shoulder pain improved   Elevated blood pressures without diagnosis of hypertension - improving   Report of shortness of breath, noted hypoxia.  EKG on admission sinus bradycardia, heart rate 54.  No acute ischemic changes.  Repeat EKG showing sinus tachycardia.  Troponin high-sensitivity 6 < 6.  TSH, magnesium within normal limits.  CT aortic survey from ED-no central pulmonary embolism.  Aggressive incentive spirometry.  As needed IV hydralazine ordered.    Acute anemia likely dilutional.  Presented with  "hemoglobin of 15.7, now dropped to 12.7.   No signs of active bleeding.  Noted coffee-ground emesis from NG, high output likely from ice chips, water via sponge.  Monitor hemoglobin level in a.m. or earlier if symptomatic     Hyperglycemia, prediabetes with a hemoglobin A1c of 5.7.  Monitor blood sugars, if elevated will consider sliding scale insulin.    Hypoalbuminemia likely dilutional, acute illness.  Hypocalcemia likely dilutional.  Monitor.    Hypokalemia.  Potassium replacement protocol ordered  Switch maintenance fluids to fluids with potassium.          Clinically Significant Risk Factors        # Hypokalemia: Lowest K = 3.2 mmol/L in last 2 days, will replace as needed        # Hypoalbuminemia: Lowest albumin = 2.7 g/dL at 7/10/2025  5:25 AM, will monitor as appropriate                # Obesity: Estimated body mass index is 31.06 kg/m  as calculated from the following:    Height as of this encounter: 1.778 m (5' 10\").    Weight as of this encounter: 98.2 kg (216 lb 7.9 oz).           Orders Placed This Encounter      Clear Liquid Diet      DVT Prophylaxis: SCDs, pharmacological DVT prophylaxis per surgical team.  Code Status: Full Code    Medically Ready for Discharge: Anticipated Tomorrow or the next day pending diet advancement, resolution of ketoacidosis, and JANNET drain removal    Medical Decision Making   43 MINUTES SPENT BY ME on the date of service doing chart review, history, exam, documentation & further activities per the note.        Tien Javier MD        Interval History:      Sitting in chair.  Discussed upper GI results with him.  Discussed plan with him and surgery in the room together.  Plan for NG tube removed today and patient to start clear liquid diet.  Discussed with him that he has some starvation ketoacidosis and we will need to add some glucose to his IV fluids and recheck the ketones later today and tomorrow.           Physical Exam:        Physical Exam   Temp:  [98  F (36.7 "  C)-98.7  F (37.1  C)] 98.6  F (37  C)  Pulse:  [50-69] 69  Resp:  [16-18] 18  BP: (121-141)/(74-80) 125/80  SpO2:  [95 %-96 %] 95 %  Constitutional: Awake, alert, cooperative, no apparent distress.    HEENT: NGT in place to LIS, dark output noted in canister.  Pulmonary: No increased work of breathing, good air exchange, clear to auscultation bilaterally, no crackles or wheezing.  Cardiovascular: Regular rate and rhythm, normal S1 and S2, no S3 or S4. No murmurs, rubs, or gallops noted.  GI: Normal bowel sounds, soft, non-distended, non-tender.  No guarding or rebound. JANNET drain in place.  Skin/Integumen: No cyanosis or jaundice. No rashes noted.  Extremities: No lower extremity edema.  Neuro: A&Ox4. Conversant. Responds appropriately in conversation. Moves all extremities with no focal deficit identified.          Medications:        Current Facility-Administered Medications   Medication Dose Route Frequency Provider Last Rate Last Admin    heparin ANTICOAGULANT injection 5,000 Units  5,000 Units Subcutaneous Q12H Ammy Dang MD   5,000 Units at 07/14/25 0532    pantoprazole (PROTONIX) injection 40 mg  40 mg Intravenous BID Ammy Dang MD   40 mg at 07/14/25 0922    piperacillin-tazobactam (ZOSYN) 4.5 g vial to attach to  mL bag  4.5 g Intravenous Q6H Rosales White PA-C 200 mL/hr at 07/13/25 1939 4.5 g at 07/14/25 0922    sodium chloride (PF) 0.9% PF flush 3 mL  3 mL Intracatheter Q8H OTIS Rosales White PA-C   3 mL at 07/14/25 0219     Current Facility-Administered Medications   Medication Dose Route Frequency Provider Last Rate Last Admin    acetaminophen (TYLENOL) Suppository 650 mg  650 mg Rectal Q4H PRN Rosales White PA-C        artificial saliva (BIOTENE MT) solution 1 spray  1 spray Swish & Spit 4x Daily PRN Stevo Langley MD   1 spray at 07/14/25 0220    carboxymethylcellulose PF (REFRESH PLUS) 0.5 % ophthalmic solution 1 drop  1 drop Both Eyes Q1H PRN Tien Javier MD   1  drop at 07/13/25 1417    diazepam (VALIUM) half-tab 2.5-5 mg  2.5-5 mg Oral Q6H PRN Stevo Langley MD        hydrALAZINE (APRESOLINE) injection 10 mg  10 mg Intravenous Q4H PRN Rosales White PA-C        HYDROmorphone (DILAUDID) injection 0.2 mg  0.2 mg Intravenous Q2H PRN Radha Serna PA-C        lidocaine (LMX4) cream   Topical Q1H PRN Rosales White PA-C        lidocaine 1 % 0.1-1 mL  0.1-1 mL Other Q1H PRN Rosales White PA-C        methocarbamol (ROBAXIN) tablet 500 mg  500 mg Oral TID PRN Rosales White PA-C        naloxone (NARCAN) injection 0.2 mg  0.2 mg Intravenous Q2 Min PRN Rosales White PA-C        Or    naloxone (NARCAN) injection 0.4 mg  0.4 mg Intravenous Q2 Min PRN Rosales White PA-C        Or    naloxone (NARCAN) injection 0.2 mg  0.2 mg Intramuscular Q2 Min PRN Rosales White PA-C        Or    naloxone (NARCAN) injection 0.4 mg  0.4 mg Intramuscular Q2 Min PRN Rosales White PA-C        ondansetron (ZOFRAN) injection 4 mg  4 mg Intravenous Q6H PRN Rosales White PA-C        Or    ondansetron (ZOFRAN ODT) ODT tab 4 mg  4 mg Oral Q6H PRN Rosales White PA-C        prochlorperazine (COMPAZINE) injection 10 mg  10 mg Intravenous Q6H PRN Rosales White PA-C        sodium chloride (OCEAN) 0.65 % nasal spray 1 spray  1 spray Both Nostrils Q1H PRN Stevo Langley MD   1 spray at 07/13/25 1032    sodium chloride (PF) 0.9% PF flush 3 mL  3 mL Intracatheter q1 min prn Rosales White PA-C   3 mL at 07/10/25 2031            Data:      All new lab and imaging data was reviewed.

## 2025-07-15 ENCOUNTER — TELEPHONE (OUTPATIENT)
Dept: SURGERY | Facility: CLINIC | Age: 50
End: 2025-07-15
Payer: COMMERCIAL

## 2025-07-15 LAB
ANION GAP SERPL CALCULATED.3IONS-SCNC: 10 MMOL/L (ref 7–15)
B-OH-BUTYR SERPL-SCNC: 0.28 MMOL/L
BUN SERPL-MCNC: 4.4 MG/DL (ref 6–20)
CALCIUM SERPL-MCNC: 8.7 MG/DL (ref 8.8–10.4)
CHLORIDE SERPL-SCNC: 101 MMOL/L (ref 98–107)
CREAT SERPL-MCNC: 0.89 MG/DL (ref 0.67–1.17)
EGFRCR SERPLBLD CKD-EPI 2021: >90 ML/MIN/1.73M2
ERYTHROCYTE [DISTWIDTH] IN BLOOD BY AUTOMATED COUNT: 15.4 % (ref 10–15)
GLUCOSE SERPL-MCNC: 129 MG/DL (ref 70–99)
HCO3 SERPL-SCNC: 26 MMOL/L (ref 22–29)
HCT VFR BLD AUTO: 39.5 % (ref 40–53)
HGB BLD-MCNC: 13.2 G/DL (ref 13.3–17.7)
MCH RBC QN AUTO: 29.9 PG (ref 26.5–33)
MCHC RBC AUTO-ENTMCNC: 33.4 G/DL (ref 31.5–36.5)
MCV RBC AUTO: 90 FL (ref 78–100)
MCV RBC AUTO: 90 FL (ref 78–100)
PLATELET # BLD AUTO: 346 10E3/UL (ref 150–450)
PLATELET # BLD AUTO: 353 10E3/UL (ref 150–450)
POTASSIUM SERPL-SCNC: 3.2 MMOL/L (ref 3.4–5.3)
POTASSIUM SERPL-SCNC: 3.8 MMOL/L (ref 3.4–5.3)
RBC # BLD AUTO: 4.41 10E6/UL (ref 4.4–5.9)
SODIUM SERPL-SCNC: 137 MMOL/L (ref 135–145)
WBC # BLD AUTO: 5.5 10E3/UL (ref 4–11)

## 2025-07-15 PROCEDURE — 250N000011 HC RX IP 250 OP 636: Performed by: PHYSICIAN ASSISTANT

## 2025-07-15 PROCEDURE — 84132 ASSAY OF SERUM POTASSIUM: CPT | Performed by: HOSPITALIST

## 2025-07-15 PROCEDURE — 82010 KETONE BODYS QUAN: CPT | Performed by: STUDENT IN AN ORGANIZED HEALTH CARE EDUCATION/TRAINING PROGRAM

## 2025-07-15 PROCEDURE — 250N000013 HC RX MED GY IP 250 OP 250 PS 637: Performed by: HOSPITALIST

## 2025-07-15 PROCEDURE — 120N000001 HC R&B MED SURG/OB

## 2025-07-15 PROCEDURE — 85027 COMPLETE CBC AUTOMATED: CPT | Performed by: STUDENT IN AN ORGANIZED HEALTH CARE EDUCATION/TRAINING PROGRAM

## 2025-07-15 PROCEDURE — 36415 COLL VENOUS BLD VENIPUNCTURE: CPT | Performed by: HOSPITALIST

## 2025-07-15 PROCEDURE — 250N000013 HC RX MED GY IP 250 OP 250 PS 637: Performed by: SURGERY

## 2025-07-15 PROCEDURE — 258N000003 HC RX IP 258 OP 636: Performed by: HOSPITALIST

## 2025-07-15 PROCEDURE — 250N000011 HC RX IP 250 OP 636: Performed by: SURGERY

## 2025-07-15 PROCEDURE — 85049 AUTOMATED PLATELET COUNT: CPT | Performed by: HOSPITALIST

## 2025-07-15 PROCEDURE — 99232 SBSQ HOSP IP/OBS MODERATE 35: CPT | Performed by: STUDENT IN AN ORGANIZED HEALTH CARE EDUCATION/TRAINING PROGRAM

## 2025-07-15 PROCEDURE — 80048 BASIC METABOLIC PNL TOTAL CA: CPT | Performed by: STUDENT IN AN ORGANIZED HEALTH CARE EDUCATION/TRAINING PROGRAM

## 2025-07-15 RX ORDER — ACETAMINOPHEN 325 MG/10.15ML
650 LIQUID ORAL EVERY 4 HOURS PRN
Status: DISCONTINUED | OUTPATIENT
Start: 2025-07-15 | End: 2025-07-16 | Stop reason: HOSPADM

## 2025-07-15 RX ORDER — PANTOPRAZOLE SODIUM 40 MG/1
40 TABLET, DELAYED RELEASE ORAL
Status: DISCONTINUED | OUTPATIENT
Start: 2025-07-15 | End: 2025-07-16 | Stop reason: HOSPADM

## 2025-07-15 RX ORDER — HYDROCODONE BITARTRATE AND ACETAMINOPHEN 5; 325 MG/1; MG/1
1 TABLET ORAL EVERY 6 HOURS PRN
Refills: 0 | Status: DISCONTINUED | OUTPATIENT
Start: 2025-07-15 | End: 2025-07-16 | Stop reason: HOSPADM

## 2025-07-15 RX ORDER — POTASSIUM CHLORIDE 1500 MG/1
40 TABLET, EXTENDED RELEASE ORAL ONCE
Status: COMPLETED | OUTPATIENT
Start: 2025-07-15 | End: 2025-07-15

## 2025-07-15 RX ADMIN — HEPARIN SODIUM 5000 UNITS: 5000 INJECTION, SOLUTION INTRAVENOUS; SUBCUTANEOUS at 06:18

## 2025-07-15 RX ADMIN — POTASSIUM CHLORIDE 40 MEQ: 1500 TABLET, EXTENDED RELEASE ORAL at 08:29

## 2025-07-15 RX ADMIN — PIPERACILLIN AND TAZOBACTAM 4.5 G: 4; .5 INJECTION, POWDER, FOR SOLUTION INTRAVENOUS at 02:14

## 2025-07-15 RX ADMIN — HEPARIN SODIUM 5000 UNITS: 5000 INJECTION, SOLUTION INTRAVENOUS; SUBCUTANEOUS at 19:03

## 2025-07-15 RX ADMIN — PANTOPRAZOLE SODIUM 40 MG: 40 TABLET, DELAYED RELEASE ORAL at 17:21

## 2025-07-15 RX ADMIN — PANTOPRAZOLE SODIUM 40 MG: 40 TABLET, DELAYED RELEASE ORAL at 08:30

## 2025-07-15 ASSESSMENT — ACTIVITIES OF DAILY LIVING (ADL)
ADLS_ACUITY_SCORE: 32

## 2025-07-15 NOTE — TELEPHONE ENCOUNTER
Referral sent to Dr. Tang Fletcher at [facility] for consultation re: Possible gastrinoma.    [facility] [place] to review records and contact patient directly for a consultation.

## 2025-07-15 NOTE — PROGRESS NOTES
"Surgery Note    Britton reports he is doing well. Feels full quickly. No nausea. Passing gas and having bowel movements.     O: /72 (BP Location: Left arm)   Pulse 65   Temp 98.3  F (36.8  C) (Oral)   Resp 17   Ht 1.778 m (5' 10\")   Wt 98.2 kg (216 lb 7.9 oz)   SpO2 99%   BMI 31.06 kg/m    Abd: incision with staples is c/d/I. JANNET drain removed.     A/P: POD 8 s/p ex lap with repair of duodenojejunal ulcer. UGI yesterday looked good. JANNET drain removed.   - advance to full liquids, will keep on liquid/soft diet for two weeks.   - nutrition consult  - transition to oral medications  - stop zosyn  - oral PPI BID  - will need follow up for possible gastrinoma - he would like to be seen at [facility].     Ammy Dang MD  [facility]  [phone]    "

## 2025-07-15 NOTE — PLAN OF CARE
Goal Outcome Evaluation:    Shift:  07/14/2025 0401-2522   Summary: enteritis  Orientation: A&O x4   Activity Level: IND  Fall Risk: no  Behavior & Aggression Tool Color: green  Pain Management: denies pain this shift   ABNL VS/O2: vss on ra   Tele: na  ABNL Lab/BG:   Diet: clear liquids - tolerating well   Bowel/Bladder: continent of B/B  Drains/Devices: PIV infusing D5 0.5% NS 20 mEq K at 125 mL/hr.   Skin: midline abdominal incision, left side JANNET drain   Tests/Procedures for next shift: possible removal of JANNET drain tomorrow   Anticipated DC date: TBD  Other Important Info:  PT ambulating in [place] throughout shift

## 2025-07-15 NOTE — PLAN OF CARE
SUMMARY: History of UC. Presented with abdominal pain, vomiting, and diarrhea. Found to have a new perforation, underwent exploratory laparotomy with repair of duodenal jejunal perforation and abdominal lavage with drain placement on 7/9. POD 3. Transfer from [facility] to  afternoon of 7/12.     DATE & TIME: 7/14/2025, [id number]              Cognitive Concerns/ Orientation: Alert/Oriented x 4  BEHAVIOR & AGGRESSION TOOL COLOR: Green    ABNL VS/O2: Heart rate 51 otherwise VSS, CPAP overnight  MOBILITY: Independent  PAIN MANAGMENT: Denies  DIET: Clear liquids  BOWEL/BLADDER: Continent   ABNL LAB/BG: K 3.6; ketone 0.52; hgb 12.7; hematocrit 37.7  DRAIN/DEVICES: R lower PIV infusing 1/2 Normal Saline + 20 mEq potassium at 125 ml/hour, IV Zosyn Q6hrs; NG tube LIS in left nare at 60 cm; Brown output  SKIN: pale complexion, surgical incision to center of abdomin with staples-CDI, JANNET drain site to LLQ, dressing clean/dry/intact  TESTS/PROCEDURES: Upper GI Xray complete 07/14  D/C DATE: Possibly 07/15 or 7/16 pending diet advancement/JANNET drain removal  Discharge   OTHER IMPORTANT INFO:  GI/Surgery following

## 2025-07-15 NOTE — PROGRESS NOTES
[facility]  Hospitalist Progress Note   07/15/2025          Assessment and Plan:       Simone Johnson is a 50 year old  male with medical history of history of ulcerative colitis (details not available in epic), history of contained duodenal ulcer admitted on 7/8/2025 with abdominal pain, vomiting, diarrhea.    -seen in ED on 7/6 for nausea, vomiting, diarrhea.  In ED WBC 13.5, electrolytes within normal limits, lipase 374.  Patient had received 1 L fluid bolus, IV Zofran with which symptoms improved, discharged home with close follow-up.       Status post emergent exploratory laparotomy with repair of duodenal jejunal perforation and abdominal lavage with drain placement 7/9/2025  Status post proximal jejunum perforation with small volume pneumoperitoneum.  Acute abdominal pain likely from enteritis, early small bowel obstruction on admission.  --Reported diffuse abdominal pain, multiple episodes of loose watery diarrhea, vomiting nonbloody.  Little relief of pain with pantoprazole, dicyclomine, Zofran at home prompting ER evaluation.  -WBC 8.4.  CRP less than 3.  Lipase 259.  UA No concerns for infection. Sodium 136, creatinine 1.01, potassium 3.6.  Liver enzymes within normal limits.   -CT aortic survey 7/8 consistent with a nonspecific proximal enteritis. fluid-filled loops of small bowel throughout the abdomen, borderline distended in the pelvis with gradual narrowing of distal small bowel caliber. Developing obstructive process not excluded.  ---Patient was admitted to the observation unit.  GI, general surgery followed.  Recommended bowel rest, conservative management.  N.p.o., IV fluids, IV pantoprazole, stool studies ordered.   --- On 7/8 night patient had RRT for worsening abdominal pain. Stat CT imaging with New perforation of the severely inflamed proximal jejunum in the left upper quadrant with small volume pneumoperitoneum, regional peritonitis, and increased volume of ascites.  --  Underwent emergent exploratory laparotomy with repair of duodenal jejunal perforation and abdominal lavage with drain placement on 7/9.  -- Will defer postoperative care including pharmacological DVT prophylaxis to surgical team.  Appreciate general surgery comanagement.   - JANNET drain removed  - Diet advanced to full liquids, likely discharge tomorrow if tolerating  - PO PPI BID  - Abx discontinued per surgery  - As needed antiemetics.  - Encourage ambulation.  - Needs follow up with surgery outpatient for possible gastrinoma, he would like to be seen at [place] for this    Starvation ketoacidosis, resolved  Noted on labs 7/14. Patient is starting to take in CLD. Will also add D5 to his IV fluids. Recheck ketones this afternoon and tomorrow morning. Resolved with fluids and patient initiating diet.    Acute hypoxic respiratory failure -multifactorial secondary to pain, narcotic use, underlying CHACHA, resolved  Sinus tachycardia -resolved  Left shoulder pain improved   Elevated blood pressures without diagnosis of hypertension - improving   Report of shortness of breath, noted hypoxia.  EKG on admission sinus bradycardia, heart rate 54.  No acute ischemic changes.  Repeat EKG showing sinus tachycardia.  Troponin high-sensitivity 6 < 6.  TSH, magnesium within normal limits.  CT aortic survey from ED-no central pulmonary embolism.  Aggressive incentive spirometry.    Acute anemia likely dilutional.  Presented with hemoglobin of 15.7, now in 12-13 range.  No signs of active bleeding.  Noted coffee-ground emesis from NG, high output likely from ice chips, water via sponge.  Monitor hemoglobin level in a.m. or earlier if symptomatic     Hyperglycemia, prediabetes with a hemoglobin A1c of 5.7.  Monitor blood sugars, if elevated will consider sliding scale insulin.    Hypoalbuminemia likely dilutional, acute illness.  Hypocalcemia likely dilutional.  Monitor.    Hypokalemia.  Potassium replacement protocol ordered  Switch  "maintenance fluids to fluids with potassium.          Clinically Significant Risk Factors        # Hypokalemia: Lowest K = 3.2 mmol/L in last 2 days, will replace as needed        # Hypoalbuminemia: Lowest albumin = 2.7 g/dL at 7/10/2025  5:25 AM, will monitor as appropriate                # Obesity: Estimated body mass index is 31.06 kg/m  as calculated from the following:    Height as of this encounter: 1.778 m (5' 10\").    Weight as of this encounter: 98.2 kg (216 lb 7.9 oz).           Orders Placed This Encounter      Full Liquid Diet      DVT Prophylaxis: SCDs, pharmacological DVT prophylaxis per surgical team.  Code Status: Full Code    Medically Ready for Discharge: Anticipated Tomorrow if tolerating diet    Medical Decision Making   37 MINUTES SPENT BY ME on the date of service doing chart review, history, exam, documentation & further activities per the note.        Tien Javier MD        Interval History:      Sitting up in chair.  Advance to full liquid diet by surgery.  He is tolerating this well.  Likely can go home tomorrow if continued tolerating diet.  He ketoacidosis resolved which I informed him of.  He has no other questions or concerns.  Denies abdominal pain, nausea, vomiting.  Having bowel movements and passing gas.         Physical Exam:        Physical Exam   Temp:  [97.9  F (36.6  C)-98.3  F (36.8  C)] 98.3  F (36.8  C)  Pulse:  [51-65] 65  Resp:  [16-17] 17  BP: (111-126)/(65-79) 121/72  SpO2:  [96 %-99 %] 99 %  Constitutional: Awake, alert, cooperative, no apparent distress.    Pulmonary: No increased work of breathing, good air exchange, clear to auscultation bilaterally, no crackles or wheezing.  Cardiovascular: Regular rate and rhythm, normal S1 and S2, no S3 or S4. No murmurs, rubs, or gallops noted.  GI: Normal bowel sounds, soft, non-distended, non-tender.  No guarding or rebound. JANNET drain removed, no drainage from site. Surgical incision c/d/I.  Skin/Integumen: No cyanosis or " jaundice. No rashes noted.  Extremities: No lower extremity edema.  Neuro: A&Ox4. Conversant. Responds appropriately in conversation. Moves all extremities with no focal deficit identified.          Medications:        Current Facility-Administered Medications   Medication Dose Route Frequency Provider Last Rate Last Admin    heparin ANTICOAGULANT injection 5,000 Units  5,000 Units Subcutaneous Q12H Ammy Dang MD   5,000 Units at 07/15/25 0618    pantoprazole (PROTONIX) EC tablet 40 mg  40 mg Oral BID AC Ammy Dang MD   40 mg at 07/15/25 0830    sodium chloride (PF) 0.9% PF flush 3 mL  3 mL Intracatheter Q8H OTIS Rosales White PA-C   3 mL at 07/14/25 2005     Current Facility-Administered Medications   Medication Dose Route Frequency Provider Last Rate Last Admin    acetaminophen (TYLENOL) oral liquid 650 mg  650 mg Oral Q4H PRN Ammy Dang MD        artificial saliva (BIOTENE MT) solution 1 spray  1 spray Swish & Spit 4x Daily PRN Stevo Langley MD   1 spray at 07/14/25 0220    carboxymethylcellulose PF (REFRESH PLUS) 0.5 % ophthalmic solution 1 drop  1 drop Both Eyes Q1H PRN Tien Javier MD   1 drop at 07/13/25 1417    diazepam (VALIUM) half-tab 2.5-5 mg  2.5-5 mg Oral Q6H PRN Stevo Langley MD        hydrALAZINE (APRESOLINE) injection 10 mg  10 mg Intravenous Q4H PRN Rosales White PA-C        HYDROcodone-acetaminophen (NORCO) 5-325 MG per tablet 1 tablet  1 tablet Oral Q6H PRN Ammy Dang MD        lidocaine (LMX4) cream   Topical Q1H PRN Rosales White PA-C        lidocaine 1 % 0.1-1 mL  0.1-1 mL Other Q1H PRN Rosales White PA-C        methocarbamol (ROBAXIN) tablet 500 mg  500 mg Oral TID PRN Rosales White PA-C        naloxone (NARCAN) injection 0.2 mg  0.2 mg Intravenous Q2 Min PRN Rosales White PA-C        Or    naloxone (NARCAN) injection 0.4 mg  0.4 mg Intravenous Q2 Min PRN Christopher, Rosales V, PA-C        Or    naloxone (NARCAN) injection 0.2 mg  0.2 mg  Intramuscular Q2 Min PRN Rosales White PA-C        Or    naloxone (NARCAN) injection 0.4 mg  0.4 mg Intramuscular Q2 Min PRN Rosales White PA-C        ondansetron (ZOFRAN) injection 4 mg  4 mg Intravenous Q6H PRN Rosales White PA-C        Or    ondansetron (ZOFRAN ODT) ODT tab 4 mg  4 mg Oral Q6H PRN Rosales White PA-C        prochlorperazine (COMPAZINE) injection 10 mg  10 mg Intravenous Q6H PRN Rosales White PA-C        sodium chloride (OCEAN) 0.65 % nasal spray 1 spray  1 spray Both Nostrils Q1H PRN Stevo Langley MD   1 spray at 07/13/25 1032    sodium chloride (PF) 0.9% PF flush 3 mL  3 mL Intracatheter q1 min prn Rosales White PA-C   3 mL at 07/10/25 2031            Data:      All new lab and imaging data was reviewed.

## 2025-07-15 NOTE — CONSULTS
"CLINICAL NUTRITION SERVICES - ASSESSMENT NOTE    RECOMMENDATIONS FOR MDs/PROVIDERS:  Please review ability/time frame to introduce soft foods.     Registered Dietitian Interventions:  Ensure and Yogurt trials --> Ensure TID and Gel+ at lunch  Provided handouts: Full Liquid Nutrition Therapy (NCM), Tips for Adding Protein (NCM), Tips to Increase Calories (Forms on Demand), and Tips to Increase Protein (Forms on Demand); pt was made aware and understood to only follow any Tips to increase kcal/pro in liquid form.  Encouraged po intake, emphasizing the importance of protein for recovery and preservation of lean muscle mass   Continue FLD, per surgery, and encouraging intakes as able.      REASON FOR ASSESSMENT  Provider order - liquid/soft diet x 2 weeks, education on diet options    INFORMATION OBTAINED  Assessed patient in room.    NUTRITION HISTORY  - Typical food/fluid intake is 2 meals per day plus a fruit for breakfast  - Diet history - pt reported to have been eating normally PTA. He reported wt loss and a usually wt of 216 lbs a few weeks ago, which is his current wt.   - Supplements - he's tried them before and likes the Ensure here, wants them TID. He'd also like to try the Gelatein+     CURRENT NUTRITION ORDERS  Diet:Orders Placed This Encounter      Full Liquid Diet  Snacks/Supplements:       CURRENT INTAKE/TOLERANCE  - he tolerated his liquid breakfast and drank the Ensure RD sent before RD could get up to the room.    - we reviewed tips to increase his calorie and protein intakes while on liquids. We also discussed small frequent meals and not skipping breakfast during recovery.     SYSTEM FINDINGS  GI symptoms: He tolerated his CoW for breakfast + had a BM this AM.  Skin/wounds: Reviewed    LABS  Nutrition-relevant labs: Reviewed    MEDICATIONS  Nutrition-relevant medications: Reviewed    ANTHROPOMETRICS  Height: 177.8 cm (5' 10\")  Admission Weight: 97.5 kg (215 lb) (07/08/25 0522)   Most Recent Weight: " 98.2 kg (216 lb 7.9 oz) (07/10/25 0541)  IBW: 73 kg (134%)  BMI: Body mass index is 31.06 kg/m .   Weight History:   Wt Readings from Last 10 Encounters:   07/10/25 98.2 kg (216 lb 7.9 oz)   07/06/25 97.5 kg (215 lb)   12/22/24 102.1 kg (225 lb)   12/12/22 105.7 kg (233 lb)   03/21/22 99.8 kg (220 lb)   11/03/21 104.9 kg (231 lb 4.8 oz)   12/28/20 93.6 kg (206 lb 4.8 oz)   12/16/20 93 kg (205 lb)   02/25/17 90.7 kg (200 lb)     ASSESSED NUTRITION NEEDS  Dosing Weight: 79.3 kg, based on adjusted wt  Estimated Energy Needs: 0981-4956 kcals/day (25 - 30 kcals/kg)  Justification: Maintenance  Estimated Protein Needs: + grams protein/day (1.2 - 1.5 grams of pro/kg)  Justification: Post-op   Estimated Fluid Needs: 1017-7731 mL/day (1 mL/kcal)  Justification: Maintenance    MALNUTRITION  % Intake: Decreased intake does not meet criteria  % Weight Loss: None noted  Subcutaneous Fat Loss: None observed  Muscle Loss: None observed  Fluid Accumulation/Edema: None noted  Malnutrition Diagnosis: Patient does not meet two of the established criteria necessary for diagnosing malnutrition but is at risk for malnutrition  Malnutrition Present on Admission: No    NUTRITION DIAGNOSIS  Predicted inadequate nutrient intake protein energy related to restricted to a full liquid diet and need for oral nutrition supplements to help pt meet needs      INTERVENTIONS  Collaboration by nutrition professional with other providers  Discussed intervention/plan with patient and/or family.  Medical food supplement therapy  Nutrition education application  Nutrition education content    GOALS  Patient to consume % of nutritionally adequate meal trays TID, or the equivalent with supplements/snacks.     MONITORING/EVALUATION  Progress toward goals will be monitored and evaluated per policy.

## 2025-07-15 NOTE — PLAN OF CARE
Goal Outcome Evaluation:                          SUMMARY: History of UC. Presented with abdominal pain, vomiting, and diarrhea. Found to have a new perforation, underwent exploratory laparotomy with repair of duodenal jejunal perforation and abdominal lavage with drain placement on 7/9. POD 3. Transfer from [facility] [place] [place] to  afternoon of 7/12.JANNET removed 7/15/2025      DATE & TIME: 7/14/2025, [id number]           Cognitive Concerns/ Orientation: Alert/Oriented x 4  BEHAVIOR & AGGRESSION TOOL COLOR: Green    ABNL VS/O2: Heart rate 51 otherwise VSS,  MOBILITY: Independent  PAIN MANAGMENT: Denies  DIET: Clear liquids advance to full liquid   BOWEL/BLADDER: Continent   ABNL LAB/BG: K 3.2; ketone 0.52; hgb 12.7; hematocrit 37.7  DRAIN/DEVICES: R lower PIV SL   SKIN: surgical incision to center of abdomin with staples-CDI, JANNET drain site to LLQ, dressing clean/dry/intact  TESTS/PROCEDURES: none   D/C DATE: Possibly 7/16 pending diet advancement  Discharge   OTHER IMPORTANT INFO:  GI/Surgery following

## 2025-07-16 VITALS
SYSTOLIC BLOOD PRESSURE: 115 MMHG | BODY MASS INDEX: 30.99 KG/M2 | TEMPERATURE: 98.5 F | HEART RATE: 91 BPM | OXYGEN SATURATION: 99 % | WEIGHT: 216.49 LBS | HEIGHT: 70 IN | RESPIRATION RATE: 16 BRPM | DIASTOLIC BLOOD PRESSURE: 75 MMHG

## 2025-07-16 LAB
MAGNESIUM SERPL-MCNC: 1.6 MG/DL (ref 1.7–2.3)
PHOSPHATE SERPL-MCNC: 2.9 MG/DL (ref 2.5–4.5)
POTASSIUM SERPL-SCNC: 3.8 MMOL/L (ref 3.4–5.3)

## 2025-07-16 PROCEDURE — 83735 ASSAY OF MAGNESIUM: CPT | Performed by: STUDENT IN AN ORGANIZED HEALTH CARE EDUCATION/TRAINING PROGRAM

## 2025-07-16 PROCEDURE — 250N000013 HC RX MED GY IP 250 OP 250 PS 637: Performed by: STUDENT IN AN ORGANIZED HEALTH CARE EDUCATION/TRAINING PROGRAM

## 2025-07-16 PROCEDURE — 84100 ASSAY OF PHOSPHORUS: CPT | Performed by: STUDENT IN AN ORGANIZED HEALTH CARE EDUCATION/TRAINING PROGRAM

## 2025-07-16 PROCEDURE — 99239 HOSP IP/OBS DSCHRG MGMT >30: CPT | Performed by: STUDENT IN AN ORGANIZED HEALTH CARE EDUCATION/TRAINING PROGRAM

## 2025-07-16 PROCEDURE — 36415 COLL VENOUS BLD VENIPUNCTURE: CPT | Performed by: STUDENT IN AN ORGANIZED HEALTH CARE EDUCATION/TRAINING PROGRAM

## 2025-07-16 PROCEDURE — 250N000013 HC RX MED GY IP 250 OP 250 PS 637: Performed by: SURGERY

## 2025-07-16 PROCEDURE — 250N000011 HC RX IP 250 OP 636: Performed by: SURGERY

## 2025-07-16 PROCEDURE — 84132 ASSAY OF SERUM POTASSIUM: CPT | Performed by: STUDENT IN AN ORGANIZED HEALTH CARE EDUCATION/TRAINING PROGRAM

## 2025-07-16 RX ORDER — PANTOPRAZOLE SODIUM 40 MG/1
40 TABLET, DELAYED RELEASE ORAL
Qty: 180 TABLET | Refills: 0 | Status: SHIPPED | OUTPATIENT
Start: 2025-07-16 | End: 2025-10-14

## 2025-07-16 RX ORDER — MAGNESIUM OXIDE 400 MG/1
400 TABLET ORAL AT BEDTIME
Qty: 1 TABLET | Refills: 0 | Status: SHIPPED | OUTPATIENT
Start: 2025-07-16 | End: 2025-07-17

## 2025-07-16 RX ORDER — MAGNESIUM OXIDE 400 MG/1
400 TABLET ORAL ONCE
Status: COMPLETED | OUTPATIENT
Start: 2025-07-16 | End: 2025-07-16

## 2025-07-16 RX ADMIN — PANTOPRAZOLE SODIUM 40 MG: 40 TABLET, DELAYED RELEASE ORAL at 06:48

## 2025-07-16 RX ADMIN — HEPARIN SODIUM 5000 UNITS: 5000 INJECTION, SOLUTION INTRAVENOUS; SUBCUTANEOUS at 06:47

## 2025-07-16 RX ADMIN — Medication 400 MG: at 12:05

## 2025-07-16 ASSESSMENT — ACTIVITIES OF DAILY LIVING (ADL)
ADLS_ACUITY_SCORE: 36
ADLS_ACUITY_SCORE: 32
ADLS_ACUITY_SCORE: 32
ADLS_ACUITY_SCORE: 36
ADLS_ACUITY_SCORE: 32
ADLS_ACUITY_SCORE: 36
ADLS_ACUITY_SCORE: 36
ADLS_ACUITY_SCORE: 32
ADLS_ACUITY_SCORE: 32
ADLS_ACUITY_SCORE: 36
ADLS_ACUITY_SCORE: 32
ADLS_ACUITY_SCORE: 32
ADLS_ACUITY_SCORE: 36

## 2025-07-16 NOTE — PLAN OF CARE
Goal Outcome Evaluation:                          SUMMARY: History of UC. Presented with abdominal pain, vomiting, and diarrhea. Found to have a new perforation, underwent exploratory laparotomy with repair of duodenal jejunal perforation and abdominal lavage with drain placement on 7/9. POD 3. [place] [facility] [place]  afternoon of 7/12.JANNET removed 7/15/2025      DATE & TIME: 7/15/2025, pm shift.           Cognitive Concerns/ Orientation: Alert/Oriented x 4  BEHAVIOR & AGGRESSION TOOL COLOR: Green    ABNL VS/O2: VSS. On RA. Using home CPAP at bedtime.  MOBILITY: Independent. Ambulated outside room.  PAIN MANAGMENT: Denies  DIET:  Full liquid, tolerating. Good appetite.  BOWEL/BLADDER: Continent. Had bm.   ABNL LAB/BG: K 3.2 replaced in the morning, rechecked 3.8. Hgb 13.2.  DRAIN/DEVICES: R lower PIV SL   SKIN: surgical incision to center of abdomin with staples-CDI, JANNET drain site to LLQ, dressing clean/dry/intact.  TESTS/PROCEDURES: none   D/C DATE: Discharge home tomorrow if still tolerating diet.  Discharge   OTHER IMPORTANT INFO:  GI/Surgery following

## 2025-07-16 NOTE — PLAN OF CARE
Goal Outcome Evaluation:         SUMMARY: History of UC. Presented with abdominal pain, vomiting, and diarrhea. Found to have a new perforation, underwent exploratory laparotomy with repair of duodenal jejunal perforation and abdominal lavage with drain placement on 7/9. POD 3. Transfer from [facility] [place] to  afternoon of 7/12.JANNET removed 7/15/2025      DATE & TIME: 7/15/2025-7/16/25 23:00-07:30            Cognitive Concerns/ Orientation: Alert/Oriented x 4  BEHAVIOR & AGGRESSION TOOL COLOR: Green    ABNL VS/O2: VSS. On RA. Using home CPAP at bedtime.  MOBILITY: Independent  PAIN MANAGMENT: Denies  DIET:  Full liquid  BOWEL/BLADDER: Continent. 2x loose to watery BM as per patient  ABNL LAB/BG: K 3.8 Hgb 13.2.  DRAIN/DEVICES: R PIV SL   SKIN: surgical incision to center of abdomen with staples-IJEOMA no drainage no bleeding, JANNET drain site to LLQ, dressing CDI  TESTS/PROCEDURES: none   D/C DATE: Possible discharge home today if tolerating diet.  OTHER IMPORTANT INFO:  GI/Surgery following. Slept well with no concern raised    Slept well this shift. No nausea or vomiting reported, no concern raised.

## 2025-07-16 NOTE — PROGRESS NOTES
"Surgery Note    Britton is doing well. Tolerating diet. Is having liquid stools.     O: /75 (BP Location: Right arm)   Pulse 91   Temp 98.5  F (36.9  C) (Oral)   Resp 16   Ht 1.778 m (5' 10\")   Wt 98.2 kg (216 lb 7.9 oz)   SpO2 99%   BMI 31.06 kg/m    Abd: incision c/d/I with staples in place. No erythema    A/P: 50yom s/p ex lap with repair of perforated duodenojejunal ulcer. Doing well.   - discharge today  - staples out next week  - follow up with me in 2-3 weeks  - working on [facility] referral for possible gastrinoma  - full liquid diet x 2 weeks (ok for soft foods)  - PPI BID until follow up at [facility]    Ammy Dang MD  [facility]  [phone]    "

## 2025-07-16 NOTE — PLAN OF CARE
Goal Outcome Evaluation:      Plan of Care Reviewed With: patient    Overall Patient Progress: improvingOverall Patient Progress: improving     Reason for Admission: s/p emergent laparotomy w/ repair of duodenal jejunal perf    Cognitive/Mentation: A/Ox 4  Neuros/CMS: Intact   VS: stable.  /GI: Continent. Last BM 7/16/25.   Pulmonary: LS clear.  Pain: denies.     Drains/Lines: 2 PIV patent intact SL  Skin: mid abdominal incision IJEOMA staples intact. Drain insertion site covered with gauze no drainage  Activity: IND.  Diet: full liquid with thin liquids. Takes pills whole.     Therapies recs: home   Discharge: today    Aggression Stoplight Tool: green    End of shift summary: surgery saw pt on shift

## 2025-07-16 NOTE — PROGRESS NOTES
Pt discharged home with belongings medications AVS w/ follow up instructions and incisional care instructions. Clarified with discharge pharmacy that protonix refill is too soon pt is able to pick it up 8/31 and can have his pharmacy call [facility] and pull the script to them. Pt is aware of this and is also aware that refill is going to be the newly prescribed 180 pills that reflect the the new order to take it twice a day. Pt was also provided paper copy of 's note for work as well as filled out form for [facility] to obtain his records form was sent to medical records upon its completion.

## 2025-07-16 NOTE — DISCHARGE INSTRUCTIONS
[facility]  Discharge Instructions: Post-Operative Bowel Surgery    ACTIVITY  Expect to feel tired after your surgery.  This will gradually resolve.    Take frequent, short walks and increase your activity gradually.    Avoid strenuous physical activity or heavy lifting greater than 15 lbs. for 3 weeks.  You may climb stairs.    You may drive without restrictions when you are not using any prescription pain medication and feel comfortable in a car.  You may return to work/school when you are comfortable without any prescription pain medication.  You may wear an abdominal binder for comfort for 2-3 weeks from your surgery.  You can wash the abdominal binder and dry it on low heat in the dryer.    WOUND CARE  You may shower, but do not soak your incision(s) in a tub or pool for 2 weeks.  Pat your incisions dry after your shower and leave them open to air.  Re-apply dressing (Band-Aids or gauze/tape) as needed for comfort or drainage.  You have staples at your incisions, they will be removed at your next office visit.  Do not apply any lotions, creams, or ointments to your incisions.  A ridge under your incisions is normal and will gradually resolve.    DIET  Stay on a full liquid/soft diet until your follow up appointment.     You may find your appetite to be diminished briefly after surgery.  You may take nutritional supplement shakes if you are able.   Drink plenty of fluids to stay hydrated.    PAIN  Expect some tenderness and discomfort at the incision site(s).  Use the prescribed pain medication at your discretion.  Expect gradual resolution of your pain over several days.  Do not drink alcohol or drive while you are taking narcotic pain medications.  You may apply ice to your incisions in 10 minute intervals as needed.  You may also use heat if you prefer.    EXPECTATIONS  Pain medications can cause constipation.  Limit use when possible.  Take over the counter stool  softener/stimulant, such as Colace or Senna, 1-2 times a day with plenty of water.  You may take a mild over the counter laxative, such as Miralax or a suppository, as needed.  You may discontinue these medications once you are having regular bowel movements and/or are no longer taking your narcotic pain medication.    RETURN APPOINTMENT  Follow up with a PA at [facility] next week for staple removal.   Follow up with Dr. Dang at [facility] the week of 7/28/25 for post op check.   Please call our office at [phone] to schedule your appointments.  We are located at [address] [place] [address].    CALL OUR OFFICE [phone] IF YOU HAVE:   Chills or fever above 101  F.  Increased redness, warmth, or drainage at your incisions.  Significant bleeding.  Pain not relieved by your pain medication or rest.  Increasing pain after the first 48 hours.  Any other concerns or questions.

## 2025-07-22 NOTE — DISCHARGE SUMMARY
"[facility]  Hospitalist Discharge Summary      Date of Admission:  7/8/2025  Date of Discharge:  7/16/2025  1:10 PM  Discharging Provider: Tien Javier MD  Discharge Service: Hospitalist Service    Discharge Diagnoses   Status post emergent exploratory laparotomy with repair of duodenal jejunal perforation and abdominal lavage with drain placement 7/9/2025  Status post proximal jejunum perforation with small volume pneumoperitoneum.  Acute abdominal pain likely from enteritis, early small bowel obstruction on admission.  Starvation ketoacidosis, resolved  Acute hypoxic respiratory failure -multifactorial secondary to pain, narcotic use, underlying CHACHA, resolved  Sinus tachycardia -resolved  Left shoulder pain improved   Elevated blood pressures without diagnosis of hypertension - improving   Acute anemia likely dilutional.  Hyperglycemia, prediabetes with a hemoglobin A1c of 5.7.  Hypoalbuminemia likely dilutional, acute illness.  Hypocalcemia likely dilutional.  Hypokalemia.        Clinically Significant Risk Factors     # Obesity: Estimated body mass index is 31.06 kg/m  as calculated from the following:    Height as of this encounter: 1.778 m (5' 10\").    Weight as of this encounter: 98.2 kg (216 lb 7.9 oz).       Follow-ups Needed After Discharge   Follow-up Appointments       Follow Up      Follow up with a PA at [facility] next week for staple removal. Follow up with Dr. Dang at [facility] the week of 7/28/25 for post op check. Call [phone] to schedule these appointments.              Unresulted Labs Ordered in the Past 30 Days of this Admission       No orders found from 6/8/2025 to 7/9/2025.          Discharge Disposition   Discharged to home  Condition at discharge: Stable    Hospital Course   Simone Johnson is a 50 year old  male with medical history of history of ulcerative colitis (details not available in epic), history of contained duodenal " ulcer admitted on 7/8/2025 with abdominal pain, vomiting, diarrhea.     -seen in ED on 7/6 for nausea, vomiting, diarrhea.  In ED WBC 13.5, electrolytes within normal limits, lipase 374.  Patient had received 1 L fluid bolus, IV Zofran with which symptoms improved, discharged home with close follow-up.       Status post emergent exploratory laparotomy with repair of duodenal jejunal perforation and abdominal lavage with drain placement 7/9/2025  Status post proximal jejunum perforation with small volume pneumoperitoneum.  Acute abdominal pain likely from enteritis, early small bowel obstruction on admission.  --Reported diffuse abdominal pain, multiple episodes of loose watery diarrhea, vomiting nonbloody.  Little relief of pain with pantoprazole, dicyclomine, Zofran at home prompting ER evaluation.  -WBC 8.4.  CRP less than 3.  Lipase 259.  UA No concerns for infection. Sodium 136, creatinine 1.01, potassium 3.6.  Liver enzymes within normal limits.   -CT aortic survey 7/8 consistent with a nonspecific proximal enteritis. fluid-filled loops of small bowel throughout the abdomen, borderline distended in the pelvis with gradual narrowing of distal small bowel caliber. Developing obstructive process not excluded.  ---Patient was admitted to the observation unit.  GI, general surgery followed.  Recommended bowel rest, conservative management.  N.p.o., IV fluids, IV pantoprazole, stool studies ordered.   --- On 7/8 night patient had RRT for worsening abdominal pain. Stat CT imaging with New perforation of the severely inflamed proximal jejunum in the left upper quadrant with small volume pneumoperitoneum, regional peritonitis, and increased volume of ascites.  -- Underwent emergent exploratory laparotomy with repair of duodenal jejunal perforation and abdominal lavage with drain placement on 7/9.  -- Will defer postoperative care including pharmacological DVT prophylaxis to surgical team.  Appreciate general surgery  comanagement.  - JANNET drain removed  - Diet advanced to full liquids, likely discharge tomorrow if tolerating  - PO PPI BID  - Abx discontinued per surgery  - Encourage ambulation.  - Needs follow up with surgery outpatient for possible gastrinoma, he would like to be seen at [place] for this     Starvation ketoacidosis, resolved  Noted on labs 7/14. Patient is starting to take in CLD. Will also add D5 to his IV fluids. Recheck ketones this afternoon and tomorrow morning. Resolved with fluids and patient initiating diet.     Acute hypoxic respiratory failure -multifactorial secondary to pain, narcotic use, underlying CHACHA, resolved  Sinus tachycardia -resolved  Left shoulder pain improved   Elevated blood pressures without diagnosis of hypertension - improving   Report of shortness of breath, noted hypoxia.  EKG on admission sinus bradycardia, heart rate 54.  No acute ischemic changes.  Repeat EKG showing sinus tachycardia.  Troponin high-sensitivity 6 < 6.  TSH, magnesium within normal limits.  CT aortic survey from ED-no central pulmonary embolism.     Acute anemia likely dilutional  Presented with hemoglobin of 15.7, now in 12-13 range.  No signs of active bleeding.  Noted coffee-ground emesis from NG, high output likely from ice chips, water via sponge. None after removing NG tube.     Hyperglycemia, prediabetes with a hemoglobin A1c of 5.7.  Monitor blood sugars, if elevated will consider sliding scale insulin.     Hypoalbuminemia likely dilutional, acute illness.  Hypocalcemia likely dilutional.  Monitor.     Hypokalemia, resolved        Consultations This Hospital Stay   GASTROENTEROLOGY IP CONSULT  SURGERY GENERAL IP CONSULT  VASCULAR ACCESS ADULT IP CONSULT  NUTRITION SERVICES ADULT IP CONSULT    Code Status   Prior    Time Spent on this Encounter   I, Tien Javier MD, personally saw the patient today and spent greater than 30 minutes discharging this patient.       Tien Javier MD  [facility]  [place] [address] MEDICAL SPECIALTY UNIT  [address] [place] [address]  Phone: [phone]  ______________________________________________________________________    Physical Exam   Vital Signs:                    Weight: 216 lbs 7.87 oz  Constitutional: Awake, alert, cooperative, no apparent distress.    Pulmonary: No increased work of breathing, good air exchange, clear to auscultation bilaterally, no crackles or wheezing.  Cardiovascular: Regular rate and rhythm, normal S1 and S2, no S3 or S4. No murmurs, rubs, or gallops noted.  GI: Normal bowel sounds, soft, non-distended, non-tender.  No guarding or rebound. JANNET drain in place.  Skin/Integumen: No cyanosis or jaundice. No rashes noted.  Extremities: No lower extremity edema.  Neuro: A&Ox4. Conversant. Responds appropriately in conversation. Moves all extremities with no focal deficit identified.            Primary Care Physician   Kyle Cadet    Discharge Orders      Reason for your hospital stay    Recovery following emergent abdominal surgery.     Activity    Your activity upon discharge: activity as tolerated but avoid lifting, driving, or strenuous exercise for 3 weeks from surgery.     Follow Up    Follow up with a PA at [facility] next week for staple removal. Follow up with Dr. Dang at [facility] the week of 7/28/25 for post op check. Call [phone] to schedule these appointments.     Diet    Follow this diet upon discharge: Continue full liquid diet/soft diet with small portions for 2 weeks after discharge from hospital.       Significant Results and Procedures   Most Recent 3 CBC's:  Recent Labs   Lab Test 07/15/25  0624 07/14/25  0607 07/13/25  0527   WBC 5.5 7.4 6.9   HGB 13.2* 12.7* 12.0*   MCV 90  90 89 91     346 324 294     Most Recent 3 BMP's:  Recent Labs   Lab Test 07/16/25  0624 07/15/25  1352 07/15/25  0624 07/14/25  1631 07/14/25  0607 07/13/25  1647 07/13/25  0527   NA  --   --  137  --  143   --  143   POTASSIUM 3.8 3.8 3.2*   < > 3.3*   < > 3.3*  3.2*   CHLORIDE  --   --  101  --  100  --  100   CO2  --   --  26  --  25  --  27   BUN  --   --  4.4*  --  10.1  --  11.6   CR  --   --  0.89  --  0.84  --  0.88   ANIONGAP  --   --  10  --  18*  --  16*   NICK  --   --  8.7*  --  8.3*  --  8.8   GLC  --   --  129*  --  76  --  83    < > = values in this interval not displayed.     Most Recent 2 LFT's:  Recent Labs   Lab Test 07/12/25  0742 07/10/25  0525   AST 12 16   ALT 8 10   ALKPHOS 48 53   BILITOTAL 0.3 0.6   ,   Results for orders placed or performed during the hospital encounter of 07/08/25   CT Aortic Survey w Contrast    Narrative    EXAM: CT AORTIC SURVEY W CONTRAST  LOCATION: [facility]  DATE: 7/8/2025    INDICATION: lower abdominal pain, flank pain. also has left shoulder pain. hx of ulcerative colitis. rule out dissection. but also concerned for ureteral stone, colitis diverticulitis  COMPARISON: CT abdomen/pelvis from 3/21/2022.  TECHNIQUE: CT angiogram chest abdomen pelvis during arterial phase of injection of IV contrast. 2D and 3D MIP reconstructions were performed by the CT technologist. Dose reduction techniques were used.   CONTRAST: 72 mL of Isovue-370.    FINDINGS:   CT ANGIOGRAM CHEST, ABDOMEN, AND PELVIS: No hyperdense acute aortic intramural hematoma on the noncontrast series. Normal caliber thoracoabdominal aorta without atherosclerotic calcification. After the administration of IV contrast, there is no evidence   of dissection. Three-vessel aortic arch with patent arch vessels. The abdominal aortic branch vessels are patent. The iliac and proximal femoral arteries are patent. No central pulmonary embolism.    LUNGS AND PLEURA: No acute airspace disease. No interstitial edema. No pneumothorax or pleural effusion.    MEDIASTINUM/AXILLAE: Normal heart size. No pericardial effusion.    CORONARY ARTERY CALCIFICATION: None.    HEPATOBILIARY: Normal.    PANCREAS:  Normal.    SPLEEN: There is some low density fluid adjacent to the spleen, though the spleen is within normal limits for arterial phase appearance.    ADRENAL GLANDS: Normal.    KIDNEYS/BLADDER: Normal.    BOWEL: Fluid-filled stomach. Fluid-filled loops of small bowel throughout the abdomen, with borderline distended loops of small bowel in the pelvis. There is gradual narrowing of bowel caliber distally. Mild to moderate wall thickening of the proximal   jejunum as seen on image 59 of series 5. No free air. Normal appendix.    LYMPH NODES: Normal.    PELVIC ORGANS: Small amount of free fluid. Fat-containing left inguinal hernia.    MUSCULOSKELETAL: Normal.      Impression    IMPRESSION:  1.  Findings consistent with a nonspecific proximal enteritis.    2.  Fluid-filled loops of small bowel throughout the abdomen, borderline distended in the pelvis with gradual narrowing of distal small bowel caliber. Developing obstructive process not excluded.    3.  No aortic aneurysm or dissection.     CT Abdomen Pelvis w Contrast     Value    Radiologist flags Perforated viscus (AA)    Narrative    EXAM: CT ABDOMEN PELVIS W CONTRAST  LOCATION: [facility]  DATE: 7/9/2025    INDICATION: Acute abdomen, concern for perforation  COMPARISON: Abdomen and pelvis CT from 03/31/2022.  TECHNIQUE: CT scan of the abdomen and pelvis was performed following injection of IV contrast. Multiplanar reformats were obtained. Dose reduction techniques were used.  CONTRAST: 112mL Isovue 370    FINDINGS:   LOWER CHEST: Bibasilar atelectasis with small pleural effusions.    HEPATOBILIARY: Normal liver. Mildly distended gallbladder.    PANCREAS: Normal.    SPLEEN: Normal.    ADRENAL GLANDS: Normal.    KIDNEYS/BLADDER: Normal.    BOWEL: Long segment inflamed small bowel involving distal duodenum and proximal jejunum, manifest with bowel wall thickening, mucosal hyperenhancement, perienteric inflammation, and small volume of  simple density nonloculated ascites. Small volume   pneumoperitoneum predominantly in the left upper quadrant abutting the inflamed segments of small bowel (series 4, images 84 and 85). Normal appendix. Normal colon and rectum.    LYMPH NODES: Normal.    VASCULATURE: Normal.    PELVIC ORGANS: Normal.    MUSCULOSKELETAL: Normal.      Impression    IMPRESSION:   1.  New perforation of the severely inflamed proximal jejunum in the left upper quadrant with small volume pneumoperitoneum (series 4, images 84 and 85), regional peritonitis, and increased volume of ascites.      [Critical Result: Perforated viscus]    Finding was identified on 7/9/2025 12:15 AM CDT.     Dr. Mock was contacted by me on 7/9/2025 12:27 AM CDT and verbalized understanding of the critical result.   XR Abdomen 1 View    Narrative    EXAM: XR ABDOMEN 1 VIEW  LOCATION: [facility]  DATE: 7/13/2025    INDICATION: Verify NG location, high NG output  COMPARISON: None.      Impression    IMPRESSION: Orogastric tube with tip over stomach. Drain projects over left upper quadrant. Nonobstructive bowel gas pattern.   XR Upper GI Water Soluble    Narrative    EXAM: XR UPPER GI WATER SOLUBLE  LOCATION: [facility]  DATE: 7/14/2025    INDICATION: Perforated duodenal/jejunal ulcer status post primary repair 07/09/2025  COMPARISON: CT abdomen pelvis 07/08/2025  TECHNIQUE: Routine.    RADIATION DOSE: Total Air Kerma 42.4 mGy    FINDINGS:   Approximately 100 mL of Gastrografin was instilled through the existing nasogastric tube. Contrast flowed normally through the stomach and duodenum into the jejunum without evidence of extraluminal contrast.      Impression    IMPRESSION:  No evidence of leak.       Discharge Medications      Review of your medicines        UNREVIEWED medicines. Ask your doctor about these medicines        Dose / Directions   magnesium oxide 400 MG tablet  Commonly known as: MAG-OX  Used for:  Hypomagnesemia  Ask about: Should I take this medication?      Dose: 400 mg  Take 1 tablet (400 mg) by mouth at bedtime for 1 dose.  Quantity: 1 tablet  Refills: 0            CHANGE how you take these medications        Dose / Directions   pantoprazole 40 MG EC tablet  Commonly known as: PROTONIX  This may have changed:   when to take this  additional instructions  Used for: Perforation of intestine (H)      Dose: 40 mg  Take 1 tablet (40 mg) by mouth 2 times daily (before meals).  Quantity: 180 tablet  Refills: 0            CONTINUE these medicines which have NOT CHANGED        Dose / Directions   dicyclomine 20 MG tablet  Commonly known as: BENTYL      Dose: 1 tablet  Take 1 tablet by mouth 3 times daily as needed.  Refills: 0     ondansetron 4 MG ODT tab  Commonly known as: ZOFRAN ODT      Dose: 4 mg  Take 4 mg by mouth every 8 hours as needed.  Refills: 0               Where to get your medicines        These medications were sent to [facility] [place], [place] - [address] [address]  [address] [address] [address]      Phone: [phone]   magnesium oxide 400 MG tablet  pantoprazole 40 MG EC tablet       Allergies   Allergies   Allergen Reactions    Dilaudid [Hydromorphone] Hallucination    Oxycodone Other (See Comments) and Hallucination     Suicidal ideation    Amoxicillin      Tolerates cephalosporins (ceftriaxone and cefdinir)

## 2025-07-23 ENCOUNTER — OFFICE VISIT (OUTPATIENT)
Dept: SURGERY | Facility: CLINIC | Age: 50
End: 2025-07-23
Payer: COMMERCIAL

## 2025-07-23 DIAGNOSIS — K63.1 PERFORATION OF INTESTINE (H): Primary | ICD-10-CM

## 2025-07-23 PROCEDURE — 99024 POSTOP FOLLOW-UP VISIT: CPT

## 2025-07-23 NOTE — PROGRESS NOTES
[facility] Office Visit Note    Subjective: Simone Johnson is here for his first postoperative visit and staple removal.  He underwent an ex lap with repair of duodenal jejunal perforation and abdominal lavage with drain placement by Dr. Dang on 7/9/25.  Today he tells me he is doing well and denies any complaints.  He is on a full liquid diet as instructed 2 weeks after discharge.  The patient states he is slowly resuming normal activity.  The patient denies fever/chills, n/v/d, abdominal pain, changes in urination or BM, or wound concerns.      Objective:  Abd: soft, non-tender, non-distended.  Wound(s): clean, dry and intact.  No erythema or drainage noted.  Staples in place.    Assessment and Plan:  S/P ex lap with repair of duodenal jejunal perforation  - Staples removed without difficulty. Steri strips applied.  - Wound(s) healing well, continue to keep clean and dry.  - Advance activity as tolerated, no heavy lifting > 20 lbs or strenuous exercise x 4 weeks after surgery.   - Follow up with Dr. Dang next week as scheduled.    Shobha Vences PA-C  Please route or send letter to:  Primary Care Provider (PCP)

## 2025-07-29 ENCOUNTER — OFFICE VISIT (OUTPATIENT)
Dept: SURGERY | Facility: CLINIC | Age: 50
End: 2025-07-29
Payer: COMMERCIAL

## 2025-07-29 DIAGNOSIS — K63.1 PERFORATION OF INTESTINE (H): Primary | ICD-10-CM

## 2025-07-29 DIAGNOSIS — K27.9 PEPTIC ULCER: ICD-10-CM

## 2025-07-29 PROCEDURE — 99024 POSTOP FOLLOW-UP VISIT: CPT | Performed by: SURGERY

## 2025-07-29 RX ORDER — PANTOPRAZOLE SODIUM 40 MG/1
40 TABLET, DELAYED RELEASE ORAL 2 TIMES DAILY
Qty: 180 TABLET | Refills: 0 | Status: SHIPPED | OUTPATIENT
Start: 2025-07-29 | End: 2025-10-27

## 2025-07-29 NOTE — PROGRESS NOTES
Surgery Postoperative Note    S: Britton reports he is doing well after surgery. He is having more normal bowel movements. Occasional diarrhea. He has been on a liquid diet since surgery.     Abdomen: Midline abdominal incision is healing well.  No erythema or induration. No hernias.     A/P  Simone Johnson is recovering from a exploratory laparotomy with repair of perforated duodenal/jejunal ulcer at ligament of Treitz on 7/9/2025.  He is feeling well overall.  We discussed again concern for possible gastrinoma given rare location of perforated ulcer and prior ulcer history as well as longstanding issues with diarrhea.  Plan for follow-up at [place] as previously discussed and is scheduled in October.     Ok to advance diet. Discussed diet recommendations. Lifting restriction through 6 weeks to reduce hernia risk. He will follow up with me as needed going forward. He will send a mychart after his visit at [place] to keep me updated on further workup.     Thank you for the opportunity to help in his care.    Ammy Dang MD  [facility]  [phone]    Please route or send letter to:  Primary Care Provider (PCP) and Referring Provider

## 2025-07-29 NOTE — LETTER
July 29, 2025          [facility]  [address] [place] [address] [place] [address]      RE:   Simone Johnson 1975      Dear Colleague,    Thank you for referring your patient, Simone Johnson, to [facility]. Please see a copy of my visit note below.     Britton reports he is doing well after surgery. He is having more normal bowel movements. Occasional diarrhea. He has been on a liquid diet since surgery.      Abdomen: Midline abdominal incision is healing well.  No erythema or induration. No hernias.      A/P  Simone Johnson is recovering from a exploratory laparotomy with repair of perforated duodenal/jejunal ulcer at ligament of Treitz on 7/9/2025.  He is feeling well overall.  We discussed again concern for possible gastrinoma given rare location of perforated ulcer and prior ulcer history as well as longstanding issues with diarrhea.  Plan for follow-up at [place] as previously discussed and is scheduled in October.      Ok to advance diet. Discussed diet recommendations. Lifting restriction through 6 weeks to reduce hernia risk. He will follow up with me as needed going forward. He will send a mychart after his visit at [place] to keep me updated on further workup.        Again, thank you for allowing me to participate in the care of your patient.      Sincerely,      Ammy Dang MD

## (undated) DEVICE — GOWN IMPERVIOUS BREATHABLE SMART LG 89015

## (undated) DEVICE — Device

## (undated) DEVICE — ESU ELEC BLADE 6" COATED/INSULATED E1455-6

## (undated) DEVICE — PREP CHLORAPREP 26ML TINTED HI-LITE ORANGE 930815

## (undated) DEVICE — SU PDS II 0 CTX 60" Z990G

## (undated) DEVICE — LINEN TOWEL PACK X5 5464

## (undated) DEVICE — SU ETHILON 3-0 FS-1 18" 669H

## (undated) DEVICE — ESU ELEC BLADE 2.75" COATED/INSULATED E1455

## (undated) DEVICE — GLOVE PROTEXIS BLUE W/NEU-THERA 6.5  2D73EB65

## (undated) DEVICE — DRAIN RESERVOIR 100ML JP 0070740

## (undated) DEVICE — SUCTION MANIFOLD NEPTUNE 2 SYS 4 PORT 0702-020-000

## (undated) DEVICE — STPL SKIN 35W 6.9MM  PXW35

## (undated) DEVICE — CATH TRAY FOLEY COUDE SURESTEP 16FR W/URNE MTR STLK A304716A

## (undated) DEVICE — DRSG GAUZE 4X4" 3033

## (undated) DEVICE — SU VICRYL+ 2-0 12X18IN VIO VCP105G

## (undated) DEVICE — SU VICRYL 3-0 SH 27" J316H

## (undated) DEVICE — GLOVE PROTEXIS MICRO 6.0 LT BLUE 2D73PM60

## (undated) DEVICE — DRAIN JACKSON PRATT 19FR ROUND SU130-1325

## (undated) DEVICE — ESU ELEC BLADE 6" COATED E1450-6

## (undated) DEVICE — RX VISTASEAL FIBRIN SEALANT W/THROMBIN 10ML VST10

## (undated) DEVICE — BLADE KNIFE SURG 15 371115

## (undated) DEVICE — KIT TURNOVER FAIRVIEW SOUTHDALE FULL SP3889

## (undated) DEVICE — ESU PENCIL SMOKE EVAC W/ROCKER SWITCH 0703-047-000

## (undated) DEVICE — PACK MAJOR SBA15MAFSI

## (undated) DEVICE — SOL WATER IRRIG 1000ML BOTTLE 2F7114

## (undated) DEVICE — SU VICRYL 3-0 SH CR 8X18" J774

## (undated) DEVICE — SU VICRYL+ 1 CTX 18IN VIO VCP765D

## (undated) DEVICE — ESU GROUND PAD UNIVERSAL W/O CORD

## (undated) RX ORDER — HYDROMORPHONE HYDROCHLORIDE 1 MG/ML
INJECTION, SOLUTION INTRAMUSCULAR; INTRAVENOUS; SUBCUTANEOUS
Status: DISPENSED
Start: 2025-07-09

## (undated) RX ORDER — PROPOFOL 10 MG/ML
INJECTION, EMULSION INTRAVENOUS
Status: DISPENSED
Start: 2025-07-09

## (undated) RX ORDER — FENTANYL CITRATE 50 UG/ML
INJECTION, SOLUTION INTRAMUSCULAR; INTRAVENOUS
Status: DISPENSED
Start: 2025-07-09